# Patient Record
Sex: FEMALE | Race: BLACK OR AFRICAN AMERICAN | Employment: OTHER | URBAN - METROPOLITAN AREA
[De-identification: names, ages, dates, MRNs, and addresses within clinical notes are randomized per-mention and may not be internally consistent; named-entity substitution may affect disease eponyms.]

---

## 2017-01-01 ENCOUNTER — APPOINTMENT (OUTPATIENT)
Dept: GENERAL RADIOLOGY | Age: 77
End: 2017-01-01
Attending: INTERNAL MEDICINE
Payer: MEDICARE

## 2017-01-01 ENCOUNTER — HOSPITAL ENCOUNTER (EMERGENCY)
Age: 77
Discharge: HOME | End: 2017-12-21
Attending: INTERNAL MEDICINE
Payer: MEDICARE

## 2017-01-01 VITALS
TEMPERATURE: 97.6 F | HEIGHT: 65 IN | OXYGEN SATURATION: 100 % | SYSTOLIC BLOOD PRESSURE: 155 MMHG | DIASTOLIC BLOOD PRESSURE: 60 MMHG | BODY MASS INDEX: 29.99 KG/M2 | HEART RATE: 53 BPM | RESPIRATION RATE: 16 BRPM | WEIGHT: 180 LBS

## 2017-01-01 DIAGNOSIS — S80.01XA CONTUSION OF RIGHT KNEE, INITIAL ENCOUNTER: ICD-10-CM

## 2017-01-01 DIAGNOSIS — M15.9 PRIMARY OSTEOARTHRITIS INVOLVING MULTIPLE JOINTS: ICD-10-CM

## 2017-01-01 DIAGNOSIS — M25.561 ACUTE PAIN OF RIGHT KNEE: ICD-10-CM
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
## 2017-01-01 LAB
ALBUMIN SERPL-MCNC: 3.5 G/DL (ref 3.4–5)
ALBUMIN/GLOB SERPL: 0.9 {RATIO} (ref 0.8–1.7)
ALP SERPL-CCNC: 56 U/L (ref 45–117)
ALT SERPL-CCNC: 43 U/L (ref 13–56)
ANION GAP SERPL CALC-SCNC: 11 MMOL/L (ref 3–18)
AST SERPL-CCNC: 33 U/L (ref 15–37)
ATRIAL RATE: 241 BPM
BASOPHILS # BLD: 0 K/UL (ref 0–0.06)
BASOPHILS NFR BLD: 0 % (ref 0–2)
BILIRUB SERPL-MCNC: 0.5 MG/DL (ref 0.2–1)
BUN SERPL-MCNC: 13 MG/DL (ref 7–18)
BUN/CREAT SERPL: 13 (ref 12–20)
CALCIUM SERPL-MCNC: 9.3 MG/DL (ref 8.5–10.1)
CALCULATED R AXIS, ECG10: 18 DEGREES
CALCULATED T AXIS, ECG11: 124 DEGREES
CHLORIDE SERPL-SCNC: 104 MMOL/L (ref 100–108)
CK MB CFR SERPL CALC: 1.5 % (ref 0–4)
CK MB SERPL-MCNC: 1.2 NG/ML (ref 5–25)
CK SERPL-CCNC: 78 U/L (ref 26–192)
CO2 SERPL-SCNC: 27 MMOL/L (ref 21–32)
CREAT SERPL-MCNC: 1.03 MG/DL (ref 0.6–1.3)
DIAGNOSIS, 93000: NORMAL
DIFFERENTIAL METHOD BLD: ABNORMAL
EOSINOPHIL # BLD: 0.1 K/UL (ref 0–0.4)
EOSINOPHIL NFR BLD: 1 % (ref 0–5)
ERYTHROCYTE [DISTWIDTH] IN BLOOD BY AUTOMATED COUNT: 15.3 % (ref 11.6–14.5)
GLOBULIN SER CALC-MCNC: 3.8 G/DL (ref 2–4)
GLUCOSE SERPL-MCNC: 233 MG/DL (ref 74–99)
HCT VFR BLD AUTO: 36.8 % (ref 35–45)
HGB BLD-MCNC: 12.2 G/DL (ref 12–16)
LYMPHOCYTES # BLD: 0.9 K/UL (ref 0.9–3.6)
LYMPHOCYTES NFR BLD: 13 % (ref 21–52)
MAGNESIUM SERPL-MCNC: 1.5 MG/DL (ref 1.6–2.6)
MCH RBC QN AUTO: 30.3 PG (ref 24–34)
MCHC RBC AUTO-ENTMCNC: 33.2 G/DL (ref 31–37)
MCV RBC AUTO: 91.5 FL (ref 74–97)
MONOCYTES # BLD: 0.6 K/UL (ref 0.05–1.2)
MONOCYTES NFR BLD: 9 % (ref 3–10)
NEUTS SEG # BLD: 4.9 K/UL (ref 1.8–8)
NEUTS SEG NFR BLD: 77 % (ref 40–73)
PLATELET # BLD AUTO: 250 K/UL (ref 135–420)
PMV BLD AUTO: 10.5 FL (ref 9.2–11.8)
POTASSIUM SERPL-SCNC: 4.6 MMOL/L (ref 3.5–5.5)
PROT SERPL-MCNC: 7.3 G/DL (ref 6.4–8.2)
Q-T INTERVAL, ECG07: 430 MS
QRS DURATION, ECG06: 80 MS
QTC CALCULATION (BEZET), ECG08: 392 MS
RBC # BLD AUTO: 4.02 M/UL (ref 4.2–5.3)
SODIUM SERPL-SCNC: 142 MMOL/L (ref 136–145)
TROPONIN I SERPL-MCNC: <0.02 NG/ML (ref 0–0.06)
VENTRICULAR RATE, ECG03: 50 BPM
WBC # BLD AUTO: 6.3 K/UL (ref 4.6–13.2)

## 2017-01-01 PROCEDURE — 80053 COMPREHEN METABOLIC PANEL: CPT | Performed by: INTERNAL MEDICINE

## 2017-01-01 PROCEDURE — 93005 ELECTROCARDIOGRAM TRACING: CPT

## 2017-01-01 PROCEDURE — 73552 X-RAY EXAM OF FEMUR 2/>: CPT

## 2017-01-01 PROCEDURE — 82550 ASSAY OF CK (CPK): CPT | Performed by: INTERNAL MEDICINE

## 2017-01-01 PROCEDURE — 85025 COMPLETE CBC W/AUTO DIFF WBC: CPT | Performed by: INTERNAL MEDICINE

## 2017-01-01 PROCEDURE — 74011250636 HC RX REV CODE- 250/636: Performed by: INTERNAL MEDICINE

## 2017-01-01 PROCEDURE — 73560 X-RAY EXAM OF KNEE 1 OR 2: CPT

## 2017-01-01 PROCEDURE — 83735 ASSAY OF MAGNESIUM: CPT | Performed by: INTERNAL MEDICINE

## 2017-01-01 PROCEDURE — 99284 EMERGENCY DEPT VISIT MOD MDM: CPT

## 2017-01-01 PROCEDURE — 96374 THER/PROPH/DIAG INJ IV PUSH: CPT

## 2017-01-01 RX ORDER — KETOROLAC TROMETHAMINE 30 MG/ML
15 INJECTION, SOLUTION INTRAMUSCULAR; INTRAVENOUS
Status: COMPLETED | OUTPATIENT
Start: 2017-01-01 | End: 2017-01-01

## 2017-01-01 RX ORDER — FENTANYL CITRATE 50 UG/ML
50 INJECTION, SOLUTION INTRAMUSCULAR; INTRAVENOUS ONCE
Status: COMPLETED | OUTPATIENT
Start: 2017-01-01 | End: 2017-01-01

## 2017-01-01 RX ORDER — ACETAMINOPHEN AND CODEINE PHOSPHATE 300; 30 MG/1; MG/1
1 TABLET ORAL
Qty: 12 TAB | Refills: 0 | Status: SHIPPED | OUTPATIENT
Start: 2017-01-01 | End: 2018-01-01

## 2017-01-01 RX ADMIN — FENTANYL CITRATE 50 MCG: 50 INJECTION, SOLUTION INTRAMUSCULAR; INTRAVENOUS at 11:29

## 2017-01-01 RX ADMIN — KETOROLAC TROMETHAMINE 15 MG: 30 INJECTION, SOLUTION INTRAMUSCULAR at 14:52

## 2017-01-26 PROBLEM — M16.10 HIP ARTHRITIS: Status: ACTIVE | Noted: 2017-01-26

## 2017-02-28 PROBLEM — R06.02 SHORTNESS OF BREATH: Status: ACTIVE | Noted: 2017-02-28

## 2017-02-28 PROBLEM — L03.116 CELLULITIS OF LEFT LOWER EXTREMITY: Status: ACTIVE | Noted: 2017-02-28

## 2017-02-28 PROBLEM — R60.9 EDEMA: Status: ACTIVE | Noted: 2017-02-28

## 2017-02-28 PROBLEM — J18.9 PNEUMONIA: Status: ACTIVE | Noted: 2017-02-28

## 2017-08-08 ENCOUNTER — HOSPITAL ENCOUNTER (EMERGENCY)
Age: 77
Discharge: HOME | End: 2017-08-08
Attending: FAMILY MEDICINE
Payer: MEDICARE

## 2017-08-08 ENCOUNTER — APPOINTMENT (OUTPATIENT)
Dept: GENERAL RADIOLOGY | Age: 77
End: 2017-08-08
Attending: PHYSICIAN ASSISTANT
Payer: MEDICARE

## 2017-08-08 VITALS
TEMPERATURE: 97.8 F | SYSTOLIC BLOOD PRESSURE: 167 MMHG | HEART RATE: 57 BPM | DIASTOLIC BLOOD PRESSURE: 58 MMHG | OXYGEN SATURATION: 98 % | RESPIRATION RATE: 16 BRPM

## 2017-08-08 DIAGNOSIS — J45.901 ASTHMA WITH ACUTE EXACERBATION, UNSPECIFIED ASTHMA SEVERITY: Primary | ICD-10-CM

## 2017-08-08 LAB
ALBUMIN SERPL BCP-MCNC: 3.4 G/DL (ref 3.4–5)
ALBUMIN/GLOB SERPL: 0.9 {RATIO} (ref 0.8–1.7)
ALP SERPL-CCNC: 120 U/L (ref 45–117)
ALT SERPL-CCNC: 32 U/L (ref 13–56)
ANION GAP BLD CALC-SCNC: 12 MMOL/L (ref 3–18)
APPEARANCE UR: CLEAR
APTT PPP: 27.1 SEC (ref 23–36.4)
AST SERPL W P-5'-P-CCNC: 25 U/L (ref 15–37)
ATRIAL RATE: 60 BPM
BACTERIA URNS QL MICRO: ABNORMAL /HPF
BASOPHILS # BLD AUTO: 0 K/UL (ref 0–0.06)
BASOPHILS # BLD: 0 % (ref 0–2)
BILIRUB SERPL-MCNC: 0.3 MG/DL (ref 0.2–1)
BILIRUB UR QL: NEGATIVE
BNP SERPL-MCNC: 239 PG/ML (ref 0–1800)
BUN SERPL-MCNC: 12 MG/DL (ref 7–18)
BUN/CREAT SERPL: 14 (ref 12–20)
CALCIUM SERPL-MCNC: 9.2 MG/DL (ref 8.5–10.1)
CALCULATED P AXIS, ECG09: 49 DEGREES
CALCULATED R AXIS, ECG10: 14 DEGREES
CALCULATED T AXIS, ECG11: 76 DEGREES
CHLORIDE SERPL-SCNC: 105 MMOL/L (ref 100–108)
CK MB CFR SERPL CALC: NORMAL % (ref 0–4)
CK MB SERPL-MCNC: <1 NG/ML (ref 5–25)
CK SERPL-CCNC: 46 U/L (ref 26–192)
CO2 SERPL-SCNC: 25 MMOL/L (ref 21–32)
COLOR UR: YELLOW
CREAT SERPL-MCNC: 0.86 MG/DL (ref 0.6–1.3)
DIAGNOSIS, 93000: NORMAL
DIFFERENTIAL METHOD BLD: ABNORMAL
EOSINOPHIL # BLD: 0.1 K/UL (ref 0–0.4)
EOSINOPHIL NFR BLD: 1 % (ref 0–5)
EPITH CASTS URNS QL MICRO: ABNORMAL /LPF (ref 0–5)
ERYTHROCYTE [DISTWIDTH] IN BLOOD BY AUTOMATED COUNT: 16.6 % (ref 11.6–14.5)
GLOBULIN SER CALC-MCNC: 4 G/DL (ref 2–4)
GLUCOSE SERPL-MCNC: 134 MG/DL (ref 74–99)
GLUCOSE UR STRIP.AUTO-MCNC: NEGATIVE MG/DL
HCT VFR BLD AUTO: 38.8 % (ref 35–45)
HGB BLD-MCNC: 13 G/DL (ref 12–16)
HGB UR QL STRIP: NEGATIVE
INR PPP: 1 (ref 0.8–1.2)
KETONES UR QL STRIP.AUTO: NEGATIVE MG/DL
LEUKOCYTE ESTERASE UR QL STRIP.AUTO: ABNORMAL
LYMPHOCYTES # BLD AUTO: 26 % (ref 21–52)
LYMPHOCYTES # BLD: 1.4 K/UL (ref 0.9–3.6)
MCH RBC QN AUTO: 29.8 PG (ref 24–34)
MCHC RBC AUTO-ENTMCNC: 33.5 G/DL (ref 31–37)
MCV RBC AUTO: 89 FL (ref 74–97)
MONOCYTES # BLD: 0.3 K/UL (ref 0.05–1.2)
MONOCYTES NFR BLD AUTO: 6 % (ref 3–10)
NEUTS SEG # BLD: 3.7 K/UL (ref 1.8–8)
NEUTS SEG NFR BLD AUTO: 67 % (ref 40–73)
NITRITE UR QL STRIP.AUTO: NEGATIVE
P-R INTERVAL, ECG05: 196 MS
PH UR STRIP: 6 [PH] (ref 5–8)
PLATELET # BLD AUTO: 240 K/UL (ref 135–420)
PMV BLD AUTO: 10.5 FL (ref 9.2–11.8)
POTASSIUM SERPL-SCNC: 4 MMOL/L (ref 3.5–5.5)
PROT SERPL-MCNC: 7.4 G/DL (ref 6.4–8.2)
PROT UR STRIP-MCNC: NEGATIVE MG/DL
PROTHROMBIN TIME: 13 SEC (ref 11.5–15.2)
Q-T INTERVAL, ECG07: 418 MS
QRS DURATION, ECG06: 76 MS
QTC CALCULATION (BEZET), ECG08: 418 MS
RBC # BLD AUTO: 4.36 M/UL (ref 4.2–5.3)
RBC #/AREA URNS HPF: ABNORMAL /HPF (ref 0–5)
SODIUM SERPL-SCNC: 142 MMOL/L (ref 136–145)
SP GR UR REFRACTOMETRY: 1.01 (ref 1–1.03)
TROPONIN I SERPL-MCNC: <0.02 NG/ML (ref 0–0.06)
UROBILINOGEN UR QL STRIP.AUTO: 1 EU/DL (ref 0.2–1)
VENTRICULAR RATE, ECG03: 60 BPM
WBC # BLD AUTO: 5.5 K/UL (ref 4.6–13.2)
WBC URNS QL MICRO: ABNORMAL /HPF (ref 0–5)

## 2017-08-08 PROCEDURE — 99285 EMERGENCY DEPT VISIT HI MDM: CPT

## 2017-08-08 PROCEDURE — 94640 AIRWAY INHALATION TREATMENT: CPT

## 2017-08-08 PROCEDURE — 82550 ASSAY OF CK (CPK): CPT | Performed by: PHYSICIAN ASSISTANT

## 2017-08-08 PROCEDURE — 71010 XR CHEST PORT: CPT

## 2017-08-08 PROCEDURE — 85025 COMPLETE CBC W/AUTO DIFF WBC: CPT | Performed by: PHYSICIAN ASSISTANT

## 2017-08-08 PROCEDURE — 94762 N-INVAS EAR/PLS OXIMTRY CONT: CPT

## 2017-08-08 PROCEDURE — 85730 THROMBOPLASTIN TIME PARTIAL: CPT | Performed by: PHYSICIAN ASSISTANT

## 2017-08-08 PROCEDURE — 96374 THER/PROPH/DIAG INJ IV PUSH: CPT

## 2017-08-08 PROCEDURE — 96375 TX/PRO/DX INJ NEW DRUG ADDON: CPT

## 2017-08-08 PROCEDURE — 74011250636 HC RX REV CODE- 250/636: Performed by: PHYSICIAN ASSISTANT

## 2017-08-08 PROCEDURE — 81001 URINALYSIS AUTO W/SCOPE: CPT | Performed by: PHYSICIAN ASSISTANT

## 2017-08-08 PROCEDURE — 74011000250 HC RX REV CODE- 250: Performed by: PHYSICIAN ASSISTANT

## 2017-08-08 PROCEDURE — 85610 PROTHROMBIN TIME: CPT | Performed by: PHYSICIAN ASSISTANT

## 2017-08-08 PROCEDURE — 77030013140 HC MSK NEB VYRM -A

## 2017-08-08 PROCEDURE — 80053 COMPREHEN METABOLIC PANEL: CPT | Performed by: PHYSICIAN ASSISTANT

## 2017-08-08 PROCEDURE — 83880 ASSAY OF NATRIURETIC PEPTIDE: CPT | Performed by: PHYSICIAN ASSISTANT

## 2017-08-08 PROCEDURE — 93005 ELECTROCARDIOGRAM TRACING: CPT

## 2017-08-08 PROCEDURE — 36415 COLL VENOUS BLD VENIPUNCTURE: CPT | Performed by: PHYSICIAN ASSISTANT

## 2017-08-08 RX ORDER — LOSARTAN POTASSIUM 50 MG/1
50 TABLET ORAL DAILY
Status: ON HOLD | COMMUNITY
End: 2018-01-01

## 2017-08-08 RX ORDER — FUROSEMIDE 10 MG/ML
80 INJECTION INTRAMUSCULAR; INTRAVENOUS
Status: DISCONTINUED | OUTPATIENT
Start: 2017-08-08 | End: 2017-08-08 | Stop reason: CLARIF

## 2017-08-08 RX ORDER — DOXYCYCLINE HYCLATE 100 MG
100 TABLET ORAL 2 TIMES DAILY
Qty: 14 TAB | Refills: 0 | Status: SHIPPED | OUTPATIENT
Start: 2017-08-08 | End: 2017-08-15

## 2017-08-08 RX ORDER — PREDNISONE 20 MG/1
40 TABLET ORAL DAILY
Qty: 8 TAB | Refills: 0 | Status: SHIPPED | OUTPATIENT
Start: 2017-08-08 | End: 2017-08-12

## 2017-08-08 RX ORDER — FAMOTIDINE 10 MG/ML
20 INJECTION INTRAVENOUS
Status: COMPLETED | OUTPATIENT
Start: 2017-08-08 | End: 2017-08-08

## 2017-08-08 RX ORDER — ALBUTEROL SULFATE 0.83 MG/ML
2.5 SOLUTION RESPIRATORY (INHALATION)
Qty: 24 EACH | Refills: 0 | Status: ON HOLD | OUTPATIENT
Start: 2017-08-08 | End: 2018-01-01

## 2017-08-08 RX ORDER — AMLODIPINE BESYLATE 10 MG/1
10 TABLET ORAL DAILY
COMMUNITY
End: 2018-01-01

## 2017-08-08 RX ORDER — AMIODARONE HYDROCHLORIDE 200 MG/1
200 TABLET ORAL
Status: ON HOLD | COMMUNITY
End: 2018-01-01

## 2017-08-08 RX ORDER — IPRATROPIUM BROMIDE AND ALBUTEROL SULFATE 2.5; .5 MG/3ML; MG/3ML
3 SOLUTION RESPIRATORY (INHALATION)
Status: COMPLETED | OUTPATIENT
Start: 2017-08-08 | End: 2017-08-08

## 2017-08-08 RX ADMIN — IPRATROPIUM BROMIDE AND ALBUTEROL SULFATE 3 ML: .5; 3 SOLUTION RESPIRATORY (INHALATION) at 17:23

## 2017-08-08 RX ADMIN — FAMOTIDINE 20 MG: 10 INJECTION, SOLUTION INTRAVENOUS at 17:29

## 2017-08-08 RX ADMIN — METHYLPREDNISOLONE SODIUM SUCCINATE 125 MG: 125 INJECTION, POWDER, FOR SOLUTION INTRAMUSCULAR; INTRAVENOUS at 17:26

## 2017-08-08 NOTE — DISCHARGE INSTRUCTIONS

## 2017-08-08 NOTE — ED PROVIDER NOTES
HPI Comments:   4:45 PM  Vitor Ortiz is a 68 y.o. female with hx asthma, HTN, and paroxysmal a-fib, who presents to ED from [facility] via EMS c/o intermittent SOB onset earlier today. SOB worse with talking and exertion. Associated symptoms include substernal \"indigestion pain\" (when asked about chest pain denied it saying it's \"indigestion\"), dry cough, and chronic unchanged leg swelling. Son reports pt had right hip surgery 1 year ago. No hx of DVT/PE. No hx of MI. Pt is on Eliquis. Pt denies fever, orthopnea, and any other Sx or complaints. Pt was very agitated during questioning often yelling at this provider and staff. Patient is a 68 y.o. female presenting with shortness of breath. The history is provided by the patient and a relative. No  was used. Shortness of Breath   This is a new problem. Episode onset: earlier today. The problem has not changed since onset. Associated symptoms include cough (dry), wheezing and leg swelling (chronic and unchanged). Pertinent negatives include no fever, no sore throat, no chest pain, no vomiting and no rash. Associated medical issues include asthma.         Past Medical History:   Diagnosis Date    Anemia NEC     Arthritis     Arthropathy, unspecified, site unspecified     Asthma     Atrial fibrillation (HCC)     CHRONIC A-FIB    Diabetes mellitus     Essential hypertension     Heart abnormalities     irregular heartbeat    Hypertension     Liver disease     steatohepatitis    Neurological disorder     dementia    Osteoarthritis     LEFT HIP       Past Surgical History:   Procedure Laterality Date    Charissa Hasten HIP REPLACEMENT  11/2014    HX OTHER SURGICAL  5/27/12    colon mass removed    HX TUBAL LIGATION           Family History:   Problem Relation Age of Onset    Hypertension Mother     Diabetes Father     Diabetes Daughter        Social History     Social History    Marital status:      Spouse name: N/A    Number of children: N/A    Years of education: N/A     Occupational History    Not on file. Social History Main Topics    Smoking status: Never Smoker    Smokeless tobacco: Never Used    Alcohol use No    Drug use: No    Sexual activity: No     Other Topics Concern    Not on file     Social History Narrative         ALLERGIES: Compazine [prochlorperazine edisylate]    Review of Systems   Constitutional: Negative for chills and fever. HENT: Negative for congestion and sore throat. Respiratory: Positive for cough (dry), shortness of breath and wheezing. Cardiovascular: Positive for leg swelling (chronic and unchanged). Negative for chest pain. +\"indegestion pain\"   Gastrointestinal: Negative for nausea and vomiting. Genitourinary: Negative for difficulty urinating, dysuria, frequency and urgency. Musculoskeletal: Negative for back pain. Skin: Negative for rash. Neurological: Negative for dizziness, weakness, light-headedness and numbness. Hematological: Bruises/bleeds easily (on Eliquis). Vitals:    08/08/17 1631 08/08/17 1748 08/08/17 1838 08/08/17 1933   BP: 153/54 149/80 161/70 167/58   Pulse: 62 61 (!) 57 (!) 57   Resp: 16 19 25 16   Temp: 97.8 °F (36.6 °C)      SpO2: 98% 100% 100% 98%            Physical Exam   Constitutional: She is oriented to person, place, and time. She appears well-developed and well-nourished. No distress. AA geriatric female in NAD. Agitated. Olam Panning at staff at times but easily redirected. HENT:   Head: Normocephalic and atraumatic. Right Ear: External ear normal.   Left Ear: External ear normal.   Nose: Nose normal.   Mouth/Throat: Uvula is midline, oropharynx is clear and moist and mucous membranes are normal.   Eyes: Conjunctivae are normal. Right eye exhibits no discharge. Left eye exhibits no discharge. Neck: Normal range of motion. Cardiovascular: Normal rate, regular rhythm, normal heart sounds and intact distal pulses. Exam reveals no gallop and no friction rub. No murmur heard. Pulmonary/Chest: Effort normal. No accessory muscle usage. No tachypnea. No respiratory distress. She has no decreased breath sounds. She has wheezes in the right lower field and the left lower field. She has no rhonchi. She has no rales. Abdominal: Soft. Musculoskeletal: Normal range of motion. She exhibits edema (minimal). She exhibits no tenderness. Neurological: She is alert and oriented to person, place, and time. Skin: Skin is warm and dry. No rash noted. She is not diaphoretic. No erythema. Psychiatric: She has a normal mood and affect. Judgment normal.   Nursing note and vitals reviewed. RESULTS:    CARDIAC MONITOR NOTE:  Cardiac Rhythm: NSR  Rate: 61 bpm     PULSE OXIMETRY NOTE:  Pulse-ox is 98% on room air  Interpretation: normal    EKG interpretation: (Preliminary)  NSR. Rate 60 bpm. Normal QTC. EKG read by Alida Kocher, MD at 16:29      [address] [place] [address]  Chest X-ray shows no acute process   Pending review by Radiologist  Recorded by ADRIA Vital, as dictated by John Brock PA-C    XR CHEST PORT   Final Result           Labs Reviewed   CBC WITH AUTOMATED DIFF - Abnormal; Notable for the following:        Result Value    RDW 16.6 (*)     All other components within normal limits   METABOLIC PANEL, COMPREHENSIVE - Abnormal; Notable for the following:     Glucose 134 (*)     Alk. phosphatase 120 (*)     All other components within normal limits   URINALYSIS W/ RFLX MICROSCOPIC - Abnormal; Notable for the following:     Leukocyte Esterase TRACE (*)     All other components within normal limits   URINE MICROSCOPIC ONLY - Abnormal; Notable for the following:     Bacteria FEW (*)     All other components within normal limits   CARDIAC PANEL,(CK, CKMB & TROPONIN)   NT-PRO BNP   PROTHROMBIN TIME + INR   PTT       No results found for this or any previous visit (from the past 12 hour(s)).     MDM  Number of Diagnoses or Management Options  Asthma with acute exacerbation, unspecified asthma severity:   Diagnosis management comments: ACS/MI, arrhythmia, PNA, asthma, COPD, CHF, PTX, pericarditis, myocarditis. Doubt PE as on Eliquis. Doubt AAA or dissection       Amount and/or Complexity of Data Reviewed  Clinical lab tests: reviewed and ordered  Tests in the radiology section of CPT®: reviewed and ordered (CXR)  Tests in the medicine section of CPT®: ordered and reviewed (EKG)  Obtain history from someone other than the patient: yes (son)  Independent visualization of images, tracings, or specimens: yes (EKG, CXR)      ED Course     MEDICATIONS GIVEN:  Medications   albuterol-ipratropium (DUO-NEB) 2.5 MG-0.5 MG/3 ML (3 mL Nebulization Given 8/8/17 1723)   methylPREDNISolone (PF) (SOLU-MEDROL) injection 125 mg (125 mg IntraVENous Given 8/8/17 1726)   famotidine (PF) (PEPCID) injection 20 mg (20 mg IntraVENous Given 8/8/17 1729)        Procedures    PROGRESS NOTE:  4:45 PM  Initial assessment performed. Written by Mariella Chan ED Scribe, as dictated by Higinio Reyes PA-C    PROGRESS NOTE:   7:42 PM  Pt has been re-examined by Higinio Reyes PA-C. Pt continues to have no complaints. Will discharge home with rx for Prednisone, Albuterol, and Doxy. Suspect bronchitis with asthma flare. Observed for several hours in ED with improvement. Reasons to RTED discussed with pt and her son. All questions answered. Pt and her son feels comfortable going home at this time. Pt and her son expressed understanding and they agree with plan. Written by Mariella Chan ED Scribe, as dictated by Higinio Reyes PA-C.     DISCHARGE NOTE:  7:47 PM  Venecia Bene  results have been reviewed with her and her son  She has been counseled regarding her diagnosis, treatment, and plan. They verbally conveys understanding and agreement of the signs, symptoms, diagnosis, treatment and prognosis and additionally agrees to follow up as discussed. She also agrees with the care-plan and conveys that all of her questions have been answered. I have also provided discharge instructions for her that include: educational information regarding their diagnosis and treatment, and list of reasons why they would want to return to the ED prior to their follow-up appointment, should her condition change. Proper ED utilization discussed with the pt. CLINICAL IMPRESSION:    1. Asthma with acute exacerbation, unspecified asthma severity        PLAN: DISCHARGE HOME    Follow-up Information     Follow up With Details Comments Contact Info    Colton Hernandez MD Call in 2 days For follow up with  [address]      [facility] EMERGENCY DEPT Go to As needed, If symptoms worsen [address] [address]  [phone]          Discharge Medication List as of 8/8/2017  7:48 PM      START taking these medications    Details   predniSONE (DELTASONE) 20 mg tablet Take 2 Tabs by mouth daily for 4 days. With Breakfast, Print, Disp-8 Tab, R-0      albuterol (PROVENTIL VENTOLIN) 2.5 mg /3 mL (0.083 %) nebulizer solution 3 mL by Nebulization route every four (4) hours as needed for Wheezing., Print, Disp-24 Each, R-0      doxycycline (VIBRA-TABS) 100 mg tablet Take 1 Tab by mouth two (2) times a day for 7 days. , Print, Disp-14 Tab, R-0         CONTINUE these medications which have NOT CHANGED    Details   amiodarone (CORDARONE) 200 mg tablet Take 200 mg by mouth., Historical Med      amLODIPine (NORVASC) 10 mg tablet Take 10 mg by mouth daily. , Historical Med      losartan (COZAAR) 50 mg tablet Take 50 mg by mouth daily. , Historical Med      oxyCODONE-acetaminophen (PERCOCET) 5-325 mg per tablet Take 1 Tab by mouth every four (4) hours as needed for Pain.  Max Daily Amount: 6 Tabs., Print, Disp-25 Tab, R-0      furosemide (LASIX) 40 mg tablet 40 mg Bid po, Print, Disp-30 Tab, R-0      glucagon (GLUCAGEN) 1 mg injection 1 mL by IntraMUSCular route as needed for Hypoglycemia., Print, Disp-1 Vial, R-0      insulin glargine (LANTUS) 100 unit/mL injection 20 unit SC QHS, No Print, Disp-1 Vial, R-0      insulin lispro (HUMALOG) 100 unit/mL injection Less than 150 =   0 units           150 -199 =  2 units  200 -249 =  4 units  250 -299 =  6 units  300 -349 =  8 units  350 - 399 = 10 units, No Print, Disp-1 Vial, R-0      Lactobacillus Acidoph & Bulgar (FLORANEX) 1 million cell tab tablet Take 2 Tabs by mouth two (2) times a day., Print, Disp-20 Tab, R-0      potassium chloride (K-DUR, KLOR-CON) 20 mEq tablet Take 1 Tab by mouth two (2) times a day., Print, Disp-20 Tab, R-0      apixaban (ELIQUIS) 5 mg tablet Take 1 Tab by mouth two (2) times a day., Print, Disp-60 Tab, R-0      metoprolol tartrate (LOPRESSOR) 25 mg tablet Take 1 Tab by mouth two (2) times a day., Print, Disp-60 Tab, R-0      ferrous sulfate 325 mg (65 mg iron) tablet Take 1 Tab by mouth two (2) times daily (with meals). , Print, Disp-60 Tab, R-0      QUEtiapine (SEROQUEL) 25 mg tablet Take 12.5 mg by mouth two (2) times a day., Historical Med      FOLIC ACID/MULTIVIT-MIN/LUTEIN (CENTRUM SILVER PO) Take 1 Tab by mouth daily. , Historical Med      nystatin (MYCOSTATIN) 100,000 unit/gram ointment Apply  to affected area two (2) times a day. Indications: CUTANEOUS CANDIDIASIS, DIAPER RASH, Historical Med      polyethylene glycol (MIRALAX) 17 gram/dose powder Take 17 g by mouth daily as needed. Indications: Constipation, Historical Med      cholecalciferol (VITAMIN D3) 1,000 unit cap Take 1,000 Units by mouth daily. Daughter is unsure about the dosage. , Historical Med      Cetirizine (ZYRTEC) 10 mg cap Take  by mouth. 24hr tab., Historical Med      prednisoLONE acetate (PRED FORTE) 1 % ophthalmic suspension Administer 1 Drop to left eye two (2) times a day. Please verify with  Patient's daughter the percentage.  Thanks,, Historical Med      FLUoxetine (PROZAC) 20 mg tablet Take 40 mg by mouth daily. , Historical Med      aspirin delayed-release 81 mg tablet Take 81 mg by mouth daily. , Historical Med      mometasone (ASMANEX TWISTHALER) 220 mcg (120 doses) AePB inhaler Take 1 Puff by inhalation. Powder that patient inhales. 220mcg. Indications: MAINTENANCE THERAPY FOR ASTHMA, Historical Med      montelukast (SINGULAIR) 10 mg tablet Take 10 mg by mouth daily as needed., Historical Med      simvastatin (ZOCOR) 10 mg tablet Take 10 mg by mouth nightly., Historical Med      metFORMIN (GLUCOPHAGE) 1,000 mg tablet Take 1,000 mg by mouth two (2) times daily (with meals). Historical Med, 1,000 mg      olopatadine (PATANOL) 0.1 % ophthalmic solution Administer 2 Drops to both eyes two (2) times a day. Historical Med, 2 Drop      glipiZIDE SR (GLUCOTROL) 2.5 mg CR tablet Take 10 mg by mouth daily. , Historical Med         STOP taking these medications       albuterol (PROVENTIL HFA) 90 mcg/actuation inhaler Comments:   Reason for Stopping:               ATTESTATIONS:  This note is prepared by Marisa Richard, acting as Scribe for John Brock PA-C. John Brock PA-C: The scribe's documentation has been prepared under my direction and personally reviewed by me in its entirety. I confirm that the note above accurately reflects all work, treatment, procedures, and medical decision making performed by me.

## 2017-08-08 NOTE — ED TRIAGE NOTES
Pt sent for evaluation for shortness of breath from [facility]; Pt in No distress;   Pt states has also had a cough  Denies pain or chest pain

## 2017-08-08 NOTE — ED NOTES
Pt voided in bed pain. Pt repositioned in bed, offers no further needs at this time.  [facility] spoke w/ lab, alerting them we are still awaiting lab draw

## 2017-08-09 NOTE — ED NOTES
Patient armband removed and shredded  I have reviewed discharge instructions with the caregiver. The caregiver verbalized understanding.

## 2017-12-21 NOTE — ED NOTES
Attempted to ambulate patient. Pt refusing to participate with movement. [facility] called and notified of test results and patients current status. Spoke with  of [facility] and she states they will accept the patient back. Report called to [address] at [place].

## 2017-12-21 NOTE — ED PROVIDER NOTES
EMERGENCY DEPARTMENT HISTORY AND PHYSICAL EXAM    Date: 12/21/2017  Patient Name: Gayla Wallace    History of Presenting Illness     Chief Complaint   Patient presents with    Fall         History Provided By: Patient and EMS    Chief Complaint: right leg/knee pain  Duration: PTA   Timing:  Acute  Location: right leg/knee  Modifying Factors: touch and moving the knee worsens it  Severity: 10 out of 10. Associated Symptoms: leg swelling    Additional History (Context):   11:23 AM  Audrey Bennett is a 68 y.o. female with PMHX of dementia, arthropathy, and OA (left hip) who presents to the emergency department via EMS from [facility] [address] acute right leg pain/knee pain worsened by touch and movement of limb onset PTA s/p unwitnessed ground level fall onset PTA. Pt states she thinks she tripped. Associated sx includes leg swelling. PSHx includes bunionectomy, tubal ligation, colon mass removal, and left hip replacement years ago. Allergies reported to Compazine. Mental status is at baseline, per EMS. Pt denies current CP and SOB. Hx limited due to dementia. PCP: Daniel Mullen MD    Current Facility-Administered Medications   Medication Dose Route Frequency Provider Last Rate Last Dose    sodium chloride 0.9 % bolus infusion 1,000 mL  1,000 mL IntraVENous ONCE Olga Oh MD        ketorolac (TORADOL) injection 15 mg  15 mg IntraVENous NOW Olga Oh MD         Current Outpatient Prescriptions   Medication Sig Dispense Refill    acetaminophen-codeine (TYLENOL-CODEINE #3) 300-30 mg per tablet Take 1 Tab by mouth every six (6) hours as needed (for moderate to severe pain only. caution can cause drowsiness). Max Daily Amount: 4 Tabs. 12 Tab 0    amiodarone (CORDARONE) 200 mg tablet Take 200 mg by mouth.  amLODIPine (NORVASC) 10 mg tablet Take 10 mg by mouth daily.  losartan (COZAAR) 50 mg tablet Take 50 mg by mouth daily.       albuterol (PROVENTIL VENTOLIN) 2.5 mg /3 mL (0.083 %) nebulizer solution 3 mL by Nebulization route every four (4) hours as needed for Wheezing. 24 Each 0    glucagon (GLUCAGEN) 1 mg injection 1 mL by IntraMUSCular route as needed for Hypoglycemia. 1 Vial 0    apixaban (ELIQUIS) 5 mg tablet Take 1 Tab by mouth two (2) times a day. 60 Tab 0    QUEtiapine (SEROQUEL) 25 mg tablet Take 12.5 mg by mouth two (2) times a day.  polyethylene glycol (MIRALAX) 17 gram/dose powder Take 17 g by mouth daily as needed. Indications: Constipation      cholecalciferol (VITAMIN D3) 1,000 unit cap Take 1,000 Units by mouth daily. Daughter is unsure about the dosage.  prednisoLONE acetate (PRED FORTE) 1 % ophthalmic suspension Administer 1 Drop to left eye two (2) times a day. Please verify with  Patient's daughter the percentage. Thanks,      FLUoxetine (PROZAC) 20 mg tablet Take 40 mg by mouth daily.  aspirin delayed-release 81 mg tablet Take 81 mg by mouth daily.  mometasone (ASMANEX TWISTHALER) 220 mcg (120 doses) AePB inhaler Take 1 Puff by inhalation. Powder that patient inhales. 220mcg. Indications: MAINTENANCE THERAPY FOR ASTHMA      montelukast (SINGULAIR) 10 mg tablet Take 10 mg by mouth daily as needed.  simvastatin (ZOCOR) 10 mg tablet Take 10 mg by mouth nightly.  metFORMIN (GLUCOPHAGE) 1,000 mg tablet Take 1,000 mg by mouth two (2) times daily (with meals).  glipiZIDE SR (GLUCOTROL) 2.5 mg CR tablet Take 10 mg by mouth daily.          Past History     Past Medical History:  Past Medical History:   Diagnosis Date    Anemia NEC     Arthritis     Arthropathy, unspecified, site unspecified     Asthma     Atrial fibrillation ([facility] [address]     CHRONIC A-FIB    Diabetes mellitus     Essential hypertension     Heart abnormalities     irregular heartbeat    Hypertension     Liver disease     steatohepatitis    Neurological disorder     dementia    Osteoarthritis     LEFT HIP       Past Surgical History:  Past Surgical History:   Procedure Laterality Date    Avinash Eastman HIP REPLACEMENT  11/2014    HX OTHER SURGICAL  5/27/12    colon mass removed    HX TUBAL LIGATION         Family History:  Family History   Problem Relation Age of Onset    Hypertension Mother     Diabetes Father     Diabetes Daughter        Social History:  Social History   Substance Use Topics    Smoking status: Never Smoker    Smokeless tobacco: Never Used    Alcohol use No       Allergies: Allergies   Allergen Reactions    Compazine [Prochlorperazine Edisylate] Other (comments)     Twitching, seizure-like symptoms         Review of Systems   Review of Systems   Unable to perform ROS: Dementia   Respiratory: Negative for shortness of breath. Cardiovascular: Positive for leg swelling. Negative for chest pain. Musculoskeletal: Positive for arthralgias (right knee). (+) Leg pain       Physical Exam     Vitals:    12/21/17 1131   BP: 155/60   Pulse: (!) 53   Resp: 16   Temp: 97.6 °F (36.4 °C)   SpO2: 100%   Weight: 81.6 kg (180 lb)   Height: 5' 5\" (1.651 m)     Physical Exam   Constitutional: She appears well-developed. Obese. HENT:   Head: Normocephalic and atraumatic. Right Ear: External ear normal.   Left Ear: External ear normal.   Nose: Nose normal.   Mouth/Throat: Oropharynx is clear and moist.   Eyes: Conjunctivae and EOM are normal. Pupils are equal, round, and reactive to light. Right eye exhibits no discharge. Left eye exhibits no discharge. No scleral icterus. Neck: Normal range of motion. Neck supple. No JVD present. No tracheal deviation present. Cardiovascular: Normal rate, regular rhythm, normal heart sounds and intact distal pulses. Pulmonary/Chest: Effort normal and breath sounds normal.   Abdominal: Soft. Bowel sounds are normal. She exhibits no distension. There is no tenderness. No HSM   Musculoskeletal: Normal range of motion. She exhibits edema and tenderness. 2+ pitting edema found. TTP and swelling right knee. Abrasions on bilateral shins. Left superficial knee anterior abrasion. Obviously very tender, more on the right knee proximal tibia. No obvious deformity. Pulses 1+. Neurological: She is alert. She has normal reflexes. No cranial nerve deficit. She exhibits normal muscle tone. No focal motor weakness. Oriented to person and place but not the date. Moving all 3 (except right leg due to pain) extremities. CN intact. Later found pt moving all 4 extremities. Skin: Skin is warm and dry. No rash noted. Psychiatric:   Baseline, per EMS. Nursing note and vitals reviewed. Diagnostic Study Results     Labs -     Recent Results (from the past 12 hour(s))   CBC WITH AUTOMATED DIFF    Collection Time: 12/21/17 11:45 AM   Result Value Ref Range    WBC 6.3 4.6 - 13.2 K/uL    RBC 4.02 (L) 4.20 - 5.30 M/uL    HGB 12.2 12.0 - 16.0 g/dL    HCT 36.8 35.0 - 45.0 %    MCV 91.5 74.0 - 97.0 FL    MCH 30.3 24.0 - 34.0 PG    MCHC 33.2 31.0 - 37.0 g/dL    RDW 15.3 (H) 11.6 - 14.5 %    PLATELET 979 873 - 151 K/uL    MPV 10.5 9.2 - 11.8 FL    NEUTROPHILS 77 (H) 40 - 73 %    LYMPHOCYTES 13 (L) 21 - 52 %    MONOCYTES 9 3 - 10 %    EOSINOPHILS 1 0 - 5 %    BASOPHILS 0 0 - 2 %    ABS. NEUTROPHILS 4.9 1.8 - 8.0 K/UL    ABS. LYMPHOCYTES 0.9 0.9 - 3.6 K/UL    ABS. MONOCYTES 0.6 0.05 - 1.2 K/UL    ABS. EOSINOPHILS 0.1 0.0 - 0.4 K/UL    ABS.  BASOPHILS 0.0 0.0 - 0.06 K/UL    DF AUTOMATED     METABOLIC PANEL, COMPREHENSIVE    Collection Time: 12/21/17 11:45 AM   Result Value Ref Range    Sodium 142 136 - 145 mmol/L    Potassium 4.6 3.5 - 5.5 mmol/L    Chloride 104 100 - 108 mmol/L    CO2 27 21 - 32 mmol/L    Anion gap 11 3.0 - 18 mmol/L    Glucose 233 (H) 74 - 99 mg/dL    BUN 13 7.0 - 18 MG/DL    Creatinine 1.03 0.6 - 1.3 MG/DL    BUN/Creatinine ratio 13 12 - 20      GFR est AA >60 >60 ml/min/1.73m2    GFR est non-AA 52 (L) >60 ml/min/1.73m2    Calcium 9.3 8.5 - 10.1 MG/DL    Bilirubin, total 0.5 0.2 - 1.0 MG/DL    ALT (SGPT) 43 13 - 56 U/L    AST (SGOT) 33 15 - 37 U/L    Alk. phosphatase 56 45 - 117 U/L    Protein, total 7.3 6.4 - 8.2 g/dL    Albumin 3.5 3.4 - 5.0 g/dL    Globulin 3.8 2.0 - 4.0 g/dL    A-G Ratio 0.9 0.8 - 1.7     CARDIAC PANEL,(CK, CKMB & TROPONIN)    Collection Time: 12/21/17 11:45 AM   Result Value Ref Range    CK 78 26 - 192 U/L    CK - MB 1.2 <3.6 ng/ml    CK-MB Index 1.5 0.0 - 4.0 %    Troponin-I, Qt. <0.02 0.00 - 0.06 NG/ML   MAGNESIUM    Collection Time: 12/21/17 11:45 AM   Result Value Ref Range    Magnesium 1.5 (L) 1.6 - 2.6 mg/dL   EKG, 12 LEAD, INITIAL    Collection Time: 12/21/17 12:05 PM   Result Value Ref Range    Ventricular Rate 50 BPM    Atrial Rate 241 BPM    QRS Duration 80 ms    Q-T Interval 430 ms    QTC Calculation (Bezet) 392 ms    Calculated R Axis 18 degrees    Calculated T Axis 124 degrees    Diagnosis       Junctional rhythm  Nonspecific T wave abnormality  Abnormal ECG  When compared with ECG of 08-AUG-2017 16:29,  Junctional rhythm has replaced Sinus rhythm  Non-specific change in ST segment in Inferior leads  Nonspecific T wave abnormality now evident in Inferior leads         Radiologic Studies -   XR KNEE RT MAX 2 VWS   Final Result   IMPRESSION:     1. No acute fracture or dislocation. Tricompartmental DJD with endstage DJD. XR FEMUR RT 2 VS   Final Result   IMPRESSION:     1. No acute fracture or dislocation. 2. Tricompartmental severe DJD. 3. Mild degenerative changes of the hip. As read by the radiologist.     CT Results  (Last 48 hours)    None        CXR Results  (Last 48 hours)    None          Medications given in the ED-  Medications   sodium chloride 0.9 % bolus infusion 1,000 mL (not administered)   ketorolac (TORADOL) injection 15 mg (not administered)   fentaNYL citrate (PF) injection 50 mcg (50 mcg IntraNASal Given 12/21/17 1129)         Medical Decision Making   I am the first provider for this patient.     I reviewed the vital signs, available nursing notes, past medical history, past surgical history, family history and social history. Vital Signs-Reviewed the patient's vital signs. Pulse Oximetry Analysis - 100% on room air. Cardiac Monitor:  Rate: 55 bpm  Rhythm: sinus bradycardia    EKG interpretation: (Preliminary)  12:05 PM   Sinus bradycardia vs. Junctional rhythm at 50 bpm. Negative STEMI. Poor baseline. EKG read by Diana Scales MD at 12:05 PM     Records Reviewed: Nursing Notes    Provider Notes (Medical Decision Making): Injury: Strain, sprain, fracture, dislocation, doubt rhabdomyolysis. Fall: Dehydration, UTI, acute infection. Does not appear to have head or neck injury or evidence of acute intracranial bleeding. Procedures:  Procedures    ED Course:   11:23 AM Initial assessment performed. The patients presenting problems have been discussed, and they are in agreement with the care plan formulated and outlined with them. I have encouraged them to ask questions as they arise throughout their visit. 2:25 PM Upon ambulation, pt would not attempt to bear weight on her right leg.    2:30 PM Pt still having severe pain and has bad arthritis. Noted to be on blood thinner for a long time. Not actively bleeding. Will give a low dose of Toradol. 2:45 PM pt not attempting to ambulate:  nursing Home states that this was normal behavior for pt and that pt should get a d/c home as they do not think that pt fell. Diagnosis and Disposition       DISCHARGE NOTE:  2:51 PM  Kassi Pulse  results have been reviewed with her. She has been counseled regarding her diagnosis, treatment, and plan. She verbally conveys understanding and agreement of the signs, symptoms, diagnosis, treatment and prognosis and additionally agrees to follow up as discussed. She also agrees with the care-plan and conveys that all of her questions have been answered.   I have also provided discharge instructions for her that include: educational information regarding their diagnosis and treatment, and list of reasons why they would want to return to the ED prior to their follow-up appointment, should her condition change. She has been provided with education for proper emergency department utilization. CLINICAL IMPRESSION:    1. Fall, initial encounter    2. Acute pain of right knee    3. Contusion of right knee, initial encounter    4. Primary osteoarthritis involving multiple joints        PLAN:  1. D/C Home  2. Current Discharge Medication List      START taking these medications    Details   acetaminophen-codeine (TYLENOL-CODEINE #3) 300-30 mg per tablet Take 1 Tab by mouth every six (6) hours as needed (for moderate to severe pain only. caution can cause drowsiness). Max Daily Amount: 4 Tabs. Qty: 12 Tab, Refills: 0    Associated Diagnoses: Contusion of right knee, initial encounter           3. Follow-up Information     Follow up With Details Comments Contact Info    [place] [facility]  [facility] Schedule an appointment as soon as possible for a visit in 1 day For Rehab Follow Up and MD evaluation within 24 hours [id number] [facility]    [facility] EMERGENCY DEPT Go to As needed, If symptoms worsen [address] [id number]  [phone]        _______________________________    Attestations: This note is prepared by Giovanni Wolfe, acting as Scribe for Cyndy Miller MD.    Cyndy Miller MD:  The scribe's documentation has been prepared under my direction and personally reviewed by me in its entirety.   I confirm that the note above accurately reflects all work, treatment, procedures, and medical decision making performed by me.  _______________________________

## 2017-12-21 NOTE — DISCHARGE INSTRUCTIONS
Contusion: Care Instructions  Your Care Instructions    Contusion is the medical term for a bruise. It is the result of a direct blow or an impact, such as a fall. Contusions are common sports injuries. Most people think of a bruise as a black-and-blue spot. This happens when small blood vessels get torn and leak blood under the skin. But bones, muscles, and organs can also get bruised. This may damage deep tissues but not cause a bruise you can see. The doctor will do a physical exam to find the location of your contusion. You may also have tests to make sure you do not have a more serious injury, such as a broken bone or nerve damage. These may include X-rays or other imaging tests like a CT scan or MRI. Deep-tissue contusions may cause pain and swelling. But if there is no serious damage, they will often get better in a few weeks with home treatment. The doctor has checked you carefully, but problems can develop later. If you notice any problems or new symptoms, get medical treatment right away. Follow-up care is a key part of your treatment and safety. Be sure to make and go to all appointments, and call your doctor if you are having problems. It's also a good idea to know your test results and keep a list of the medicines you take. How can you care for yourself at home? · Put ice or a cold pack on the sore area for 10 to 20 minutes at a time to stop swelling. Put a thin cloth between the ice pack and your skin. · Be safe with medicines. Read and follow all instructions on the label. ¨ If the doctor gave you a prescription medicine for pain, take it as prescribed. ¨ If you are not taking a prescription pain medicine, ask your doctor if you can take an over-the-counter medicine. · If you can, prop up the sore area on pillows as much as possible for the next few days. Try to keep the sore area above the level of your heart. When should you call for help?   Call your doctor now or seek immediate medical care if:  ? · Your pain gets worse. ? · You have new or worse swelling. ? · You have tingling, weakness, or numbness in the area near the contusion. ? · The area near the contusion is cold or pale. ? Watch closely for changes in your health, and be sure to contact your doctor if:  ? · You do not get better as expected. Where can you learn more? Go to http://minda-valentino.info/. Enter W102 in the search box to learn more about \"Contusion: Care Instructions. \"  Current as of: March 20, 2017  Content Version: 11.4  © 6289-7488 [employer]. Care instructions adapted under license by [employer] (which disclaims liability or warranty for this information). If you have questions about a medical condition or this instruction, always ask your healthcare professional. [place] [address] any warranty or liability for your use of this information. Knee Pain or Injury: Care Instructions  Your Care Instructions    Injuries are a common cause of knee problems. Sudden (acute) injuries may be caused by a direct blow to the knee. They can also be caused by abnormal twisting, bending, or falling on the knee. Pain, bruising, or swelling may be severe, and may start within minutes of the injury. Overuse is another cause of knee pain. Other causes are climbing stairs, kneeling, and other activities that use the knee. Everyday wear and tear, especially as you get older, also can cause knee pain. Rest, along with home treatment, often relieves pain and allows your knee to heal. If you have a serious knee injury, you may need tests and treatment. Follow-up care is a key part of your treatment and safety. Be sure to make and go to all appointments, and call your doctor if you are having problems. It's also a good idea to know your test results and keep a list of the medicines you take. How can you care for yourself at home? · Be safe with medicines. Read and follow all instructions on the label. ¨ If the doctor gave you a prescription medicine for pain, take it as prescribed. ¨ If you are not taking a prescription pain medicine, ask your doctor if you can take an over-the-counter medicine. · Rest and protect your knee. Take a break from any activity that may cause pain. · Put ice or a cold pack on your knee for 10 to 20 minutes at a time. Put a thin cloth between the ice and your skin. · Prop up a sore knee on a pillow when you ice it or anytime you sit or lie down for the next 3 days. Try to keep it above the level of your heart. This will help reduce swelling. · If your knee is not swollen, you can put moist heat, a heating pad, or a warm cloth on your knee. · If your doctor recommends an elastic bandage, sleeve, or other type of support for your knee, wear it as directed. · Follow your doctor's instructions about how much weight you can put on your leg. Use a cane, crutches, or a walker as instructed. · Follow your doctor's instructions about activity during your healing process. If you can do mild exercise, slowly increase your activity. · Reach and stay at a healthy weight. Extra weight can strain the joints, especially the knees and hips, and make the pain worse. Losing even a few pounds may help. When should you call for help? Call 911 anytime you think you may need emergency care. For example, call if:  ? · You have symptoms of a blood clot in your lung (called a pulmonary embolism). These may include:  ¨ Sudden chest pain. ¨ Trouble breathing. ¨ Coughing up blood. ?Call your doctor now or seek immediate medical care if:  ? · You have severe or increasing pain. ? · Your leg or foot turns cold or changes color. ? · You cannot stand or put weight on your knee. ? · Your knee looks twisted or bent out of shape. ? · You cannot move your knee.    ? · You have signs of infection, such as:  ¨ Increased pain, swelling, warmth, or redness. ¨ Red streaks leading from the knee. ¨ Pus draining from a place on your knee. ¨ A fever. ? · You have signs of a blood clot in your leg (called a deep vein thrombosis), such as:  ¨ Pain in your calf, back of the knee, thigh, or groin. ¨ Redness and swelling in your leg or groin. ? Watch closely for changes in your health, and be sure to contact your doctor if:  ? · You have tingling, weakness, or numbness in your knee. ? · You have any new symptoms, such as swelling. ? · You have bruises from a knee injury that last longer than 2 weeks. ? · You do not get better as expected. Where can you learn more? Go to http://minda-valentino.info/. Enter K195 in the search box to learn more about \"Knee Pain or Injury: Care Instructions. \"  Current as of: March 20, 2017  Content Version: 11.4  © 1779-9320 [employer]. Care instructions adapted under license by [employer] (which disclaims liability or warranty for this information). If you have questions about a medical condition or this instruction, always ask your healthcare professional. [place] [address] any warranty or liability for your use of this information. Osteoarthritis: Care Instructions  Your Care Instructions    Arthritis is a common health problem in which the joints are inflamed. There are several kinds of arthritis. Osteoarthritis is caused by a breakdown of cartilage, the hard, thick tissue that cushions the joints. It causes pain, stiffness, and swelling, often in the spine, fingers, hips, and knees. Osteoarthritis can happen at any age, but it is most common in older people. Osteoarthritis never goes away completely, but it can be controlled. Medicine and home treatment can reduce the pain and prevent the arthritis from getting worse. Follow-up care is a key part of your treatment and safety.  Be sure to make and go to all appointments, and call your doctor if you are having problems. It's also a good idea to know your test results and keep a list of the medicines you take. How can you care for yourself at home? · Take a warm shower or bath in the morning to relieve stiffness. Avoid sitting still afterwards. · If the joint is not swollen, use moist heat, like a warm, damp towel, for 20 to 30 minutes, 2 or 3 times a day. Do not use heat on a swollen joint. · If the joint is swollen, use ice or cold packs for 10 to 20 minutes, once an hour. Cold will help relieve pain and reduce inflammation. Put a thin cloth between the ice and your skin. · To prevent stiffness, gently move the joint through its full range of motion several times a day. · If the joint hurts, avoid activities that put a strain on it for a few days. Take rest breaks throughout the day. · Get regular exercise. Walking, swimming, yoga, biking, beatriz chi, and water aerobics are good exercises that are gentle on the joints. · Reach and stay at a healthy weight. If you need to lose or maintain weight, regular exercise and a healthy diet will help. Extra weight can strain the joints, especially the knees and hips, and make the pain worse. Losing even a few pounds may help. · Take pain medicines exactly as directed. ¨ If the doctor gave you a prescription medicine for pain, take it as prescribed. ¨ If you are not taking a prescription pain medicine, ask your doctor if you can take an over-the-counter medicine. When should you call for help? Call your doctor now or seek immediate medical care if:  ? · The pain is so bad that you cannot use the joint. ? · You have sudden back pain with weakness in your legs or loss of bowel or bladder control. ? · Your stools are black and tarlike or have streaks of blood. ? · You have severe pain and swelling in more than one joint. ? Watch closely for changes in your health, and be sure to contact your doctor if:  ? · You have side effects from the medicines, like belly pain, ongoing heartburn, or nausea. ? · Joint pain continues for more than 6 weeks, and home treatment is not helping. Where can you learn more? Go to http://minda-valentino.info/. Enter E375 in the search box to learn more about \"Osteoarthritis: Care Instructions. \"  Current as of: October 31, 2016  Content Version: 11.4  © 9910-5848 [employer]. Care instructions adapted under license by [employer] (which disclaims liability or warranty for this information). If you have questions about a medical condition or this instruction, always ask your healthcare professional. [place] [address] any warranty or liability for your use of this information. Preventing Falls: Care Instructions  Your Care Instructions    Getting around your home safely can be a challenge if you have injuries or health problems that make it easy for you to fall. Loose rugs and furniture in walkways are among the dangers for many older people who have problems walking or who have poor eyesight. People who have conditions such as arthritis, osteoporosis, or dementia also have to be careful not to fall. You can make your home safer with a few simple measures. Follow-up care is a key part of your treatment and safety. Be sure to make and go to all appointments, and call your doctor if you are having problems. It's also a good idea to know your test results and keep a list of the medicines you take. How can you care for yourself at home? Taking care of yourself  · You may get dizzy if you do not drink enough water. To prevent dehydration, drink plenty of fluids, enough so that your urine is light yellow or clear like water. Choose water and other caffeine-free clear liquids. If you have kidney, heart, or liver disease and have to limit fluids, talk with your doctor before you increase the amount of fluids you drink.   · Exercise regularly to improve your strength, muscle tone, and balance. Walk if you can. Swimming may be a good choice if you cannot walk easily. · Have your vision and hearing checked each year or any time you notice a change. If you have trouble seeing and hearing, you might not be able to avoid objects and could lose your balance. · Know the side effects of the medicines you take. Ask your doctor or pharmacist whether the medicines you take can affect your balance. Sleeping pills or sedatives can affect your balance. · Limit the amount of alcohol you drink. Alcohol can impair your balance and other senses. · Ask your doctor whether calluses or corns on your feet need to be removed. If you wear loose-fitting shoes because of calluses or corns, you can lose your balance and fall. · Talk to your doctor if you have numbness in your feet. Preventing falls at home  · Remove raised doorway thresholds, throw rugs, and clutter. Repair loose carpet or raised areas in the floor. · Move furniture and electrical cords to keep them out of walking paths. · Use nonskid floor wax, and wipe up spills right away, especially on ceramic tile floors. · If you use a walker or cane, put rubber tips on it. If you use crutches, clean the bottoms of them regularly with an abrasive pad, such as steel wool. · Keep your house well lit, especially [place], and outside walkways. Use night-lights in areas such as hallways and bathrooms. Add extra light switches or use remote switches (such as switches that go on or off when you clap your hands) to make it easier to turn lights on if you have to get up during the night. · Install sturdy handrails on stairways. · Move items in your cabinets so that the things you use a lot are on the lower shelves (about waist level). · Keep a cordless phone and a flashlight with new batteries by your bed. If possible, put a phone in each of the main rooms of your house, or carry a cell phone in case you fall and cannot reach a phone.  Or, you can wear a device around your neck or wrist. You push a button that sends a signal for help. · Wear low-heeled shoes that fit well and give your feet good support. Use footwear with nonskid soles. Check the heels and soles of your shoes for wear. Repair or replace worn heels or soles. · Do not wear socks without shoes on wood floors. · Walk on the grass when the sidewalks are slippery. If you live in an area that gets snow and ice in the winter, sprinkle salt on slippery steps and sidewalks. Preventing falls in the bath  · Install grab bars and nonskid mats inside and outside your shower or tub and near the toilet and sinks. · Use shower chairs and bath benches. · Use a hand-held shower head that will allow you to sit while showering. · Get into a tub or shower by putting the weaker leg in first. Get out of a tub or shower with your strong side first.  · Repair loose toilet seats and consider installing a raised toilet seat to make getting on and off the toilet easier. · Keep your bathroom door unlocked while you are in the shower. Where can you learn more? Go to http://minda-valentino.info/. Enter [id number] [id number] in the search box to learn more about \"Preventing Falls: Care Instructions. \"  Current as of: May 12, 2017  Content Version: 11.4  © 4228-3878 [employer]. Care instructions adapted under license by [employer] (which disclaims liability or warranty for this information). If you have questions about a medical condition or this instruction, always ask your healthcare professional. [place] [address] any warranty or liability for your use of this information.

## 2018-01-01 ENCOUNTER — HOME CARE VISIT (OUTPATIENT)
Dept: SCHEDULING | Facility: HOME HEALTH | Age: 78
End: 2018-01-01
Payer: MEDICARE

## 2018-01-01 ENCOUNTER — HOME CARE VISIT (OUTPATIENT)
Dept: HOSPICE | Facility: HOSPICE | Age: 78
End: 2018-01-01
Payer: MEDICARE

## 2018-01-01 ENCOUNTER — APPOINTMENT (OUTPATIENT)
Dept: GENERAL RADIOLOGY | Age: 78
DRG: 193 | End: 2018-01-01
Attending: PHYSICIAN ASSISTANT
Payer: MEDICARE

## 2018-01-01 ENCOUNTER — HOME CARE VISIT (OUTPATIENT)
Dept: HOME HEALTH SERVICES | Facility: HOME HEALTH | Age: 78
End: 2018-01-01
Payer: MEDICARE

## 2018-01-01 ENCOUNTER — HOSPITAL ENCOUNTER (INPATIENT)
Age: 78
LOS: 8 days | Discharge: NURSING, REHAB OR LONG-TERM CARE | DRG: 193 | End: 2018-08-03
Attending: EMERGENCY MEDICINE | Admitting: HOSPITALIST
Payer: MEDICARE

## 2018-01-01 ENCOUNTER — APPOINTMENT (OUTPATIENT)
Dept: ULTRASOUND IMAGING | Age: 78
DRG: 193 | End: 2018-01-01
Attending: INTERNAL MEDICINE
Payer: MEDICARE

## 2018-01-01 ENCOUNTER — APPOINTMENT (OUTPATIENT)
Dept: CT IMAGING | Age: 78
DRG: 193 | End: 2018-01-01
Attending: EMERGENCY MEDICINE
Payer: MEDICARE

## 2018-01-01 ENCOUNTER — HOSPITAL ENCOUNTER (INPATIENT)
Age: 78
LOS: 3 days | Discharge: NURSING, REHAB OR LONG-TERM CARE | DRG: 309 | End: 2018-06-26
Attending: INTERNAL MEDICINE | Admitting: INTERNAL MEDICINE
Payer: MEDICARE

## 2018-01-01 ENCOUNTER — HOSPITAL ENCOUNTER (OUTPATIENT)
Dept: LAB | Age: 78
Discharge: HOME | End: 2018-07-02
Payer: MEDICARE

## 2018-01-01 ENCOUNTER — HOSPITAL ENCOUNTER (OUTPATIENT)
Dept: LAB | Age: 78
Discharge: HOME | End: 2018-06-11
Payer: MEDICARE

## 2018-01-01 ENCOUNTER — HOSPITAL ENCOUNTER (OUTPATIENT)
Dept: LAB | Age: 78
Discharge: HOME | End: 2018-04-10
Payer: MEDICARE

## 2018-01-01 ENCOUNTER — APPOINTMENT (OUTPATIENT)
Dept: GENERAL RADIOLOGY | Age: 78
DRG: 193 | End: 2018-01-01
Attending: INTERNAL MEDICINE
Payer: MEDICARE

## 2018-01-01 ENCOUNTER — APPOINTMENT (OUTPATIENT)
Dept: GENERAL RADIOLOGY | Age: 78
DRG: 193 | End: 2018-01-01
Attending: EMERGENCY MEDICINE
Payer: MEDICARE

## 2018-01-01 ENCOUNTER — HOSPITAL ENCOUNTER (EMERGENCY)
Age: 78
Discharge: HOME | End: 2018-08-08
Attending: EMERGENCY MEDICINE
Payer: MEDICARE

## 2018-01-01 ENCOUNTER — APPOINTMENT (OUTPATIENT)
Dept: GENERAL RADIOLOGY | Age: 78
DRG: 193 | End: 2018-01-01
Attending: HOSPITALIST
Payer: MEDICARE

## 2018-01-01 ENCOUNTER — APPOINTMENT (OUTPATIENT)
Dept: MRI IMAGING | Age: 78
DRG: 193 | End: 2018-01-01
Attending: HOSPITALIST
Payer: MEDICARE

## 2018-01-01 ENCOUNTER — APPOINTMENT (OUTPATIENT)
Dept: NON INVASIVE DIAGNOSTICS | Age: 78
DRG: 309 | End: 2018-01-01
Attending: INTERNAL MEDICINE
Payer: MEDICARE

## 2018-01-01 ENCOUNTER — HOME HEALTH ADMISSION (OUTPATIENT)
Dept: HOME HEALTH SERVICES | Facility: HOME HEALTH | Age: 78
End: 2018-01-01
Payer: MEDICARE

## 2018-01-01 ENCOUNTER — APPOINTMENT (OUTPATIENT)
Dept: GENERAL RADIOLOGY | Age: 78
DRG: 309 | End: 2018-01-01
Attending: HOSPITALIST
Payer: MEDICARE

## 2018-01-01 ENCOUNTER — HOSPICE ADMISSION (OUTPATIENT)
Dept: HOSPICE | Facility: HOSPICE | Age: 78
End: 2018-01-01
Payer: MEDICARE

## 2018-01-01 VITALS
SYSTOLIC BLOOD PRESSURE: 144 MMHG | OXYGEN SATURATION: 98 % | TEMPERATURE: 98.2 F | HEART RATE: 58 BPM | DIASTOLIC BLOOD PRESSURE: 61 MMHG

## 2018-01-01 VITALS
HEART RATE: 57 BPM | SYSTOLIC BLOOD PRESSURE: 142 MMHG | OXYGEN SATURATION: 97 % | TEMPERATURE: 97 F | DIASTOLIC BLOOD PRESSURE: 63 MMHG

## 2018-01-01 VITALS
SYSTOLIC BLOOD PRESSURE: 148 MMHG | RESPIRATION RATE: 18 BRPM | OXYGEN SATURATION: 87 % | HEART RATE: 60 BPM | TEMPERATURE: 97.7 F | DIASTOLIC BLOOD PRESSURE: 80 MMHG

## 2018-01-01 VITALS
OXYGEN SATURATION: 97 % | TEMPERATURE: 98.1 F | SYSTOLIC BLOOD PRESSURE: 147 MMHG | DIASTOLIC BLOOD PRESSURE: 79 MMHG | HEART RATE: 61 BPM

## 2018-01-01 VITALS
TEMPERATURE: 97.6 F | BODY MASS INDEX: 34.32 KG/M2 | SYSTOLIC BLOOD PRESSURE: 154 MMHG | DIASTOLIC BLOOD PRESSURE: 67 MMHG | WEIGHT: 206 LBS | OXYGEN SATURATION: 100 % | HEIGHT: 65 IN | RESPIRATION RATE: 18 BRPM | HEART RATE: 56 BPM

## 2018-01-01 VITALS
SYSTOLIC BLOOD PRESSURE: 132 MMHG | HEART RATE: 65 BPM | TEMPERATURE: 97.3 F | DIASTOLIC BLOOD PRESSURE: 65 MMHG | OXYGEN SATURATION: 98 %

## 2018-01-01 VITALS
SYSTOLIC BLOOD PRESSURE: 124 MMHG | TEMPERATURE: 98.1 F | TEMPERATURE: 97.4 F | OXYGEN SATURATION: 98 % | HEART RATE: 50 BPM | DIASTOLIC BLOOD PRESSURE: 70 MMHG | OXYGEN SATURATION: 98 % | HEART RATE: 58 BPM | DIASTOLIC BLOOD PRESSURE: 74 MMHG | SYSTOLIC BLOOD PRESSURE: 124 MMHG

## 2018-01-01 VITALS
HEART RATE: 68 BPM | DIASTOLIC BLOOD PRESSURE: 59 MMHG | OXYGEN SATURATION: 97 % | SYSTOLIC BLOOD PRESSURE: 101 MMHG | TEMPERATURE: 98.4 F

## 2018-01-01 VITALS
HEART RATE: 75 BPM | DIASTOLIC BLOOD PRESSURE: 80 MMHG | OXYGEN SATURATION: 98 % | TEMPERATURE: 97.4 F | SYSTOLIC BLOOD PRESSURE: 130 MMHG

## 2018-01-01 VITALS
OXYGEN SATURATION: 89 % | DIASTOLIC BLOOD PRESSURE: 52 MMHG | RESPIRATION RATE: 24 BRPM | SYSTOLIC BLOOD PRESSURE: 99 MMHG | HEART RATE: 105 BPM

## 2018-01-01 VITALS
TEMPERATURE: 96.8 F | HEIGHT: 65 IN | DIASTOLIC BLOOD PRESSURE: 88 MMHG | SYSTOLIC BLOOD PRESSURE: 133 MMHG | HEART RATE: 131 BPM | BODY MASS INDEX: 36.65 KG/M2 | RESPIRATION RATE: 18 BRPM | WEIGHT: 220 LBS

## 2018-01-01 VITALS
HEART RATE: 56 BPM | TEMPERATURE: 97.2 F | SYSTOLIC BLOOD PRESSURE: 135 MMHG | OXYGEN SATURATION: 96 % | DIASTOLIC BLOOD PRESSURE: 60 MMHG

## 2018-01-01 VITALS
OXYGEN SATURATION: 98 % | HEART RATE: 72 BPM | SYSTOLIC BLOOD PRESSURE: 130 MMHG | TEMPERATURE: 97.7 F | DIASTOLIC BLOOD PRESSURE: 80 MMHG

## 2018-01-01 VITALS
HEART RATE: 52 BPM | DIASTOLIC BLOOD PRESSURE: 86 MMHG | SYSTOLIC BLOOD PRESSURE: 129 MMHG | RESPIRATION RATE: 18 BRPM | OXYGEN SATURATION: 90 %

## 2018-01-01 VITALS
OXYGEN SATURATION: 97 % | RESPIRATION RATE: 20 BRPM | HEART RATE: 56 BPM | DIASTOLIC BLOOD PRESSURE: 86 MMHG | SYSTOLIC BLOOD PRESSURE: 143 MMHG

## 2018-01-01 VITALS
BODY MASS INDEX: 37.49 KG/M2 | DIASTOLIC BLOOD PRESSURE: 76 MMHG | WEIGHT: 225 LBS | SYSTOLIC BLOOD PRESSURE: 115 MMHG | RESPIRATION RATE: 18 BRPM | HEART RATE: 94 BPM | OXYGEN SATURATION: 95 % | TEMPERATURE: 97.3 F | HEIGHT: 65 IN

## 2018-01-01 VITALS
HEART RATE: 50 BPM | OXYGEN SATURATION: 100 % | WEIGHT: 229.31 LBS | SYSTOLIC BLOOD PRESSURE: 160 MMHG | DIASTOLIC BLOOD PRESSURE: 66 MMHG | RESPIRATION RATE: 18 BRPM | BODY MASS INDEX: 38.2 KG/M2 | HEIGHT: 65 IN | TEMPERATURE: 98 F

## 2018-01-01 VITALS
HEART RATE: 77 BPM | TEMPERATURE: 98.8 F | DIASTOLIC BLOOD PRESSURE: 82 MMHG | OXYGEN SATURATION: 97 % | SYSTOLIC BLOOD PRESSURE: 122 MMHG

## 2018-01-01 VITALS
SYSTOLIC BLOOD PRESSURE: 145 MMHG | TEMPERATURE: 98 F | OXYGEN SATURATION: 93 % | HEART RATE: 65 BPM | DIASTOLIC BLOOD PRESSURE: 73 MMHG

## 2018-01-01 VITALS
SYSTOLIC BLOOD PRESSURE: 145 MMHG | OXYGEN SATURATION: 98 % | HEART RATE: 57 BPM | DIASTOLIC BLOOD PRESSURE: 65 MMHG | TEMPERATURE: 97.4 F

## 2018-01-01 VITALS
SYSTOLIC BLOOD PRESSURE: 130 MMHG | DIASTOLIC BLOOD PRESSURE: 50 MMHG | OXYGEN SATURATION: 98 % | TEMPERATURE: 97.9 F | HEART RATE: 52 BPM

## 2018-01-01 VITALS
TEMPERATURE: 97.6 F | DIASTOLIC BLOOD PRESSURE: 65 MMHG | HEART RATE: 52 BPM | OXYGEN SATURATION: 98 % | SYSTOLIC BLOOD PRESSURE: 115 MMHG

## 2018-01-01 VITALS
SYSTOLIC BLOOD PRESSURE: 140 MMHG | OXYGEN SATURATION: 97 % | TEMPERATURE: 97.9 F | DIASTOLIC BLOOD PRESSURE: 66 MMHG | HEART RATE: 59 BPM

## 2018-01-01 VITALS
SYSTOLIC BLOOD PRESSURE: 117 MMHG | OXYGEN SATURATION: 97 % | DIASTOLIC BLOOD PRESSURE: 54 MMHG | TEMPERATURE: 98.3 F | HEART RATE: 57 BPM

## 2018-01-01 VITALS
HEART RATE: 55 BPM | DIASTOLIC BLOOD PRESSURE: 62 MMHG | OXYGEN SATURATION: 98 % | SYSTOLIC BLOOD PRESSURE: 131 MMHG | TEMPERATURE: 98 F

## 2018-01-01 VITALS
OXYGEN SATURATION: 97 % | DIASTOLIC BLOOD PRESSURE: 67 MMHG | SYSTOLIC BLOOD PRESSURE: 137 MMHG | HEART RATE: 58 BPM | TEMPERATURE: 97.5 F

## 2018-01-01 VITALS
SYSTOLIC BLOOD PRESSURE: 110 MMHG | OXYGEN SATURATION: 99 % | TEMPERATURE: 98.4 F | DIASTOLIC BLOOD PRESSURE: 62 MMHG | HEART RATE: 70 BPM

## 2018-01-01 VITALS
OXYGEN SATURATION: 98 % | HEART RATE: 78 BPM | TEMPERATURE: 97.8 F | SYSTOLIC BLOOD PRESSURE: 133 MMHG | DIASTOLIC BLOOD PRESSURE: 78 MMHG

## 2018-01-01 VITALS
HEART RATE: 54 BPM | TEMPERATURE: 98.9 F | OXYGEN SATURATION: 98 % | SYSTOLIC BLOOD PRESSURE: 129 MMHG | DIASTOLIC BLOOD PRESSURE: 55 MMHG

## 2018-01-01 VITALS — OXYGEN SATURATION: 74 % | HEART RATE: 66 BPM | RESPIRATION RATE: 24 BRPM

## 2018-01-01 VITALS
SYSTOLIC BLOOD PRESSURE: 140 MMHG | OXYGEN SATURATION: 98 % | TEMPERATURE: 98 F | DIASTOLIC BLOOD PRESSURE: 80 MMHG | HEART RATE: 74 BPM

## 2018-01-01 DIAGNOSIS — J18.9 HCAP (HEALTHCARE-ASSOCIATED PNEUMONIA): Primary | ICD-10-CM

## 2018-01-01 DIAGNOSIS — E83.42 HYPOMAGNESEMIA: ICD-10-CM

## 2018-01-01 DIAGNOSIS — I50.9 CONGESTIVE HEART FAILURE, UNSPECIFIED HF CHRONICITY, UNSPECIFIED HEART FAILURE TYPE (HCC): ICD-10-CM

## 2018-01-01 DIAGNOSIS — R06.02 SHORTNESS OF BREATH: ICD-10-CM

## 2018-01-01 DIAGNOSIS — E16.2 HYPOGLYCEMIA: Primary | ICD-10-CM

## 2018-01-01 DIAGNOSIS — E87.6 HYPOKALEMIA: ICD-10-CM

## 2018-01-01 LAB
ALBUMIN SERPL-MCNC: 2.6 G/DL (ref 3.4–5)
ALBUMIN SERPL-MCNC: 2.9 G/DL (ref 3.4–5)
ALBUMIN SERPL-MCNC: 3 G/DL (ref 3.4–5)
ALBUMIN/GLOB SERPL: 0.6 {RATIO} (ref 0.8–1.7)
ALBUMIN/GLOB SERPL: 0.7 {RATIO} (ref 0.8–1.7)
ALBUMIN/GLOB SERPL: 0.9 {RATIO} (ref 0.8–1.7)
ALP SERPL-CCNC: 80 U/L (ref 45–117)
ALP SERPL-CCNC: 80 U/L (ref 45–117)
ALP SERPL-CCNC: 83 U/L (ref 45–117)
ALT SERPL-CCNC: 17 U/L (ref 13–56)
ALT SERPL-CCNC: 25 U/L (ref 13–56)
ALT SERPL-CCNC: 9 U/L (ref 13–56)
AMMONIA PLAS-SCNC: 59 UMOL/L (ref 11–32)
ANION GAP SERPL CALC-SCNC: 10 MMOL/L (ref 3–18)
ANION GAP SERPL CALC-SCNC: 10 MMOL/L (ref 3–18)
ANION GAP SERPL CALC-SCNC: 11 MMOL/L (ref 3–18)
ANION GAP SERPL CALC-SCNC: 12 MMOL/L (ref 3–18)
ANION GAP SERPL CALC-SCNC: 13 MMOL/L (ref 3–18)
ANION GAP SERPL CALC-SCNC: 14 MMOL/L (ref 3–18)
ANION GAP SERPL CALC-SCNC: 7 MMOL/L (ref 3–18)
ANION GAP SERPL CALC-SCNC: 8 MMOL/L (ref 3–18)
ANION GAP SERPL CALC-SCNC: 9 MMOL/L (ref 3–18)
APPEARANCE FLD: CLEAR
APPEARANCE UR: ABNORMAL
APPEARANCE UR: CLEAR
APTT PPP: 47 SEC (ref 23–36.4)
ARTERIAL PATENCY WRIST A: YES
AST SERPL-CCNC: 15 U/L (ref 15–37)
AST SERPL-CCNC: 16 U/L (ref 15–37)
AST SERPL-CCNC: 21 U/L (ref 15–37)
ATRIAL RATE: 96 BPM
BACTERIA SPEC CULT: ABNORMAL
BACTERIA SPEC CULT: NORMAL
BACTERIA URNS QL MICRO: ABNORMAL /HPF
BASE DEFICIT BLD-SCNC: 4 MMOL/L
BASOPHILS # BLD: 0 K/UL (ref 0–0.06)
BASOPHILS # BLD: 0 K/UL (ref 0–0.06)
BASOPHILS # BLD: 0 K/UL (ref 0–0.1)
BASOPHILS NFR BLD: 0 % (ref 0–2)
BASOPHILS NFR BLD: 0 % (ref 0–3)
BDY SITE: ABNORMAL
BILIRUB SERPL-MCNC: 0.6 MG/DL (ref 0.2–1)
BILIRUB SERPL-MCNC: 0.7 MG/DL (ref 0.2–1)
BILIRUB SERPL-MCNC: 1 MG/DL (ref 0.2–1)
BILIRUB UR QL: NEGATIVE
BNP SERPL-MCNC: 5451 PG/ML (ref 0–1800)
BNP SERPL-MCNC: 6620 PG/ML (ref 0–1800)
BODY FLD TYPE: NORMAL
BODY TEMPERATURE: 36.8
BUN SERPL-MCNC: 12 MG/DL (ref 7–18)
BUN SERPL-MCNC: 14 MG/DL (ref 7–18)
BUN SERPL-MCNC: 14 MG/DL (ref 7–18)
BUN SERPL-MCNC: 15 MG/DL (ref 7–18)
BUN SERPL-MCNC: 16 MG/DL (ref 7–18)
BUN SERPL-MCNC: 17 MG/DL (ref 7–18)
BUN SERPL-MCNC: 19 MG/DL (ref 7–18)
BUN SERPL-MCNC: 22 MG/DL (ref 7–18)
BUN SERPL-MCNC: 32 MG/DL (ref 7–18)
BUN SERPL-MCNC: 8 MG/DL (ref 7–18)
BUN SERPL-MCNC: 9 MG/DL (ref 7–18)
BUN/CREAT SERPL: 10 (ref 12–20)
BUN/CREAT SERPL: 10 (ref 12–20)
BUN/CREAT SERPL: 11 (ref 12–20)
BUN/CREAT SERPL: 12 (ref 12–20)
BUN/CREAT SERPL: 12 (ref 12–20)
BUN/CREAT SERPL: 14 (ref 12–20)
BUN/CREAT SERPL: 15 (ref 12–20)
BUN/CREAT SERPL: 20 (ref 12–20)
BUN/CREAT SERPL: 6 (ref 12–20)
BUN/CREAT SERPL: 7 (ref 12–20)
BUN/CREAT SERPL: 9 (ref 12–20)
CALCIUM SERPL-MCNC: 7.6 MG/DL (ref 8.5–10.1)
CALCIUM SERPL-MCNC: 7.8 MG/DL (ref 8.5–10.1)
CALCIUM SERPL-MCNC: 7.9 MG/DL (ref 8.5–10.1)
CALCIUM SERPL-MCNC: 8 MG/DL (ref 8.5–10.1)
CALCIUM SERPL-MCNC: 8 MG/DL (ref 8.5–10.1)
CALCIUM SERPL-MCNC: 8.1 MG/DL (ref 8.5–10.1)
CALCIUM SERPL-MCNC: 8.2 MG/DL (ref 8.5–10.1)
CALCIUM SERPL-MCNC: 8.3 MG/DL (ref 8.5–10.1)
CALCIUM SERPL-MCNC: 8.4 MG/DL (ref 8.5–10.1)
CALCIUM SERPL-MCNC: 8.5 MG/DL (ref 8.5–10.1)
CALCIUM SERPL-MCNC: 8.6 MG/DL (ref 8.5–10.1)
CALCULATED R AXIS, ECG10: 45 DEGREES
CALCULATED T AXIS, ECG11: 158 DEGREES
CHLORIDE SERPL-SCNC: 100 MMOL/L (ref 100–108)
CHLORIDE SERPL-SCNC: 100 MMOL/L (ref 100–108)
CHLORIDE SERPL-SCNC: 102 MMOL/L (ref 100–108)
CHLORIDE SERPL-SCNC: 104 MMOL/L (ref 100–108)
CHLORIDE SERPL-SCNC: 107 MMOL/L (ref 100–108)
CHLORIDE SERPL-SCNC: 110 MMOL/L (ref 100–108)
CHLORIDE SERPL-SCNC: 110 MMOL/L (ref 100–108)
CHLORIDE SERPL-SCNC: 111 MMOL/L (ref 100–108)
CHLORIDE SERPL-SCNC: 111 MMOL/L (ref 100–108)
CHLORIDE SERPL-SCNC: 114 MMOL/L (ref 100–108)
CHLORIDE SERPL-SCNC: 115 MMOL/L (ref 100–108)
CHLORIDE SERPL-SCNC: 115 MMOL/L (ref 100–108)
CHLORIDE SERPL-SCNC: 99 MMOL/L (ref 100–108)
CK MB CFR SERPL CALC: 1.4 % (ref 0–4)
CK MB CFR SERPL CALC: 1.6 % (ref 0–4)
CK MB CFR SERPL CALC: 1.7 % (ref 0–4)
CK MB SERPL-MCNC: 1.1 NG/ML (ref 5–25)
CK MB SERPL-MCNC: 1.8 NG/ML (ref 5–25)
CK MB SERPL-MCNC: 2 NG/ML (ref 5–25)
CK SERPL-CCNC: 113 U/L (ref 26–192)
CK SERPL-CCNC: 117 U/L (ref 26–192)
CK SERPL-CCNC: 76 U/L (ref 26–192)
CO2 SERPL-SCNC: 17 MMOL/L (ref 21–32)
CO2 SERPL-SCNC: 18 MMOL/L (ref 21–32)
CO2 SERPL-SCNC: 19 MMOL/L (ref 21–32)
CO2 SERPL-SCNC: 19 MMOL/L (ref 21–32)
CO2 SERPL-SCNC: 20 MMOL/L (ref 21–32)
CO2 SERPL-SCNC: 21 MMOL/L (ref 21–32)
CO2 SERPL-SCNC: 21 MMOL/L (ref 21–32)
CO2 SERPL-SCNC: 22 MMOL/L (ref 21–32)
CO2 SERPL-SCNC: 22 MMOL/L (ref 21–32)
CO2 SERPL-SCNC: 23 MMOL/L (ref 21–32)
CO2 SERPL-SCNC: 24 MMOL/L (ref 21–32)
COLOR FLD: ABNORMAL
COLOR UR: YELLOW
CREAT SERPL-MCNC: 0.89 MG/DL (ref 0.6–1.3)
CREAT SERPL-MCNC: 0.93 MG/DL (ref 0.6–1.3)
CREAT SERPL-MCNC: 1.16 MG/DL (ref 0.6–1.3)
CREAT SERPL-MCNC: 1.18 MG/DL (ref 0.6–1.3)
CREAT SERPL-MCNC: 1.27 MG/DL (ref 0.6–1.3)
CREAT SERPL-MCNC: 1.38 MG/DL (ref 0.6–1.3)
CREAT SERPL-MCNC: 1.45 MG/DL (ref 0.6–1.3)
CREAT SERPL-MCNC: 1.57 MG/DL (ref 0.6–1.3)
CREAT SERPL-MCNC: 1.79 MG/DL (ref 0.6–1.3)
CREAT SERPL-MCNC: 1.82 MG/DL (ref 0.6–1.3)
CREAT SERPL-MCNC: 2.18 MG/DL (ref 0.6–1.3)
CREAT SERPL-MCNC: 2.54 MG/DL (ref 0.6–1.3)
CREAT SERPL-MCNC: 2.54 MG/DL (ref 0.6–1.3)
DATE LAST DOSE: ABNORMAL
DIAGNOSIS, 93000: NORMAL
DIFFERENTIAL METHOD BLD: ABNORMAL
ECHO AO ROOT DIAM: 3.06 CM
ECHO LA AREA 4C: 16.6 CM2
ECHO LA VOL 2C: 50.6 ML (ref 22–52)
ECHO LA VOL 4C: 37.37 ML (ref 22–52)
ECHO LA VOLUME INDEX A2C: 24.38 ML/M2
ECHO LA VOLUME INDEX A4C: 18 ML/M2
ECHO LV INTERNAL DIMENSION DIASTOLIC: 5.22 CM (ref 3.9–5.3)
ECHO LV INTERNAL DIMENSION SYSTOLIC: 4.8 CM
ECHO LV IVSD: 1.42 CM (ref 0.6–0.9)
ECHO LV MASS 2D: 404.4 G (ref 67–162)
ECHO LV MASS INDEX 2D: 194.8 G/M2
ECHO LV POSTERIOR WALL DIASTOLIC: 1.52 CM (ref 0.6–0.9)
ECHO LVOT DIAM: 1.84 CM
ECHO RV TAPSE: 1.08 CM (ref 1.5–2)
ECHO TV REGURGITANT MAX VELOCITY: 249.86 CM/S
ECHO TV REGURGITANT PEAK GRADIENT: 25 MMHG
EOSINOPHIL # BLD: 0 K/UL (ref 0–0.4)
EOSINOPHIL # BLD: 0.1 K/UL (ref 0–0.4)
EOSINOPHIL NFR BLD: 0 % (ref 0–5)
EOSINOPHIL NFR BLD: 0 % (ref 0–5)
EOSINOPHIL NFR BLD: 1 % (ref 0–5)
EOSINOPHIL NFR BLD: 2 % (ref 0–5)
EOSINOPHIL NFR FLD MANUAL: 0 %
EPITH CASTS URNS QL MICRO: ABNORMAL /LPF (ref 0–5)
ERYTHROCYTE [DISTWIDTH] IN BLOOD BY AUTOMATED COUNT: 14.1 % (ref 11.6–14.5)
ERYTHROCYTE [DISTWIDTH] IN BLOOD BY AUTOMATED COUNT: 14.2 % (ref 11.6–14.5)
ERYTHROCYTE [DISTWIDTH] IN BLOOD BY AUTOMATED COUNT: 14.4 % (ref 11.6–14.5)
ERYTHROCYTE [DISTWIDTH] IN BLOOD BY AUTOMATED COUNT: 14.7 % (ref 11.6–14.5)
ERYTHROCYTE [DISTWIDTH] IN BLOOD BY AUTOMATED COUNT: 15 % (ref 11.6–14.5)
ERYTHROCYTE [DISTWIDTH] IN BLOOD BY AUTOMATED COUNT: 15 % (ref 11.6–14.5)
ERYTHROCYTE [DISTWIDTH] IN BLOOD BY AUTOMATED COUNT: 15.1 % (ref 11.6–14.5)
ERYTHROCYTE [DISTWIDTH] IN BLOOD BY AUTOMATED COUNT: 15.2 % (ref 11.6–14.5)
ERYTHROCYTE [DISTWIDTH] IN BLOOD BY AUTOMATED COUNT: 15.2 % (ref 11.6–14.5)
ERYTHROCYTE [DISTWIDTH] IN BLOOD BY AUTOMATED COUNT: 15.3 % (ref 11.6–14.5)
EST. AVERAGE GLUCOSE BLD GHB EST-MCNC: 197 MG/DL
EST. AVERAGE GLUCOSE BLD GHB EST-MCNC: 206 MG/DL
FOLATE SERPL-MCNC: >20 NG/ML (ref 3.1–17.5)
GAS FLOW.O2 O2 DELIVERY SYS: ABNORMAL L/MIN
GLOBULIN SER CALC-MCNC: 3.5 G/DL (ref 2–4)
GLOBULIN SER CALC-MCNC: 3.9 G/DL (ref 2–4)
GLOBULIN SER CALC-MCNC: 4.2 G/DL (ref 2–4)
GLUCOSE BLD STRIP.AUTO-MCNC: 102 MG/DL (ref 70–110)
GLUCOSE BLD STRIP.AUTO-MCNC: 114 MG/DL (ref 70–110)
GLUCOSE BLD STRIP.AUTO-MCNC: 116 MG/DL (ref 70–110)
GLUCOSE BLD STRIP.AUTO-MCNC: 119 MG/DL (ref 70–110)
GLUCOSE BLD STRIP.AUTO-MCNC: 121 MG/DL (ref 70–110)
GLUCOSE BLD STRIP.AUTO-MCNC: 123 MG/DL (ref 70–110)
GLUCOSE BLD STRIP.AUTO-MCNC: 127 MG/DL (ref 70–110)
GLUCOSE BLD STRIP.AUTO-MCNC: 129 MG/DL (ref 70–110)
GLUCOSE BLD STRIP.AUTO-MCNC: 133 MG/DL (ref 70–110)
GLUCOSE BLD STRIP.AUTO-MCNC: 134 MG/DL (ref 70–110)
GLUCOSE BLD STRIP.AUTO-MCNC: 134 MG/DL (ref 70–110)
GLUCOSE BLD STRIP.AUTO-MCNC: 135 MG/DL (ref 70–110)
GLUCOSE BLD STRIP.AUTO-MCNC: 136 MG/DL (ref 70–110)
GLUCOSE BLD STRIP.AUTO-MCNC: 136 MG/DL (ref 70–110)
GLUCOSE BLD STRIP.AUTO-MCNC: 138 MG/DL (ref 70–110)
GLUCOSE BLD STRIP.AUTO-MCNC: 145 MG/DL (ref 70–110)
GLUCOSE BLD STRIP.AUTO-MCNC: 158 MG/DL (ref 70–110)
GLUCOSE BLD STRIP.AUTO-MCNC: 162 MG/DL (ref 70–110)
GLUCOSE BLD STRIP.AUTO-MCNC: 166 MG/DL (ref 70–110)
GLUCOSE BLD STRIP.AUTO-MCNC: 167 MG/DL (ref 70–110)
GLUCOSE BLD STRIP.AUTO-MCNC: 167 MG/DL (ref 70–110)
GLUCOSE BLD STRIP.AUTO-MCNC: 168 MG/DL (ref 70–110)
GLUCOSE BLD STRIP.AUTO-MCNC: 171 MG/DL (ref 70–110)
GLUCOSE BLD STRIP.AUTO-MCNC: 175 MG/DL (ref 70–110)
GLUCOSE BLD STRIP.AUTO-MCNC: 182 MG/DL (ref 70–110)
GLUCOSE BLD STRIP.AUTO-MCNC: 186 MG/DL (ref 70–110)
GLUCOSE BLD STRIP.AUTO-MCNC: 188 MG/DL (ref 70–110)
GLUCOSE BLD STRIP.AUTO-MCNC: 188 MG/DL (ref 70–110)
GLUCOSE BLD STRIP.AUTO-MCNC: 196 MG/DL (ref 70–110)
GLUCOSE BLD STRIP.AUTO-MCNC: 202 MG/DL (ref 70–110)
GLUCOSE BLD STRIP.AUTO-MCNC: 203 MG/DL (ref 70–110)
GLUCOSE BLD STRIP.AUTO-MCNC: 210 MG/DL (ref 70–110)
GLUCOSE BLD STRIP.AUTO-MCNC: 218 MG/DL (ref 70–110)
GLUCOSE BLD STRIP.AUTO-MCNC: 220 MG/DL (ref 70–110)
GLUCOSE BLD STRIP.AUTO-MCNC: 228 MG/DL (ref 70–110)
GLUCOSE BLD STRIP.AUTO-MCNC: 231 MG/DL (ref 70–110)
GLUCOSE BLD STRIP.AUTO-MCNC: 232 MG/DL (ref 70–110)
GLUCOSE BLD STRIP.AUTO-MCNC: 233 MG/DL (ref 70–110)
GLUCOSE BLD STRIP.AUTO-MCNC: 237 MG/DL (ref 70–110)
GLUCOSE BLD STRIP.AUTO-MCNC: 242 MG/DL (ref 70–110)
GLUCOSE BLD STRIP.AUTO-MCNC: 252 MG/DL (ref 70–110)
GLUCOSE BLD STRIP.AUTO-MCNC: 267 MG/DL (ref 70–110)
GLUCOSE BLD STRIP.AUTO-MCNC: 291 MG/DL (ref 70–110)
GLUCOSE BLD STRIP.AUTO-MCNC: 317 MG/DL (ref 70–110)
GLUCOSE BLD STRIP.AUTO-MCNC: 488 MG/DL (ref 70–110)
GLUCOSE BLD STRIP.AUTO-MCNC: 83 MG/DL (ref 70–110)
GLUCOSE BLD STRIP.AUTO-MCNC: 84 MG/DL (ref 70–110)
GLUCOSE BLD STRIP.AUTO-MCNC: 86 MG/DL (ref 70–110)
GLUCOSE FLD-MCNC: 162 MG/DL
GLUCOSE SERPL-MCNC: 112 MG/DL (ref 74–99)
GLUCOSE SERPL-MCNC: 124 MG/DL (ref 74–99)
GLUCOSE SERPL-MCNC: 132 MG/DL (ref 74–99)
GLUCOSE SERPL-MCNC: 156 MG/DL (ref 74–99)
GLUCOSE SERPL-MCNC: 158 MG/DL (ref 74–99)
GLUCOSE SERPL-MCNC: 158 MG/DL (ref 74–99)
GLUCOSE SERPL-MCNC: 166 MG/DL (ref 74–99)
GLUCOSE SERPL-MCNC: 172 MG/DL (ref 74–99)
GLUCOSE SERPL-MCNC: 185 MG/DL (ref 74–99)
GLUCOSE SERPL-MCNC: 189 MG/DL (ref 74–99)
GLUCOSE SERPL-MCNC: 240 MG/DL (ref 74–99)
GLUCOSE SERPL-MCNC: 248 MG/DL (ref 74–99)
GLUCOSE SERPL-MCNC: 78 MG/DL (ref 74–99)
GLUCOSE UR STRIP.AUTO-MCNC: 250 MG/DL
GLUCOSE UR STRIP.AUTO-MCNC: 250 MG/DL
GLUCOSE UR STRIP.AUTO-MCNC: NEGATIVE MG/DL
GLUCOSE UR STRIP.AUTO-MCNC: NEGATIVE MG/DL
GRAM STN SPEC: NORMAL
GRAM STN SPEC: NORMAL
HBA1C MFR BLD: 8.5 % (ref 4.2–5.6)
HBA1C MFR BLD: 8.8 % (ref 4.2–5.6)
HCO3 BLD-SCNC: 20.7 MMOL/L (ref 22–26)
HCT VFR BLD AUTO: 29.6 % (ref 35–45)
HCT VFR BLD AUTO: 29.6 % (ref 35–45)
HCT VFR BLD AUTO: 30 % (ref 35–45)
HCT VFR BLD AUTO: 31.1 % (ref 35–45)
HCT VFR BLD AUTO: 31.2 % (ref 35–45)
HCT VFR BLD AUTO: 32.3 % (ref 35–45)
HCT VFR BLD AUTO: 32.6 % (ref 35–45)
HCT VFR BLD AUTO: 32.7 % (ref 35–45)
HCT VFR BLD AUTO: 32.9 % (ref 35–45)
HCT VFR BLD AUTO: 34.7 % (ref 35–45)
HGB BLD-MCNC: 10.1 G/DL (ref 12–16)
HGB BLD-MCNC: 10.2 G/DL (ref 12–16)
HGB BLD-MCNC: 10.3 G/DL (ref 12–16)
HGB BLD-MCNC: 10.3 G/DL (ref 12–16)
HGB BLD-MCNC: 10.4 G/DL (ref 12–16)
HGB BLD-MCNC: 10.5 G/DL (ref 12–16)
HGB BLD-MCNC: 10.7 G/DL (ref 12–16)
HGB BLD-MCNC: 10.9 G/DL (ref 12–16)
HGB BLD-MCNC: 10.9 G/DL (ref 12–16)
HGB BLD-MCNC: 9.9 G/DL (ref 12–16)
HGB UR QL STRIP: NEGATIVE
HYALINE CASTS URNS QL MICRO: ABNORMAL /LPF (ref 0–2)
INR PPP: 1.6 (ref 0.8–1.2)
INR PPP: 1.7 (ref 0.8–1.2)
KETONES UR QL STRIP.AUTO: ABNORMAL MG/DL
KETONES UR QL STRIP.AUTO: NEGATIVE MG/DL
LACTATE BLD-SCNC: 1.8 MMOL/L (ref 0.4–2)
LDH FLD L TO P-CCNC: 256 U/L
LDH SERPL L TO P-CCNC: 271 U/L (ref 81–234)
LEUKOCYTE ESTERASE UR QL STRIP.AUTO: ABNORMAL
LEUKOCYTE ESTERASE UR QL STRIP.AUTO: NEGATIVE
LYMPHOCYTES # BLD: 0.6 K/UL (ref 0.9–3.6)
LYMPHOCYTES # BLD: 0.7 K/UL (ref 0.8–3.5)
LYMPHOCYTES # BLD: 0.8 K/UL (ref 0.9–3.6)
LYMPHOCYTES # BLD: 0.8 K/UL (ref 0.9–3.6)
LYMPHOCYTES # BLD: 0.9 K/UL (ref 0.9–3.6)
LYMPHOCYTES # BLD: 1 K/UL (ref 0.9–3.6)
LYMPHOCYTES # BLD: 1 K/UL (ref 0.9–3.6)
LYMPHOCYTES # BLD: 1.1 K/UL (ref 0.9–3.6)
LYMPHOCYTES NFR BLD: 13 % (ref 21–52)
LYMPHOCYTES NFR BLD: 13 % (ref 21–52)
LYMPHOCYTES NFR BLD: 14 % (ref 21–52)
LYMPHOCYTES NFR BLD: 16 % (ref 21–52)
LYMPHOCYTES NFR BLD: 17 % (ref 21–52)
LYMPHOCYTES NFR BLD: 18 % (ref 21–52)
LYMPHOCYTES NFR BLD: 7 % (ref 21–52)
LYMPHOCYTES NFR BLD: 9 % (ref 20–51)
LYMPHOCYTES NFR FLD: 20 %
MACROPHAGES NFR FLD: 70 %
MAGNESIUM SERPL-MCNC: 1.2 MG/DL (ref 1.6–2.6)
MAGNESIUM SERPL-MCNC: 1.6 MG/DL (ref 1.6–2.6)
MAGNESIUM SERPL-MCNC: 2 MG/DL (ref 1.6–2.6)
MAGNESIUM SERPL-MCNC: 2.2 MG/DL (ref 1.6–2.6)
MCH RBC QN AUTO: 28.7 PG (ref 24–34)
MCH RBC QN AUTO: 28.8 PG (ref 24–34)
MCH RBC QN AUTO: 29.1 PG (ref 24–34)
MCH RBC QN AUTO: 29.6 PG (ref 24–34)
MCH RBC QN AUTO: 30.3 PG (ref 24–34)
MCH RBC QN AUTO: 30.4 PG (ref 24–34)
MCH RBC QN AUTO: 30.7 PG (ref 24–34)
MCH RBC QN AUTO: 30.7 PG (ref 24–34)
MCHC RBC AUTO-ENTMCNC: 31.4 G/DL (ref 31–37)
MCHC RBC AUTO-ENTMCNC: 31.6 G/DL (ref 31–37)
MCHC RBC AUTO-ENTMCNC: 31.9 G/DL (ref 31–37)
MCHC RBC AUTO-ENTMCNC: 32.7 G/DL (ref 31–37)
MCHC RBC AUTO-ENTMCNC: 33.1 G/DL (ref 31–37)
MCHC RBC AUTO-ENTMCNC: 33.3 G/DL (ref 31–37)
MCHC RBC AUTO-ENTMCNC: 33.4 G/DL (ref 31–37)
MCHC RBC AUTO-ENTMCNC: 33.4 G/DL (ref 31–37)
MCHC RBC AUTO-ENTMCNC: 34.1 G/DL (ref 31–37)
MCHC RBC AUTO-ENTMCNC: 34.3 G/DL (ref 31–37)
MCV RBC AUTO: 87.2 FL (ref 74–97)
MCV RBC AUTO: 87.9 FL (ref 74–97)
MCV RBC AUTO: 88.6 FL (ref 74–97)
MCV RBC AUTO: 89.3 FL (ref 74–97)
MCV RBC AUTO: 89.9 FL (ref 74–97)
MCV RBC AUTO: 90 FL (ref 74–97)
MCV RBC AUTO: 90.8 FL (ref 74–97)
MCV RBC AUTO: 91.2 FL (ref 74–97)
MCV RBC AUTO: 91.5 FL (ref 74–97)
MCV RBC AUTO: 91.8 FL (ref 74–97)
MONOCYTES # BLD: 0.3 K/UL (ref 0.05–1.2)
MONOCYTES # BLD: 0.4 K/UL (ref 0.05–1.2)
MONOCYTES # BLD: 0.4 K/UL (ref 0.05–1.2)
MONOCYTES # BLD: 0.5 K/UL (ref 0.05–1.2)
MONOCYTES # BLD: 0.6 K/UL (ref 0.05–1.2)
MONOCYTES # BLD: 0.6 K/UL (ref 0–1)
MONOCYTES # BLD: 0.7 K/UL (ref 0.05–1.2)
MONOCYTES # BLD: 0.9 K/UL (ref 0.05–1.2)
MONOCYTES NFR BLD: 10 % (ref 3–10)
MONOCYTES NFR BLD: 11 % (ref 3–10)
MONOCYTES NFR BLD: 5 % (ref 3–10)
MONOCYTES NFR BLD: 6 % (ref 3–10)
MONOCYTES NFR BLD: 6 % (ref 3–10)
MONOCYTES NFR BLD: 7 % (ref 2–9)
MONOCYTES NFR BLD: 9 % (ref 3–10)
MONOCYTES NFR BLD: 9 % (ref 3–10)
MONOCYTES NFR FLD: 0 %
MUCOUS THREADS URNS QL MICRO: ABNORMAL /LPF
NEUTROPHILS NFR FLD: 10 %
NEUTS BAND # FLD: 0 %
NEUTS SEG # BLD: 4 K/UL (ref 1.8–8)
NEUTS SEG # BLD: 4.5 K/UL (ref 1.8–8)
NEUTS SEG # BLD: 4.6 K/UL (ref 1.8–8)
NEUTS SEG # BLD: 4.7 K/UL (ref 1.8–8)
NEUTS SEG # BLD: 4.7 K/UL (ref 1.8–8)
NEUTS SEG # BLD: 5.7 K/UL (ref 1.8–8)
NEUTS SEG # BLD: 6.7 K/UL (ref 1.8–8)
NEUTS SEG # BLD: 6.9 K/UL (ref 1.8–8)
NEUTS SEG NFR BLD: 71 % (ref 40–73)
NEUTS SEG NFR BLD: 74 % (ref 40–73)
NEUTS SEG NFR BLD: 74 % (ref 40–73)
NEUTS SEG NFR BLD: 75 % (ref 40–73)
NEUTS SEG NFR BLD: 76 % (ref 40–73)
NEUTS SEG NFR BLD: 77 % (ref 40–73)
NEUTS SEG NFR BLD: 84 % (ref 42–75)
NEUTS SEG NFR BLD: 88 % (ref 40–73)
NITRITE UR QL STRIP.AUTO: NEGATIVE
NUC CELL # FLD: 120 /CU MM
O2/TOTAL GAS SETTING VFR VENT: 21 %
PCO2 BLD: 31.4 MMHG (ref 35–45)
PH BLD: 7.43 [PH] (ref 7.35–7.45)
PH FLD: 7.8 [PH]
PH UR STRIP: 5 [PH] (ref 5–8)
PH UR STRIP: 6 [PH] (ref 5–8)
PHOSPHATE SERPL-MCNC: 3.6 MG/DL (ref 2.5–4.9)
PHOSPHATE SERPL-MCNC: 3.8 MG/DL (ref 2.5–4.9)
PLATELET # BLD AUTO: 197 K/UL (ref 135–420)
PLATELET # BLD AUTO: 203 K/UL (ref 135–420)
PLATELET # BLD AUTO: 210 K/UL (ref 135–420)
PLATELET # BLD AUTO: 225 K/UL (ref 135–420)
PLATELET # BLD AUTO: 245 K/UL (ref 135–420)
PLATELET # BLD AUTO: 259 K/UL (ref 135–420)
PLATELET # BLD AUTO: 262 K/UL (ref 135–420)
PLATELET # BLD AUTO: 265 K/UL (ref 135–420)
PLATELET # BLD AUTO: 266 K/UL (ref 135–420)
PLATELET # BLD AUTO: 279 K/UL (ref 135–420)
PLATELET COMMENTS,PCOM: ABNORMAL
PMV BLD AUTO: 10 FL (ref 9.2–11.8)
PMV BLD AUTO: 10 FL (ref 9.2–11.8)
PMV BLD AUTO: 10.3 FL (ref 9.2–11.8)
PMV BLD AUTO: 10.4 FL (ref 9.2–11.8)
PMV BLD AUTO: 10.5 FL (ref 9.2–11.8)
PMV BLD AUTO: 10.7 FL (ref 9.2–11.8)
PMV BLD AUTO: 10.8 FL (ref 9.2–11.8)
PMV BLD AUTO: 9.9 FL (ref 9.2–11.8)
PO2 BLD: 77 MMHG (ref 80–100)
POTASSIUM SERPL-SCNC: 2.7 MMOL/L (ref 3.5–5.5)
POTASSIUM SERPL-SCNC: 2.7 MMOL/L (ref 3.5–5.5)
POTASSIUM SERPL-SCNC: 2.9 MMOL/L (ref 3.5–5.5)
POTASSIUM SERPL-SCNC: 3.2 MMOL/L (ref 3.5–5.5)
POTASSIUM SERPL-SCNC: 3.5 MMOL/L (ref 3.5–5.5)
POTASSIUM SERPL-SCNC: 3.6 MMOL/L (ref 3.5–5.5)
POTASSIUM SERPL-SCNC: 3.7 MMOL/L (ref 3.5–5.5)
POTASSIUM SERPL-SCNC: 3.8 MMOL/L (ref 3.5–5.5)
POTASSIUM SERPL-SCNC: 4 MMOL/L (ref 3.5–5.5)
POTASSIUM SERPL-SCNC: 4.1 MMOL/L (ref 3.5–5.5)
POTASSIUM SERPL-SCNC: 4.1 MMOL/L (ref 3.5–5.5)
POTASSIUM SERPL-SCNC: 4.5 MMOL/L (ref 3.5–5.5)
POTASSIUM SERPL-SCNC: 5 MMOL/L (ref 3.5–5.5)
PROT FLD-MCNC: 1.8 G/DL
PROT SERPL-MCNC: 6.5 G/DL (ref 6.4–8.2)
PROT SERPL-MCNC: 6.8 G/DL (ref 6.4–8.2)
PROT SERPL-MCNC: 6.8 G/DL (ref 6.4–8.2)
PROT UR STRIP-MCNC: 100 MG/DL
PROT UR STRIP-MCNC: ABNORMAL MG/DL
PROT UR STRIP-MCNC: ABNORMAL MG/DL
PROT UR STRIP-MCNC: NEGATIVE MG/DL
PROTHROMBIN TIME: 18.6 SEC (ref 11.5–15.2)
PROTHROMBIN TIME: 20.1 SEC (ref 11.5–15.2)
Q-T INTERVAL, ECG07: 312 MS
QRS DURATION, ECG06: 90 MS
QTC CALCULATION (BEZET), ECG08: 390 MS
RBC # BLD AUTO: 3.26 M/UL (ref 4.2–5.3)
RBC # BLD AUTO: 3.29 M/UL (ref 4.2–5.3)
RBC # BLD AUTO: 3.36 M/UL (ref 4.2–5.3)
RBC # BLD AUTO: 3.4 M/UL (ref 4.2–5.3)
RBC # BLD AUTO: 3.54 M/UL (ref 4.2–5.3)
RBC # BLD AUTO: 3.58 M/UL (ref 4.2–5.3)
RBC # BLD AUTO: 3.66 M/UL (ref 4.2–5.3)
RBC # BLD AUTO: 3.68 M/UL (ref 4.2–5.3)
RBC # BLD AUTO: 3.72 M/UL (ref 4.2–5.3)
RBC # BLD AUTO: 3.78 M/UL (ref 4.2–5.3)
RBC # FLD: 30 /CU MM
RBC #/AREA URNS HPF: ABNORMAL /HPF (ref 0–5)
RBC MORPH BLD: ABNORMAL
REPORTED DOSE,DOSE: ABNORMAL UNITS
REPORTED DOSE/TIME,TMG: 1700
SAO2 % BLD: 96 % (ref 92–97)
SERVICE CMNT-IMP: ABNORMAL
SERVICE CMNT-IMP: NORMAL
SODIUM SERPL-SCNC: 133 MMOL/L (ref 136–145)
SODIUM SERPL-SCNC: 140 MMOL/L (ref 136–145)
SODIUM SERPL-SCNC: 141 MMOL/L (ref 136–145)
SODIUM SERPL-SCNC: 141 MMOL/L (ref 136–145)
SODIUM SERPL-SCNC: 142 MMOL/L (ref 136–145)
SODIUM SERPL-SCNC: 143 MMOL/L (ref 136–145)
SODIUM SERPL-SCNC: 143 MMOL/L (ref 136–145)
SP GR UR REFRACTOMETRY: 1.02 (ref 1–1.03)
SPECIMEN SOURCE FLD: ABNORMAL
SPECIMEN SOURCE FLD: NORMAL
SPECIMEN TYPE: ABNORMAL
TROPONIN I SERPL-MCNC: 0.03 NG/ML (ref 0–0.04)
TROPONIN I SERPL-MCNC: 0.09 NG/ML (ref 0–0.04)
TROPONIN I SERPL-MCNC: 0.11 NG/ML (ref 0–0.04)
TROPONIN I SERPL-MCNC: <0.02 NG/ML (ref 0–0.04)
TSH SERPL DL<=0.05 MIU/L-ACNC: 1.38 UIU/ML (ref 0.36–3.74)
TSH SERPL DL<=0.05 MIU/L-ACNC: 2 UIU/ML (ref 0.36–3.74)
UROBILINOGEN UR QL STRIP.AUTO: 1 EU/DL (ref 0.2–1)
VANCOMYCIN SERPL-MCNC: 12.1 UG/ML (ref 5–40)
VANCOMYCIN SERPL-MCNC: 15.8 UG/ML (ref 5–40)
VANCOMYCIN SERPL-MCNC: 20 UG/ML (ref 5–40)
VANCOMYCIN TROUGH SERPL-MCNC: 25.1 UG/ML (ref 10–20)
VENTRICULAR RATE, ECG03: 94 BPM
VIT B12 SERPL-MCNC: 684 PG/ML (ref 211–911)
WBC # BLD AUTO: 5.3 K/UL (ref 4.6–13.2)
WBC # BLD AUTO: 5.9 K/UL (ref 4.6–13.2)
WBC # BLD AUTO: 6 K/UL (ref 4.6–13.2)
WBC # BLD AUTO: 6.1 K/UL (ref 4.6–13.2)
WBC # BLD AUTO: 6.6 K/UL (ref 4.6–13.2)
WBC # BLD AUTO: 7.6 K/UL (ref 4.6–13.2)
WBC # BLD AUTO: 7.6 K/UL (ref 4.6–13.2)
WBC # BLD AUTO: 7.7 K/UL (ref 4.6–13.2)
WBC # BLD AUTO: 7.7 K/UL (ref 4.6–13.2)
WBC # BLD AUTO: 8.2 K/UL (ref 4.6–13.2)
WBC MORPH BLD: ABNORMAL
WBC URNS QL MICRO: ABNORMAL /HPF (ref 0–4)

## 2018-01-01 PROCEDURE — 3331090001 HH PPS REVENUE CREDIT

## 2018-01-01 PROCEDURE — 3331090002 HH PPS REVENUE DEBIT

## 2018-01-01 PROCEDURE — 82962 GLUCOSE BLOOD TEST: CPT

## 2018-01-01 PROCEDURE — 80053 COMPREHEN METABOLIC PANEL: CPT | Performed by: EMERGENCY MEDICINE

## 2018-01-01 PROCEDURE — 77030020186 HC BOOT HL PROTCT SAGE -B

## 2018-01-01 PROCEDURE — 80048 BASIC METABOLIC PNL TOTAL CA: CPT | Performed by: INTERNAL MEDICINE

## 2018-01-01 PROCEDURE — 83880 ASSAY OF NATRIURETIC PEPTIDE: CPT | Performed by: INTERNAL MEDICINE

## 2018-01-01 PROCEDURE — 80202 ASSAY OF VANCOMYCIN: CPT | Performed by: HOSPITALIST

## 2018-01-01 PROCEDURE — 74011636637 HC RX REV CODE- 636/637: Performed by: INTERNAL MEDICINE

## 2018-01-01 PROCEDURE — 74011000258 HC RX REV CODE- 258: Performed by: EMERGENCY MEDICINE

## 2018-01-01 PROCEDURE — 77030011256 HC DRSG MEPILEX <16IN NO BORD MOLN -A

## 2018-01-01 PROCEDURE — T4524 ADULT SIZE BRIEF/DIAPER XL: HCPCS

## 2018-01-01 PROCEDURE — 74011250636 HC RX REV CODE- 250/636: Performed by: EMERGENCY MEDICINE

## 2018-01-01 PROCEDURE — 80048 BASIC METABOLIC PNL TOTAL CA: CPT | Performed by: HOSPITALIST

## 2018-01-01 PROCEDURE — 74011636637 HC RX REV CODE- 636/637: Performed by: HOSPITALIST

## 2018-01-01 PROCEDURE — 82140 ASSAY OF AMMONIA: CPT | Performed by: HOSPITALIST

## 2018-01-01 PROCEDURE — 36600 WITHDRAWAL OF ARTERIAL BLOOD: CPT

## 2018-01-01 PROCEDURE — C1729 CATH, DRAINAGE: HCPCS

## 2018-01-01 PROCEDURE — 85025 COMPLETE CBC W/AUTO DIFF WBC: CPT | Performed by: HOSPITALIST

## 2018-01-01 PROCEDURE — 81001 URINALYSIS AUTO W/SCOPE: CPT | Performed by: FAMILY MEDICINE

## 2018-01-01 PROCEDURE — 74011000250 HC RX REV CODE- 250: Performed by: INTERNAL MEDICINE

## 2018-01-01 PROCEDURE — 77030038269 HC DRN EXT URIN PURWCK BARD -A

## 2018-01-01 PROCEDURE — A9270 NON-COVERED ITEM OR SERVICE: HCPCS

## 2018-01-01 PROCEDURE — 74011000258 HC RX REV CODE- 258: Performed by: INTERNAL MEDICINE

## 2018-01-01 PROCEDURE — 74011250637 HC RX REV CODE- 250/637: Performed by: PHYSICIAN ASSISTANT

## 2018-01-01 PROCEDURE — G0157 HHC PT ASSISTANT EA 15: HCPCS

## 2018-01-01 PROCEDURE — 81001 URINALYSIS AUTO W/SCOPE: CPT | Performed by: EMERGENCY MEDICINE

## 2018-01-01 PROCEDURE — 3336500001 HSPC ELECTION

## 2018-01-01 PROCEDURE — 94640 AIRWAY INHALATION TREATMENT: CPT

## 2018-01-01 PROCEDURE — 74011000255 HC RX REV CODE- 255: Performed by: HOSPITALIST

## 2018-01-01 PROCEDURE — 83735 ASSAY OF MAGNESIUM: CPT | Performed by: INTERNAL MEDICINE

## 2018-01-01 PROCEDURE — 83986 ASSAY PH BODY FLUID NOS: CPT | Performed by: INTERNAL MEDICINE

## 2018-01-01 PROCEDURE — 74011250637 HC RX REV CODE- 250/637: Performed by: FAMILY MEDICINE

## 2018-01-01 PROCEDURE — 92611 MOTION FLUOROSCOPY/SWALLOW: CPT

## 2018-01-01 PROCEDURE — 74011250636 HC RX REV CODE- 250/636: Performed by: INTERNAL MEDICINE

## 2018-01-01 PROCEDURE — 96361 HYDRATE IV INFUSION ADD-ON: CPT

## 2018-01-01 PROCEDURE — 0651 HSPC ROUTINE HOME CARE

## 2018-01-01 PROCEDURE — 36415 COLL VENOUS BLD VENIPUNCTURE: CPT | Performed by: HOSPITALIST

## 2018-01-01 PROCEDURE — 36415 COLL VENOUS BLD VENIPUNCTURE: CPT | Performed by: INTERNAL MEDICINE

## 2018-01-01 PROCEDURE — G0158 HHC OT ASSISTANT EA 15: HCPCS

## 2018-01-01 PROCEDURE — T4541 LARGE DISPOSABLE UNDERPAD: HCPCS

## 2018-01-01 PROCEDURE — 85730 THROMBOPLASTIN TIME PARTIAL: CPT | Performed by: PHYSICIAN ASSISTANT

## 2018-01-01 PROCEDURE — 97165 OT EVAL LOW COMPLEX 30 MIN: CPT

## 2018-01-01 PROCEDURE — 99285 EMERGENCY DEPT VISIT HI MDM: CPT

## 2018-01-01 PROCEDURE — 92526 ORAL FUNCTION THERAPY: CPT

## 2018-01-01 PROCEDURE — 74011250637 HC RX REV CODE- 250/637: Performed by: INTERNAL MEDICINE

## 2018-01-01 PROCEDURE — 85610 PROTHROMBIN TIME: CPT | Performed by: PHYSICIAN ASSISTANT

## 2018-01-01 PROCEDURE — 84443 ASSAY THYROID STIM HORMONE: CPT | Performed by: INTERNAL MEDICINE

## 2018-01-01 PROCEDURE — 70450 CT HEAD/BRAIN W/O DYE: CPT

## 2018-01-01 PROCEDURE — 83615 LACTATE (LD) (LDH) ENZYME: CPT | Performed by: INTERNAL MEDICINE

## 2018-01-01 PROCEDURE — G0299 HHS/HOSPICE OF RN EA 15 MIN: HCPCS

## 2018-01-01 PROCEDURE — 3331090003 HH PPS REVENUE ADJ

## 2018-01-01 PROCEDURE — G0152 HHCP-SERV OF OT,EA 15 MIN: HCPCS

## 2018-01-01 PROCEDURE — 83036 HEMOGLOBIN GLYCOSYLATED A1C: CPT | Performed by: HOSPITALIST

## 2018-01-01 PROCEDURE — 84100 ASSAY OF PHOSPHORUS: CPT | Performed by: INTERNAL MEDICINE

## 2018-01-01 PROCEDURE — 83605 ASSAY OF LACTIC ACID: CPT

## 2018-01-01 PROCEDURE — 74011250637 HC RX REV CODE- 250/637: Performed by: HOSPITALIST

## 2018-01-01 PROCEDURE — 74011000250 HC RX REV CODE- 250: Performed by: HOSPITALIST

## 2018-01-01 PROCEDURE — 97162 PT EVAL MOD COMPLEX 30 MIN: CPT

## 2018-01-01 PROCEDURE — 85025 COMPLETE CBC W/AUTO DIFF WBC: CPT | Performed by: INTERNAL MEDICINE

## 2018-01-01 PROCEDURE — 65660000000 HC RM CCU STEPDOWN

## 2018-01-01 PROCEDURE — 97161 PT EVAL LOW COMPLEX 20 MIN: CPT

## 2018-01-01 PROCEDURE — 82550 ASSAY OF CK (CPK): CPT | Performed by: EMERGENCY MEDICINE

## 2018-01-01 PROCEDURE — 80053 COMPREHEN METABOLIC PANEL: CPT | Performed by: INTERNAL MEDICINE

## 2018-01-01 PROCEDURE — 87086 URINE CULTURE/COLONY COUNT: CPT | Performed by: FAMILY MEDICINE

## 2018-01-01 PROCEDURE — 87077 CULTURE AEROBIC IDENTIFY: CPT | Performed by: FAMILY MEDICINE

## 2018-01-01 PROCEDURE — 97530 THERAPEUTIC ACTIVITIES: CPT

## 2018-01-01 PROCEDURE — 77030013140 HC MSK NEB VYRM -A

## 2018-01-01 PROCEDURE — G0155 HHCP-SVS OF CSW,EA 15 MIN: HCPCS

## 2018-01-01 PROCEDURE — 83735 ASSAY OF MAGNESIUM: CPT | Performed by: HOSPITALIST

## 2018-01-01 PROCEDURE — 87070 CULTURE OTHR SPECIMN AEROBIC: CPT | Performed by: INTERNAL MEDICINE

## 2018-01-01 PROCEDURE — 71046 X-RAY EXAM CHEST 2 VIEWS: CPT

## 2018-01-01 PROCEDURE — 85025 COMPLETE CBC W/AUTO DIFF WBC: CPT | Performed by: EMERGENCY MEDICINE

## 2018-01-01 PROCEDURE — 65660000004 HC RM CVT STEPDOWN

## 2018-01-01 PROCEDURE — 74011250637 HC RX REV CODE- 250/637: Performed by: EMERGENCY MEDICINE

## 2018-01-01 PROCEDURE — 87040 BLOOD CULTURE FOR BACTERIA: CPT | Performed by: EMERGENCY MEDICINE

## 2018-01-01 PROCEDURE — 84100 ASSAY OF PHOSPHORUS: CPT | Performed by: HOSPITALIST

## 2018-01-01 PROCEDURE — 84484 ASSAY OF TROPONIN QUANT: CPT | Performed by: HOSPITALIST

## 2018-01-01 PROCEDURE — 71045 X-RAY EXAM CHEST 1 VIEW: CPT

## 2018-01-01 PROCEDURE — 83735 ASSAY OF MAGNESIUM: CPT | Performed by: EMERGENCY MEDICINE

## 2018-01-01 PROCEDURE — 82803 BLOOD GASES ANY COMBINATION: CPT

## 2018-01-01 PROCEDURE — G0151 HHCP-SERV OF PT,EA 15 MIN: HCPCS

## 2018-01-01 PROCEDURE — 400013 HH SOC

## 2018-01-01 PROCEDURE — 74011250636 HC RX REV CODE- 250/636: Performed by: HOSPITALIST

## 2018-01-01 PROCEDURE — 96365 THER/PROPH/DIAG IV INF INIT: CPT

## 2018-01-01 PROCEDURE — 96376 TX/PRO/DX INJ SAME DRUG ADON: CPT

## 2018-01-01 PROCEDURE — A6250 SKIN SEAL PROTECT MOISTURIZR: HCPCS

## 2018-01-01 PROCEDURE — G0156 HHCP-SVS OF AIDE,EA 15 MIN: HCPCS

## 2018-01-01 PROCEDURE — 97110 THERAPEUTIC EXERCISES: CPT

## 2018-01-01 PROCEDURE — 74011000258 HC RX REV CODE- 258: Performed by: HOSPITALIST

## 2018-01-01 PROCEDURE — 84157 ASSAY OF PROTEIN OTHER: CPT | Performed by: INTERNAL MEDICINE

## 2018-01-01 PROCEDURE — 77030037878 HC DRSG MEPILEX >48IN BORD MOLN -B

## 2018-01-01 PROCEDURE — 85027 COMPLETE CBC AUTOMATED: CPT | Performed by: INTERNAL MEDICINE

## 2018-01-01 PROCEDURE — 82945 GLUCOSE OTHER FLUID: CPT | Performed by: INTERNAL MEDICINE

## 2018-01-01 PROCEDURE — 93306 TTE W/DOPPLER COMPLETE: CPT

## 2018-01-01 PROCEDURE — 87116 MYCOBACTERIA CULTURE: CPT | Performed by: INTERNAL MEDICINE

## 2018-01-01 PROCEDURE — 96367 TX/PROPH/DG ADDL SEQ IV INF: CPT

## 2018-01-01 PROCEDURE — 88112 CYTOPATH CELL ENHANCE TECH: CPT | Performed by: INTERNAL MEDICINE

## 2018-01-01 PROCEDURE — 0W993ZZ DRAINAGE OF RIGHT PLEURAL CAVITY, PERCUTANEOUS APPROACH: ICD-10-PCS | Performed by: RADIOLOGY

## 2018-01-01 PROCEDURE — 80202 ASSAY OF VANCOMYCIN: CPT | Performed by: INTERNAL MEDICINE

## 2018-01-01 PROCEDURE — 96374 THER/PROPH/DIAG INJ IV PUSH: CPT

## 2018-01-01 PROCEDURE — 74011000250 HC RX REV CODE- 250: Performed by: EMERGENCY MEDICINE

## 2018-01-01 PROCEDURE — 97535 SELF CARE MNGMENT TRAINING: CPT

## 2018-01-01 PROCEDURE — 88305 TISSUE EXAM BY PATHOLOGIST: CPT | Performed by: INTERNAL MEDICINE

## 2018-01-01 PROCEDURE — 92610 EVALUATE SWALLOWING FUNCTION: CPT

## 2018-01-01 PROCEDURE — 89051 BODY FLUID CELL COUNT: CPT | Performed by: INTERNAL MEDICINE

## 2018-01-01 PROCEDURE — HOSPICE MEDICATION HC HH HOSPICE MEDICATION

## 2018-01-01 PROCEDURE — 83880 ASSAY OF NATRIURETIC PEPTIDE: CPT | Performed by: EMERGENCY MEDICINE

## 2018-01-01 PROCEDURE — 74230 X-RAY XM SWLNG FUNCJ C+: CPT

## 2018-01-01 PROCEDURE — 96375 TX/PRO/DX INJ NEW DRUG ADDON: CPT

## 2018-01-01 PROCEDURE — 82550 ASSAY OF CK (CPK): CPT | Performed by: INTERNAL MEDICINE

## 2018-01-01 PROCEDURE — 99218 HC RM OBSERVATION: CPT

## 2018-01-01 PROCEDURE — 77010033678 HC OXYGEN DAILY

## 2018-01-01 PROCEDURE — 87186 SC STD MICRODIL/AGAR DIL: CPT | Performed by: FAMILY MEDICINE

## 2018-01-01 PROCEDURE — 84443 ASSAY THYROID STIM HORMONE: CPT | Performed by: EMERGENCY MEDICINE

## 2018-01-01 PROCEDURE — 93005 ELECTROCARDIOGRAM TRACING: CPT

## 2018-01-01 PROCEDURE — 83036 HEMOGLOBIN GLYCOSYLATED A1C: CPT | Performed by: INTERNAL MEDICINE

## 2018-01-01 PROCEDURE — 96366 THER/PROPH/DIAG IV INF ADDON: CPT

## 2018-01-01 PROCEDURE — 82607 VITAMIN B-12: CPT | Performed by: HOSPITALIST

## 2018-01-01 PROCEDURE — 97166 OT EVAL MOD COMPLEX 45 MIN: CPT

## 2018-01-01 PROCEDURE — 77030037875 HC DRSG MEPILEX <16IN BORD MOLN -A

## 2018-01-01 RX ORDER — LANOLIN ALCOHOL/MO/W.PET/CERES
400 CREAM (GRAM) TOPICAL DAILY
Status: DISCONTINUED | OUTPATIENT
Start: 2018-01-01 | End: 2018-01-01 | Stop reason: HOSPADM

## 2018-01-01 RX ORDER — INSULIN LISPRO 100 [IU]/ML
INJECTION, SOLUTION INTRAVENOUS; SUBCUTANEOUS
Qty: 1 VIAL | Refills: 1 | Status: SHIPPED | OUTPATIENT
Start: 2018-01-01

## 2018-01-01 RX ORDER — RISPERIDONE 1 MG/1
1 TABLET, FILM COATED ORAL
Qty: 30 TAB | Refills: 0 | Status: SHIPPED | OUTPATIENT
Start: 2018-01-01

## 2018-01-01 RX ORDER — PREDNISOLONE ACETATE 10 MG/ML
1 SUSPENSION/ DROPS OPHTHALMIC 2 TIMES DAILY
Qty: 5 ML | Refills: 0 | Status: SHIPPED | OUTPATIENT
Start: 2018-01-01

## 2018-01-01 RX ORDER — AMIODARONE HYDROCHLORIDE 200 MG/1
200 TABLET ORAL DAILY
Status: DISCONTINUED | OUTPATIENT
Start: 2018-01-01 | End: 2018-01-01

## 2018-01-01 RX ORDER — METOPROLOL TARTRATE 25 MG/1
25 TABLET, FILM COATED ORAL EVERY 12 HOURS
Status: DISCONTINUED | OUTPATIENT
Start: 2018-01-01 | End: 2018-01-01

## 2018-01-01 RX ORDER — ALBUTEROL SULFATE 90 UG/1
1 AEROSOL, METERED RESPIRATORY (INHALATION)
Qty: 1 INHALER | Refills: 0 | Status: ON HOLD | OUTPATIENT
Start: 2018-01-01 | End: 2018-01-01

## 2018-01-01 RX ORDER — PREDNISONE 20 MG/1
20 TABLET ORAL DAILY
Qty: 5 TAB | Refills: 0 | Status: SHIPPED
Start: 2018-01-01 | End: 2018-01-01

## 2018-01-01 RX ORDER — SODIUM CHLORIDE 9 MG/ML
75 INJECTION, SOLUTION INTRAVENOUS CONTINUOUS
Status: DISCONTINUED | OUTPATIENT
Start: 2018-01-01 | End: 2018-01-01

## 2018-01-01 RX ORDER — DEXTROSE 50 % IN WATER (D50W) INTRAVENOUS SYRINGE
25-50 AS NEEDED
Status: DISCONTINUED | OUTPATIENT
Start: 2018-01-01 | End: 2018-01-01 | Stop reason: HOSPADM

## 2018-01-01 RX ORDER — MAGNESIUM SULFATE 100 %
16 CRYSTALS MISCELLANEOUS AS NEEDED
Status: DISCONTINUED | OUTPATIENT
Start: 2018-01-01 | End: 2018-01-01 | Stop reason: HOSPADM

## 2018-01-01 RX ORDER — QUETIAPINE FUMARATE 25 MG/1
12.5 TABLET, FILM COATED ORAL 2 TIMES DAILY
Status: DISCONTINUED | OUTPATIENT
Start: 2018-01-01 | End: 2018-01-01

## 2018-01-01 RX ORDER — OXYCODONE AND ACETAMINOPHEN 5; 325 MG/1; MG/1
1 TABLET ORAL
Status: DISCONTINUED | OUTPATIENT
Start: 2018-01-01 | End: 2018-01-01

## 2018-01-01 RX ORDER — FUROSEMIDE 40 MG/1
40 TABLET ORAL DAILY
Status: DISCONTINUED | OUTPATIENT
Start: 2018-01-01 | End: 2018-01-01

## 2018-01-01 RX ORDER — FLUOXETINE 20 MG/1
40 TABLET ORAL
Qty: 60 TAB | Refills: 0 | Status: SHIPPED | OUTPATIENT
Start: 2018-01-01

## 2018-01-01 RX ORDER — ACETAMINOPHEN 500 MG
500 TABLET ORAL
Status: DISCONTINUED | OUTPATIENT
Start: 2018-01-01 | End: 2018-01-01 | Stop reason: HOSPADM

## 2018-01-01 RX ORDER — INSULIN LISPRO 100 [IU]/ML
INJECTION, SOLUTION INTRAVENOUS; SUBCUTANEOUS
Status: DISCONTINUED | OUTPATIENT
Start: 2018-01-01 | End: 2018-01-01 | Stop reason: HOSPADM

## 2018-01-01 RX ORDER — POLYETHYLENE GLYCOL 3350 17 G/17G
17 POWDER, FOR SOLUTION ORAL
Qty: 510 G | Refills: 0 | Status: ON HOLD | OUTPATIENT
Start: 2018-01-01 | End: 2018-01-01

## 2018-01-01 RX ORDER — AMIODARONE HYDROCHLORIDE 200 MG/1
200 TABLET ORAL DAILY
Qty: 30 TAB | Refills: 0 | Status: SHIPPED
Start: 2018-01-01 | End: 2018-01-01

## 2018-01-01 RX ORDER — METOPROLOL TARTRATE 25 MG/1
12.5 TABLET, FILM COATED ORAL EVERY 12 HOURS
Qty: 30 TAB | Refills: 2 | Status: SHIPPED | OUTPATIENT
Start: 2018-01-01

## 2018-01-01 RX ORDER — FAMOTIDINE 20 MG/1
20 TABLET, FILM COATED ORAL
Qty: 15 TAB | Refills: 0 | Status: SHIPPED
Start: 2018-01-01 | End: 2018-01-01

## 2018-01-01 RX ORDER — INSULIN GLARGINE 100 [IU]/ML
4 INJECTION, SOLUTION SUBCUTANEOUS
Status: DISCONTINUED | OUTPATIENT
Start: 2018-01-01 | End: 2018-01-01

## 2018-01-01 RX ORDER — ACETAMINOPHEN 325 MG/1
650 TABLET ORAL ONCE
Status: COMPLETED | OUTPATIENT
Start: 2018-01-01 | End: 2018-01-01

## 2018-01-01 RX ORDER — ASPIRIN 81 MG/1
81 TABLET ORAL DAILY
Status: DISCONTINUED | OUTPATIENT
Start: 2018-01-01 | End: 2018-01-01 | Stop reason: HOSPADM

## 2018-01-01 RX ORDER — METOPROLOL TARTRATE 25 MG/1
12.5 TABLET, FILM COATED ORAL EVERY 12 HOURS
Status: DISCONTINUED | OUTPATIENT
Start: 2018-01-01 | End: 2018-01-01 | Stop reason: HOSPADM

## 2018-01-01 RX ORDER — INSULIN LISPRO 100 [IU]/ML
INJECTION, SOLUTION INTRAVENOUS; SUBCUTANEOUS
Qty: 1 VIAL | Refills: 0 | Status: SHIPPED
Start: 2018-01-01 | End: 2018-01-01

## 2018-01-01 RX ORDER — AMIODARONE HYDROCHLORIDE 200 MG/1
200 TABLET ORAL DAILY
Qty: 30 TAB | Refills: 2 | Status: SHIPPED | OUTPATIENT
Start: 2018-01-01

## 2018-01-01 RX ORDER — METOPROLOL TARTRATE 25 MG/1
25 TABLET, FILM COATED ORAL EVERY 12 HOURS
Status: DISCONTINUED | OUTPATIENT
Start: 2018-01-01 | End: 2018-01-01 | Stop reason: HOSPADM

## 2018-01-01 RX ORDER — DEXTROSE 50 % IN WATER (D50W) INTRAVENOUS SYRINGE
12.5
Status: COMPLETED | OUTPATIENT
Start: 2018-01-01 | End: 2018-01-01

## 2018-01-01 RX ORDER — FLUOXETINE HYDROCHLORIDE 20 MG/1
40 CAPSULE ORAL
Status: DISCONTINUED | OUTPATIENT
Start: 2018-01-01 | End: 2018-01-01 | Stop reason: HOSPADM

## 2018-01-01 RX ORDER — AMIODARONE HYDROCHLORIDE 200 MG/1
200 TABLET ORAL DAILY
Status: DISCONTINUED | OUTPATIENT
Start: 2018-01-01 | End: 2018-01-01 | Stop reason: HOSPADM

## 2018-01-01 RX ORDER — NALOXONE HYDROCHLORIDE 0.4 MG/ML
0.4 INJECTION, SOLUTION INTRAMUSCULAR; INTRAVENOUS; SUBCUTANEOUS AS NEEDED
Status: DISCONTINUED | OUTPATIENT
Start: 2018-01-01 | End: 2018-01-01 | Stop reason: HOSPADM

## 2018-01-01 RX ORDER — QUETIAPINE FUMARATE 25 MG/1
12.5 TABLET, FILM COATED ORAL 2 TIMES DAILY
Qty: 30 TAB | Refills: 0 | Status: SHIPPED | OUTPATIENT
Start: 2018-01-01

## 2018-01-01 RX ORDER — PREDNISONE 20 MG/1
20 TABLET ORAL DAILY
Qty: 5 TAB | Refills: 0 | Status: SHIPPED | OUTPATIENT
Start: 2018-01-01 | End: 2018-01-01

## 2018-01-01 RX ORDER — PREDNISOLONE ACETATE 10 MG/ML
1 SUSPENSION/ DROPS OPHTHALMIC 2 TIMES DAILY
Status: DISCONTINUED | OUTPATIENT
Start: 2018-01-01 | End: 2018-01-01 | Stop reason: HOSPADM

## 2018-01-01 RX ORDER — DOCUSATE SODIUM 100 MG/1
100 CAPSULE, LIQUID FILLED ORAL
Status: DISCONTINUED | OUTPATIENT
Start: 2018-01-01 | End: 2018-01-01 | Stop reason: HOSPADM

## 2018-01-01 RX ORDER — INSULIN GLARGINE 100 [IU]/ML
18 INJECTION, SOLUTION SUBCUTANEOUS
Qty: 1 VIAL | Refills: 2 | Status: SHIPPED
Start: 2018-01-01 | End: 2018-01-01

## 2018-01-01 RX ORDER — FUROSEMIDE 10 MG/ML
20 INJECTION INTRAMUSCULAR; INTRAVENOUS EVERY 12 HOURS
Status: DISCONTINUED | OUTPATIENT
Start: 2018-01-01 | End: 2018-01-01

## 2018-01-01 RX ORDER — FLUOXETINE HYDROCHLORIDE 20 MG/1
40 CAPSULE ORAL DAILY
Status: DISCONTINUED | OUTPATIENT
Start: 2018-01-01 | End: 2018-01-01

## 2018-01-01 RX ORDER — RISPERIDONE 1 MG/1
1 TABLET, FILM COATED ORAL
Qty: 30 TAB | Refills: 0 | Status: SHIPPED | OUTPATIENT
Start: 2018-01-01 | End: 2018-01-01

## 2018-01-01 RX ORDER — MAGNESIUM SULFATE HEPTAHYDRATE 40 MG/ML
4 INJECTION, SOLUTION INTRAVENOUS
Status: COMPLETED | OUTPATIENT
Start: 2018-01-01 | End: 2018-01-01

## 2018-01-01 RX ORDER — DOXYCYCLINE 100 MG/1
100 CAPSULE ORAL EVERY 12 HOURS
Status: DISCONTINUED | OUTPATIENT
Start: 2018-01-01 | End: 2018-01-01 | Stop reason: HOSPADM

## 2018-01-01 RX ORDER — LORAZEPAM 2 MG/ML
1 CONCENTRATE ORAL
Qty: 30 ML | Refills: 0 | Status: SHIPPED | OUTPATIENT
Start: 2018-01-01

## 2018-01-01 RX ORDER — ACETAMINOPHEN 325 MG/1
650 TABLET ORAL
Qty: 30 TAB | Refills: 0 | Status: SHIPPED | OUTPATIENT
Start: 2018-01-01

## 2018-01-01 RX ORDER — LOSARTAN POTASSIUM 25 MG/1
25 TABLET ORAL DAILY
Qty: 30 TAB | Refills: 0 | Status: SHIPPED | OUTPATIENT
Start: 2018-01-01 | End: 2018-01-01

## 2018-01-01 RX ORDER — ALBUTEROL SULFATE 0.83 MG/ML
2.5 SOLUTION RESPIRATORY (INHALATION)
Qty: 24 EACH | Refills: 0 | Status: SHIPPED
Start: 2018-01-01 | End: 2018-01-01

## 2018-01-01 RX ORDER — ALBUTEROL SULFATE 0.83 MG/ML
2.5 SOLUTION RESPIRATORY (INHALATION)
Qty: 24 EACH | Refills: 0 | Status: SHIPPED | OUTPATIENT
Start: 2018-01-01

## 2018-01-01 RX ORDER — ASPIRIN 81 MG/1
81 TABLET ORAL DAILY
Qty: 30 TAB | Refills: 2 | Status: SHIPPED | OUTPATIENT
Start: 2018-01-01

## 2018-01-01 RX ORDER — AMIODARONE HYDROCHLORIDE 200 MG/1
200 TABLET ORAL DAILY
Qty: 30 TAB | Refills: 0 | Status: ON HOLD | OUTPATIENT
Start: 2018-01-01 | End: 2018-01-01

## 2018-01-01 RX ORDER — FAMOTIDINE 20 MG/1
20 TABLET, FILM COATED ORAL EVERY 24 HOURS
Status: DISCONTINUED | OUTPATIENT
Start: 2018-01-01 | End: 2018-01-01 | Stop reason: HOSPADM

## 2018-01-01 RX ORDER — SODIUM CHLORIDE 900 MG/100ML
INJECTION INTRAVENOUS
Status: DISPENSED
Start: 2018-01-01 | End: 2018-01-01

## 2018-01-01 RX ORDER — METOPROLOL TARTRATE 25 MG/1
25 TABLET, FILM COATED ORAL EVERY 12 HOURS
Qty: 30 TAB | Refills: 0 | Status: SHIPPED
Start: 2018-01-01 | End: 2018-01-01

## 2018-01-01 RX ORDER — POTASSIUM CHLORIDE 1.5 G/1.77G
40 POWDER, FOR SOLUTION ORAL ONCE
Status: COMPLETED | OUTPATIENT
Start: 2018-01-01 | End: 2018-01-01

## 2018-01-01 RX ORDER — POLYETHYLENE GLYCOL 3350 17 G/17G
17 POWDER, FOR SOLUTION ORAL
Qty: 238 G | Refills: 0 | Status: SHIPPED | OUTPATIENT
Start: 2018-01-01

## 2018-01-01 RX ORDER — LOSARTAN POTASSIUM 50 MG/1
50 TABLET ORAL DAILY
Status: DISCONTINUED | OUTPATIENT
Start: 2018-01-01 | End: 2018-01-01

## 2018-01-01 RX ORDER — MAGNESIUM SULFATE 100 %
4 CRYSTALS MISCELLANEOUS AS NEEDED
Status: DISCONTINUED | OUTPATIENT
Start: 2018-01-01 | End: 2018-01-01 | Stop reason: HOSPADM

## 2018-01-01 RX ORDER — MONTELUKAST SODIUM 10 MG/1
10 TABLET ORAL DAILY
Qty: 30 TAB | Refills: 0 | Status: SHIPPED | OUTPATIENT
Start: 2018-01-01

## 2018-01-01 RX ORDER — METFORMIN HYDROCHLORIDE 1000 MG/1
1000 TABLET ORAL 2 TIMES DAILY WITH MEALS
Qty: 60 TAB | Refills: 0 | Status: SHIPPED | OUTPATIENT
Start: 2018-01-01 | End: 2018-01-01

## 2018-01-01 RX ORDER — INSULIN LISPRO 100 [IU]/ML
INJECTION, SOLUTION INTRAVENOUS; SUBCUTANEOUS
Qty: 1 VIAL | Refills: 0 | Status: ON HOLD | OUTPATIENT
Start: 2018-01-01 | End: 2018-01-01

## 2018-01-01 RX ORDER — ACETAMINOPHEN 325 MG/1
650 TABLET ORAL
Qty: 30 TAB | Refills: 0 | Status: ON HOLD | OUTPATIENT
Start: 2018-01-01 | End: 2018-01-01

## 2018-01-01 RX ORDER — FLUOXETINE 20 MG/1
40 TABLET ORAL
Qty: 30 TAB | Refills: 0 | Status: ON HOLD | OUTPATIENT
Start: 2018-01-01 | End: 2018-01-01

## 2018-01-01 RX ORDER — LOSARTAN POTASSIUM 25 MG/1
25 TABLET ORAL DAILY
Status: DISCONTINUED | OUTPATIENT
Start: 2018-01-01 | End: 2018-01-01

## 2018-01-01 RX ORDER — DILTIAZEM HYDROCHLORIDE 30 MG/1
30 TABLET, FILM COATED ORAL
Status: DISCONTINUED | OUTPATIENT
Start: 2018-01-01 | End: 2018-01-01

## 2018-01-01 RX ORDER — ONDANSETRON 2 MG/ML
4 INJECTION INTRAMUSCULAR; INTRAVENOUS
Status: DISCONTINUED | OUTPATIENT
Start: 2018-01-01 | End: 2018-01-01 | Stop reason: HOSPADM

## 2018-01-01 RX ORDER — FLUOXETINE HYDROCHLORIDE 20 MG/1
40 CAPSULE ORAL
Status: DISCONTINUED | OUTPATIENT
Start: 2018-01-01 | End: 2018-01-01

## 2018-01-01 RX ORDER — RISPERIDONE 0.25 MG/1
1 TABLET, FILM COATED ORAL
Status: DISCONTINUED | OUTPATIENT
Start: 2018-01-01 | End: 2018-01-01

## 2018-01-01 RX ORDER — QUETIAPINE FUMARATE 25 MG/1
12.5 TABLET, FILM COATED ORAL 2 TIMES DAILY
Qty: 30 TAB | Refills: 0 | Status: ON HOLD | OUTPATIENT
Start: 2018-01-01 | End: 2018-01-01

## 2018-01-01 RX ORDER — INSULIN GLARGINE 100 [IU]/ML
18 INJECTION, SOLUTION SUBCUTANEOUS
Status: DISCONTINUED | OUTPATIENT
Start: 2018-01-01 | End: 2018-01-01 | Stop reason: HOSPADM

## 2018-01-01 RX ORDER — GLUCOSAMINE SULFATE 1500 MG
2000 POWDER IN PACKET (EA) ORAL DAILY
Qty: 30 CAP | Refills: 0 | Status: ON HOLD | OUTPATIENT
Start: 2018-01-01 | End: 2018-01-01

## 2018-01-01 RX ORDER — SIMVASTATIN 10 MG/1
10 TABLET, FILM COATED ORAL
Qty: 30 TAB | Refills: 0 | Status: SHIPPED | OUTPATIENT
Start: 2018-01-01

## 2018-01-01 RX ORDER — ATROPINE SULFATE 10 MG/ML
1 SOLUTION/ DROPS OPHTHALMIC
Qty: 5 ML | Refills: 0 | Status: SHIPPED | OUTPATIENT
Start: 2018-01-01

## 2018-01-01 RX ORDER — INSULIN LISPRO 100 [IU]/ML
INJECTION, SOLUTION INTRAVENOUS; SUBCUTANEOUS
Status: DISCONTINUED | OUTPATIENT
Start: 2018-01-01 | End: 2018-01-01

## 2018-01-01 RX ORDER — ALBUTEROL SULFATE 0.83 MG/ML
2.5 SOLUTION RESPIRATORY (INHALATION)
Status: DISCONTINUED | OUTPATIENT
Start: 2018-01-01 | End: 2018-01-01 | Stop reason: HOSPADM

## 2018-01-01 RX ORDER — SODIUM CHLORIDE 9 MG/ML
50 INJECTION, SOLUTION INTRAVENOUS CONTINUOUS
Status: DISPENSED | OUTPATIENT
Start: 2018-01-01 | End: 2018-01-01

## 2018-01-01 RX ORDER — METOPROLOL TARTRATE 25 MG/1
25 TABLET, FILM COATED ORAL EVERY 12 HOURS
Qty: 30 TAB | Refills: 0 | Status: ON HOLD | OUTPATIENT
Start: 2018-01-01 | End: 2018-01-01

## 2018-01-01 RX ORDER — POTASSIUM CHLORIDE 20 MEQ/1
40 TABLET, EXTENDED RELEASE ORAL 3 TIMES DAILY
Status: COMPLETED | OUTPATIENT
Start: 2018-01-01 | End: 2018-01-01

## 2018-01-01 RX ORDER — FAMOTIDINE 20 MG/1
20 TABLET, FILM COATED ORAL
Qty: 15 TAB | Refills: 0 | Status: ON HOLD | OUTPATIENT
Start: 2018-01-01 | End: 2018-01-01

## 2018-01-01 RX ORDER — DEXTROSE 50 % IN WATER (D50W) INTRAVENOUS SYRINGE
25
Status: COMPLETED | OUTPATIENT
Start: 2018-01-01 | End: 2018-01-01

## 2018-01-01 RX ORDER — RISPERIDONE 1 MG/1
1 TABLET, FILM COATED ORAL
Qty: 30 TAB | Refills: 0 | Status: ON HOLD
Start: 2018-01-01 | End: 2018-01-01

## 2018-01-01 RX ORDER — ACETAMINOPHEN AND CODEINE PHOSPHATE 300; 30 MG/1; MG/1
1 TABLET ORAL
Status: DISCONTINUED | OUTPATIENT
Start: 2018-01-01 | End: 2018-01-01 | Stop reason: HOSPADM

## 2018-01-01 RX ORDER — INSULIN GLARGINE 100 [IU]/ML
18 INJECTION, SOLUTION SUBCUTANEOUS
Qty: 1 VIAL | Refills: 2 | Status: SHIPPED | OUTPATIENT
Start: 2018-01-01

## 2018-01-01 RX ORDER — POLYETHYLENE GLYCOL 3350 17 G/17G
17 POWDER, FOR SOLUTION ORAL
Status: DISCONTINUED | OUTPATIENT
Start: 2018-01-01 | End: 2018-01-01 | Stop reason: HOSPADM

## 2018-01-01 RX ORDER — FUROSEMIDE 20 MG/1
20 TABLET ORAL
Qty: 25 TAB | Refills: 0 | Status: SHIPPED | OUTPATIENT
Start: 2018-01-01

## 2018-01-01 RX ORDER — ALBUTEROL SULFATE 90 UG/1
1 AEROSOL, METERED RESPIRATORY (INHALATION)
Qty: 1 INHALER | Refills: 0 | Status: SHIPPED | OUTPATIENT
Start: 2018-01-01

## 2018-01-01 RX ORDER — MENTHOL AND ZINC OXIDE .44; 20.625 G/100G; G/100G
1 OINTMENT TOPICAL
Qty: 1 TUBE | Refills: 0 | Status: SHIPPED | OUTPATIENT
Start: 2018-01-01 | End: 2018-01-01

## 2018-01-01 RX ORDER — RISPERIDONE 1 MG/1
1 TABLET, FILM COATED ORAL
Status: DISCONTINUED | OUTPATIENT
Start: 2018-01-01 | End: 2018-01-01 | Stop reason: HOSPADM

## 2018-01-01 RX ORDER — ASPIRIN 81 MG/1
81 TABLET ORAL DAILY
Qty: 30 TAB | Refills: 0 | Status: ON HOLD | OUTPATIENT
Start: 2018-01-01 | End: 2018-01-01

## 2018-01-01 RX ORDER — GLIPIZIDE 10 MG/1
10 TABLET, FILM COATED, EXTENDED RELEASE ORAL 2 TIMES DAILY
Qty: 30 TAB | Refills: 0 | Status: SHIPPED | OUTPATIENT
Start: 2018-01-01 | End: 2018-01-01

## 2018-01-01 RX ORDER — FUROSEMIDE 20 MG/1
20 TABLET ORAL
Qty: 30 TAB | Refills: 0 | Status: SHIPPED
Start: 2018-01-01 | End: 2018-01-01

## 2018-01-01 RX ORDER — SODIUM CHLORIDE 0.9 % (FLUSH) 0.9 %
5-10 SYRINGE (ML) INJECTION AS NEEDED
Status: DISCONTINUED | OUTPATIENT
Start: 2018-01-01 | End: 2018-01-01 | Stop reason: HOSPADM

## 2018-01-01 RX ORDER — PREDNISONE 20 MG/1
40 TABLET ORAL
Status: DISCONTINUED | OUTPATIENT
Start: 2018-01-01 | End: 2018-01-01 | Stop reason: HOSPADM

## 2018-01-01 RX ORDER — ALBUTEROL SULFATE 0.83 MG/ML
2.5 SOLUTION RESPIRATORY (INHALATION)
Qty: 24 EACH | Refills: 0 | Status: ON HOLD | OUTPATIENT
Start: 2018-01-01 | End: 2018-01-01

## 2018-01-01 RX ORDER — FLUOXETINE HYDROCHLORIDE 20 MG/1
20 CAPSULE ORAL DAILY
Status: DISCONTINUED | OUTPATIENT
Start: 2018-01-01 | End: 2018-01-01 | Stop reason: HOSPADM

## 2018-01-01 RX ORDER — RISPERIDONE 1 MG/1
1 TABLET, FILM COATED ORAL 2 TIMES DAILY
Status: DISCONTINUED | OUTPATIENT
Start: 2018-01-01 | End: 2018-01-01

## 2018-01-01 RX ORDER — ACETAMINOPHEN 325 MG/1
650 TABLET ORAL
Status: DISCONTINUED | OUTPATIENT
Start: 2018-01-01 | End: 2018-01-01 | Stop reason: HOSPADM

## 2018-01-01 RX ORDER — METOPROLOL TARTRATE 25 MG/1
25 TABLET, FILM COATED ORAL 2 TIMES DAILY
Status: DISCONTINUED | OUTPATIENT
Start: 2018-01-01 | End: 2018-01-01

## 2018-01-01 RX ORDER — SIMVASTATIN 10 MG/1
10 TABLET, FILM COATED ORAL
Status: DISCONTINUED | OUTPATIENT
Start: 2018-01-01 | End: 2018-01-01 | Stop reason: HOSPADM

## 2018-01-01 RX ORDER — FAMOTIDINE 20 MG/1
20 TABLET, FILM COATED ORAL
Status: DISCONTINUED | OUTPATIENT
Start: 2018-01-01 | End: 2018-01-01 | Stop reason: HOSPADM

## 2018-01-01 RX ORDER — INSULIN ASPART 100 [IU]/ML
INJECTION, SOLUTION INTRAVENOUS; SUBCUTANEOUS
Qty: 20 ML | Refills: 0 | Status: SHIPPED | OUTPATIENT
Start: 2018-01-01

## 2018-01-01 RX ORDER — RISPERIDONE 0.5 MG/1
0.5 TABLET, FILM COATED ORAL
Status: DISCONTINUED | OUTPATIENT
Start: 2018-01-01 | End: 2018-01-01 | Stop reason: HOSPADM

## 2018-01-01 RX ORDER — VANCOMYCIN 2 GRAM/500 ML IN 0.9 % SODIUM CHLORIDE INTRAVENOUS
2000 ONCE
Status: COMPLETED | OUTPATIENT
Start: 2018-01-01 | End: 2018-01-01

## 2018-01-01 RX ORDER — FLUOXETINE 20 MG/1
40 TABLET ORAL
Qty: 30 TAB | Refills: 0 | Status: SHIPPED
Start: 2018-01-01 | End: 2018-01-01

## 2018-01-01 RX ORDER — MORPHINE SULFATE 20 MG/ML
10 SOLUTION ORAL
Qty: 15 ML | Refills: 0 | Status: SHIPPED | OUTPATIENT
Start: 2018-01-01

## 2018-01-01 RX ORDER — PREDNISOLONE ACETATE 10 MG/ML
1 SUSPENSION/ DROPS OPHTHALMIC 2 TIMES DAILY
Qty: 5 ML | Refills: 0 | Status: ON HOLD | OUTPATIENT
Start: 2018-01-01 | End: 2018-01-01

## 2018-01-01 RX ORDER — QUETIAPINE FUMARATE 25 MG/1
12.5 TABLET, FILM COATED ORAL
Status: DISCONTINUED | OUTPATIENT
Start: 2018-01-01 | End: 2018-01-01 | Stop reason: HOSPADM

## 2018-01-01 RX ORDER — DOXYCYCLINE 100 MG/1
100 CAPSULE ORAL EVERY 12 HOURS
Qty: 4 CAP | Refills: 0 | Status: SHIPPED | OUTPATIENT
Start: 2018-01-01 | End: 2018-01-01

## 2018-01-01 RX ORDER — FUROSEMIDE 40 MG/1
40 TABLET ORAL DAILY
Qty: 30 TAB | Refills: 0 | Status: SHIPPED
Start: 2018-01-01 | End: 2018-01-01

## 2018-01-01 RX ORDER — FUROSEMIDE 20 MG/1
20 TABLET ORAL
Qty: 30 TAB | Refills: 0 | Status: ON HOLD | OUTPATIENT
Start: 2018-01-01 | End: 2018-01-01

## 2018-01-01 RX ORDER — MELATONIN
2000 DAILY
Status: DISCONTINUED | OUTPATIENT
Start: 2018-01-01 | End: 2018-01-01 | Stop reason: HOSPADM

## 2018-01-01 RX ORDER — LOSARTAN POTASSIUM 25 MG/1
25 TABLET ORAL DAILY
Qty: 30 TAB | Refills: 0 | Status: SHIPPED
Start: 2018-01-01 | End: 2018-01-01

## 2018-01-01 RX ORDER — FAMOTIDINE 20 MG/1
20 TABLET, FILM COATED ORAL
Qty: 30 TAB | Refills: 0 | Status: SHIPPED | OUTPATIENT
Start: 2018-01-01

## 2018-01-01 RX ORDER — POTASSIUM CHLORIDE 7.45 MG/ML
10 INJECTION INTRAVENOUS
Status: COMPLETED | OUTPATIENT
Start: 2018-01-01 | End: 2018-01-01

## 2018-01-01 RX ORDER — MONTELUKAST SODIUM 10 MG/1
10 TABLET ORAL DAILY
Qty: 30 TAB | Refills: 0 | Status: ON HOLD | OUTPATIENT
Start: 2018-01-01 | End: 2018-01-01

## 2018-01-01 RX ORDER — SIMVASTATIN 10 MG/1
10 TABLET, FILM COATED ORAL
Qty: 30 TAB | Refills: 0 | Status: ON HOLD | OUTPATIENT
Start: 2018-01-01 | End: 2018-01-01

## 2018-01-01 RX ORDER — GLUCOSAMINE SULFATE 1500 MG
2000 POWDER IN PACKET (EA) ORAL DAILY
Qty: 60 CAP | Refills: 2 | Status: SHIPPED | OUTPATIENT
Start: 2018-01-01

## 2018-01-01 RX ADMIN — LACTULOSE 20 G: 10 SOLUTION ORAL at 09:18

## 2018-01-01 RX ADMIN — ASPIRIN 81 MG: 81 TABLET, COATED ORAL at 08:34

## 2018-01-01 RX ADMIN — MORPHINE SULFATE 0.25 ML: 20 SOLUTION ORAL at 14:00

## 2018-01-01 RX ADMIN — POTASSIUM CHLORIDE 10 MEQ: 10 INJECTION, SOLUTION INTRAVENOUS at 18:17

## 2018-01-01 RX ADMIN — FAMOTIDINE 20 MG: 20 TABLET ORAL at 21:40

## 2018-01-01 RX ADMIN — RISPERIDONE 1 MG: 1 TABLET ORAL at 08:17

## 2018-01-01 RX ADMIN — METOPROLOL TARTRATE 25 MG: 25 TABLET ORAL at 23:14

## 2018-01-01 RX ADMIN — FLUOXETINE 40 MG: 20 CAPSULE ORAL at 08:14

## 2018-01-01 RX ADMIN — METOPROLOL TARTRATE 25 MG: 25 TABLET ORAL at 09:18

## 2018-01-01 RX ADMIN — QUETIAPINE FUMARATE 12.5 MG: 25 TABLET ORAL at 09:22

## 2018-01-01 RX ADMIN — INSULIN LISPRO 4 UNITS: 100 INJECTION, SOLUTION INTRAVENOUS; SUBCUTANEOUS at 16:30

## 2018-01-01 RX ADMIN — POTASSIUM CHLORIDE 40 MEQ: 1500 TABLET, EXTENDED RELEASE ORAL at 09:18

## 2018-01-01 RX ADMIN — AMIODARONE HYDROCHLORIDE 200 MG: 200 TABLET ORAL at 08:14

## 2018-01-01 RX ADMIN — RISPERIDONE 0.5 MG: 0.25 TABLET ORAL at 21:47

## 2018-01-01 RX ADMIN — INSULIN GLARGINE 4 UNITS: 100 INJECTION, SOLUTION SUBCUTANEOUS at 21:04

## 2018-01-01 RX ADMIN — INSULIN GLARGINE 18 UNITS: 100 INJECTION, SOLUTION SUBCUTANEOUS at 17:22

## 2018-01-01 RX ADMIN — INSULIN LISPRO 4 UNITS: 100 INJECTION, SOLUTION INTRAVENOUS; SUBCUTANEOUS at 07:39

## 2018-01-01 RX ADMIN — METOPROLOL TARTRATE 12.5 MG: 25 TABLET ORAL at 20:23

## 2018-01-01 RX ADMIN — INSULIN LISPRO 2 UNITS: 100 INJECTION, SOLUTION INTRAVENOUS; SUBCUTANEOUS at 09:19

## 2018-01-01 RX ADMIN — MAGNESIUM SULFATE IN WATER 4 G: 40 INJECTION, SOLUTION INTRAVENOUS at 16:54

## 2018-01-01 RX ADMIN — ASPIRIN 81 MG: 81 TABLET, COATED ORAL at 08:39

## 2018-01-01 RX ADMIN — ACETAMINOPHEN AND CODEINE PHOSPHATE 1 TABLET: 300; 30 TABLET ORAL at 23:15

## 2018-01-01 RX ADMIN — INSULIN LISPRO 2 UNITS: 100 INJECTION, SOLUTION INTRAVENOUS; SUBCUTANEOUS at 11:30

## 2018-01-01 RX ADMIN — FAMOTIDINE 20 MG: 20 TABLET ORAL at 22:39

## 2018-01-01 RX ADMIN — INSULIN LISPRO 10 UNITS: 100 INJECTION, SOLUTION INTRAVENOUS; SUBCUTANEOUS at 16:37

## 2018-01-01 RX ADMIN — INSULIN LISPRO 2 UNITS: 100 INJECTION, SOLUTION INTRAVENOUS; SUBCUTANEOUS at 12:51

## 2018-01-01 RX ADMIN — SIMVASTATIN 10 MG: 10 TABLET, FILM COATED ORAL at 06:42

## 2018-01-01 RX ADMIN — FUROSEMIDE 20 MG: 10 INJECTION, SOLUTION INTRAMUSCULAR; INTRAVENOUS at 06:45

## 2018-01-01 RX ADMIN — POTASSIUM CHLORIDE 40 MEQ: 1500 TABLET, EXTENDED RELEASE ORAL at 15:26

## 2018-01-01 RX ADMIN — RISPERIDONE 0.5 MG: 0.25 TABLET ORAL at 22:27

## 2018-01-01 RX ADMIN — FLUOXETINE 40 MG: 20 CAPSULE ORAL at 22:13

## 2018-01-01 RX ADMIN — METOPROLOL TARTRATE 12.5 MG: 25 TABLET ORAL at 09:59

## 2018-01-01 RX ADMIN — QUETIAPINE FUMARATE 12.5 MG: 25 TABLET ORAL at 21:01

## 2018-01-01 RX ADMIN — SODIUM CHLORIDE 1000 MG: 900 INJECTION, SOLUTION INTRAVENOUS at 05:27

## 2018-01-01 RX ADMIN — ASPIRIN 81 MG: 81 TABLET, COATED ORAL at 09:36

## 2018-01-01 RX ADMIN — LACTULOSE 20 G: 10 SOLUTION ORAL at 17:12

## 2018-01-01 RX ADMIN — FLUOXETINE 20 MG: 20 CAPSULE ORAL at 08:34

## 2018-01-01 RX ADMIN — DOXYCYCLINE HYCLATE 100 MG: 100 CAPSULE ORAL at 08:39

## 2018-01-01 RX ADMIN — METOPROLOL TARTRATE 25 MG: 25 TABLET ORAL at 21:41

## 2018-01-01 RX ADMIN — AMIODARONE HYDROCHLORIDE 200 MG: 200 TABLET ORAL at 09:18

## 2018-01-01 RX ADMIN — FUROSEMIDE 40 MG: 40 TABLET ORAL at 08:17

## 2018-01-01 RX ADMIN — INSULIN LISPRO 2 UNITS: 100 INJECTION, SOLUTION INTRAVENOUS; SUBCUTANEOUS at 17:47

## 2018-01-01 RX ADMIN — FLUOXETINE 20 MG: 20 CAPSULE ORAL at 09:18

## 2018-01-01 RX ADMIN — FAMOTIDINE 20 MG: 20 TABLET, FILM COATED ORAL at 10:54

## 2018-01-01 RX ADMIN — SIMVASTATIN 10 MG: 10 TABLET, FILM COATED ORAL at 22:13

## 2018-01-01 RX ADMIN — LIDOCAINE HYDROCHLORIDE: 10 INJECTION, SOLUTION EPIDURAL; INFILTRATION; INTRACAUDAL; PERINEURAL at 18:58

## 2018-01-01 RX ADMIN — METOPROLOL TARTRATE 25 MG: 25 TABLET ORAL at 22:13

## 2018-01-01 RX ADMIN — PIPERACILLIN SODIUM,TAZOBACTAM SODIUM 3.38 G: 3; .375 INJECTION, POWDER, FOR SOLUTION INTRAVENOUS at 14:24

## 2018-01-01 RX ADMIN — PIPERACILLIN SODIUM,TAZOBACTAM SODIUM 3.38 G: 3; .375 INJECTION, POWDER, FOR SOLUTION INTRAVENOUS at 00:24

## 2018-01-01 RX ADMIN — PIPERACILLIN SODIUM,TAZOBACTAM SODIUM 3.38 G: 3; .375 INJECTION, POWDER, FOR SOLUTION INTRAVENOUS at 04:03

## 2018-01-01 RX ADMIN — RISPERIDONE 1 MG: 1 TABLET ORAL at 08:18

## 2018-01-01 RX ADMIN — PIPERACILLIN SODIUM,TAZOBACTAM SODIUM 3.38 G: 3; .375 INJECTION, POWDER, FOR SOLUTION INTRAVENOUS at 21:40

## 2018-01-01 RX ADMIN — RISPERIDONE 0.5 MG: 0.25 TABLET ORAL at 21:40

## 2018-01-01 RX ADMIN — PREDNISOLONE ACETATE 1 DROP: 10 SUSPENSION/ DROPS OPHTHALMIC at 09:00

## 2018-01-01 RX ADMIN — QUETIAPINE FUMARATE 12.5 MG: 25 TABLET ORAL at 00:20

## 2018-01-01 RX ADMIN — Medication 400 MG: at 11:12

## 2018-01-01 RX ADMIN — APIXABAN 5 MG: 5 TABLET, FILM COATED ORAL at 00:19

## 2018-01-01 RX ADMIN — SODIUM CHLORIDE 1000 ML: 900 INJECTION, SOLUTION INTRAVENOUS at 14:50

## 2018-01-01 RX ADMIN — PIPERACILLIN SODIUM,TAZOBACTAM SODIUM 3.38 G: 3; .375 INJECTION, POWDER, FOR SOLUTION INTRAVENOUS at 21:13

## 2018-01-01 RX ADMIN — DEXTROSE MONOHYDRATE 12.5 G: 25 INJECTION, SOLUTION INTRAVENOUS at 14:16

## 2018-01-01 RX ADMIN — SIMVASTATIN 10 MG: 10 TABLET, FILM COATED ORAL at 00:19

## 2018-01-01 RX ADMIN — DOXYCYCLINE HYCLATE 100 MG: 100 CAPSULE ORAL at 09:59

## 2018-01-01 RX ADMIN — LIDOCAINE HYDROCHLORIDE: 10 INJECTION, SOLUTION EPIDURAL; INFILTRATION; INTRACAUDAL; PERINEURAL at 11:00

## 2018-01-01 RX ADMIN — LACTULOSE 20 G: 10 SOLUTION ORAL at 09:19

## 2018-01-01 RX ADMIN — QUETIAPINE FUMARATE 12.5 MG: 25 TABLET ORAL at 08:18

## 2018-01-01 RX ADMIN — PREDNISOLONE ACETATE 1 DROP: 10 SUSPENSION/ DROPS OPHTHALMIC at 08:35

## 2018-01-01 RX ADMIN — APIXABAN 5 MG: 5 TABLET, FILM COATED ORAL at 17:47

## 2018-01-01 RX ADMIN — FAMOTIDINE 20 MG: 20 TABLET ORAL at 22:58

## 2018-01-01 RX ADMIN — DILTIAZEM HYDROCHLORIDE 30 MG: 30 TABLET, FILM COATED ORAL at 06:42

## 2018-01-01 RX ADMIN — ACETAMINOPHEN 650 MG: 325 TABLET, FILM COATED ORAL at 16:23

## 2018-01-01 RX ADMIN — ASPIRIN 81 MG: 81 TABLET, COATED ORAL at 08:16

## 2018-01-01 RX ADMIN — FLUOXETINE 40 MG: 20 CAPSULE ORAL at 00:19

## 2018-01-01 RX ADMIN — QUETIAPINE FUMARATE 12.5 MG: 25 TABLET ORAL at 22:39

## 2018-01-01 RX ADMIN — PIPERACILLIN SODIUM,TAZOBACTAM SODIUM 3.38 G: 3; .375 INJECTION, POWDER, FOR SOLUTION INTRAVENOUS at 14:49

## 2018-01-01 RX ADMIN — PREDNISOLONE ACETATE 1 DROP: 10 SUSPENSION/ DROPS OPHTHALMIC at 18:00

## 2018-01-01 RX ADMIN — PREDNISOLONE ACETATE 1 DROP: 10 SUSPENSION/ DROPS OPHTHALMIC at 18:12

## 2018-01-01 RX ADMIN — LIDOCAINE HYDROCHLORIDE: 10 INJECTION, SOLUTION EPIDURAL; INFILTRATION; INTRACAUDAL; PERINEURAL at 19:59

## 2018-01-01 RX ADMIN — FAMOTIDINE 20 MG: 20 TABLET ORAL at 00:20

## 2018-01-01 RX ADMIN — DOXYCYCLINE HYCLATE 100 MG: 100 CAPSULE ORAL at 21:38

## 2018-01-01 RX ADMIN — METOPROLOL TARTRATE 25 MG: 25 TABLET ORAL at 21:01

## 2018-01-01 RX ADMIN — QUETIAPINE FUMARATE 12.5 MG: 25 TABLET ORAL at 22:03

## 2018-01-01 RX ADMIN — DILTIAZEM HYDROCHLORIDE 30 MG: 30 TABLET, FILM COATED ORAL at 16:14

## 2018-01-01 RX ADMIN — RISPERIDONE 0.5 MG: 0.25 TABLET ORAL at 21:00

## 2018-01-01 RX ADMIN — SIMVASTATIN 10 MG: 10 TABLET, FILM COATED ORAL at 22:03

## 2018-01-01 RX ADMIN — INSULIN LISPRO 6 UNITS: 100 INJECTION, SOLUTION INTRAVENOUS; SUBCUTANEOUS at 12:34

## 2018-01-01 RX ADMIN — AMIODARONE HYDROCHLORIDE 200 MG: 200 TABLET ORAL at 08:37

## 2018-01-01 RX ADMIN — LIDOCAINE HYDROCHLORIDE: 10 INJECTION, SOLUTION EPIDURAL; INFILTRATION; INTRACAUDAL; PERINEURAL at 13:00

## 2018-01-01 RX ADMIN — PIPERACILLIN SODIUM,TAZOBACTAM SODIUM 3.38 G: 3; .375 INJECTION, POWDER, FOR SOLUTION INTRAVENOUS at 08:50

## 2018-01-01 RX ADMIN — SIMVASTATIN 10 MG: 10 TABLET, FILM COATED ORAL at 22:58

## 2018-01-01 RX ADMIN — METOPROLOL TARTRATE 12.5 MG: 25 TABLET ORAL at 21:38

## 2018-01-01 RX ADMIN — LIDOCAINE HYDROCHLORIDE: 10 INJECTION, SOLUTION EPIDURAL; INFILTRATION; INTRACAUDAL; PERINEURAL at 21:14

## 2018-01-01 RX ADMIN — INSULIN LISPRO 2 UNITS: 100 INJECTION, SOLUTION INTRAVENOUS; SUBCUTANEOUS at 13:12

## 2018-01-01 RX ADMIN — FLUOXETINE 40 MG: 20 CAPSULE ORAL at 08:16

## 2018-01-01 RX ADMIN — VITAMIN D, TAB 1000IU (100/BT) 2000 UNITS: 25 TAB at 09:18

## 2018-01-01 RX ADMIN — METOPROLOL TARTRATE 25 MG: 25 TABLET ORAL at 20:17

## 2018-01-01 RX ADMIN — QUETIAPINE FUMARATE 12.5 MG: 25 TABLET ORAL at 22:27

## 2018-01-01 RX ADMIN — POTASSIUM CHLORIDE 40 MEQ: 1.5 POWDER, FOR SOLUTION ORAL at 00:24

## 2018-01-01 RX ADMIN — PIPERACILLIN SODIUM,TAZOBACTAM SODIUM 3.38 G: 3; .375 INJECTION, POWDER, FOR SOLUTION INTRAVENOUS at 03:00

## 2018-01-01 RX ADMIN — PIPERACILLIN SODIUM,TAZOBACTAM SODIUM 3.38 G: 3; .375 INJECTION, POWDER, FOR SOLUTION INTRAVENOUS at 20:53

## 2018-01-01 RX ADMIN — INSULIN LISPRO 4 UNITS: 100 INJECTION, SOLUTION INTRAVENOUS; SUBCUTANEOUS at 23:03

## 2018-01-01 RX ADMIN — VITAMIN D, TAB 1000IU (100/BT) 2000 UNITS: 25 TAB at 08:33

## 2018-01-01 RX ADMIN — DOXYCYCLINE HYCLATE 100 MG: 100 CAPSULE ORAL at 22:40

## 2018-01-01 RX ADMIN — INSULIN LISPRO 4 UNITS: 100 INJECTION, SOLUTION INTRAVENOUS; SUBCUTANEOUS at 22:11

## 2018-01-01 RX ADMIN — APIXABAN 5 MG: 5 TABLET, FILM COATED ORAL at 21:38

## 2018-01-01 RX ADMIN — METOPROLOL TARTRATE 12.5 MG: 25 TABLET ORAL at 22:41

## 2018-01-01 RX ADMIN — FLUOXETINE 20 MG: 20 CAPSULE ORAL at 09:36

## 2018-01-01 RX ADMIN — PIPERACILLIN SODIUM,TAZOBACTAM SODIUM 3.38 G: 3; .375 INJECTION, POWDER, FOR SOLUTION INTRAVENOUS at 01:08

## 2018-01-01 RX ADMIN — RISPERIDONE 1 MG: 1 TABLET ORAL at 21:48

## 2018-01-01 RX ADMIN — APIXABAN 5 MG: 5 TABLET, FILM COATED ORAL at 18:12

## 2018-01-01 RX ADMIN — INSULIN LISPRO 2 UNITS: 100 INJECTION, SOLUTION INTRAVENOUS; SUBCUTANEOUS at 17:22

## 2018-01-01 RX ADMIN — INSULIN LISPRO 6 UNITS: 100 INJECTION, SOLUTION INTRAVENOUS; SUBCUTANEOUS at 12:52

## 2018-01-01 RX ADMIN — METOPROLOL TARTRATE 25 MG: 25 TABLET ORAL at 08:18

## 2018-01-01 RX ADMIN — LOSARTAN POTASSIUM 25 MG: 25 TABLET ORAL at 08:18

## 2018-01-01 RX ADMIN — INSULIN GLARGINE 4 UNITS: 100 INJECTION, SOLUTION SUBCUTANEOUS at 22:36

## 2018-01-01 RX ADMIN — ALBUTEROL SULFATE 2.5 MG: 2.5 SOLUTION RESPIRATORY (INHALATION) at 22:33

## 2018-01-01 RX ADMIN — ASPIRIN 81 MG: 81 TABLET, COATED ORAL at 08:14

## 2018-01-01 RX ADMIN — METOPROLOL TARTRATE 25 MG: 25 TABLET ORAL at 00:18

## 2018-01-01 RX ADMIN — INSULIN LISPRO 4 UNITS: 100 INJECTION, SOLUTION INTRAVENOUS; SUBCUTANEOUS at 22:00

## 2018-01-01 RX ADMIN — ACETAMINOPHEN 500 MG: 500 TABLET, FILM COATED ORAL at 21:48

## 2018-01-01 RX ADMIN — Medication 400 MG: at 09:19

## 2018-01-01 RX ADMIN — LIDOCAINE HYDROCHLORIDE: 10 INJECTION, SOLUTION EPIDURAL; INFILTRATION; INTRACAUDAL; PERINEURAL at 12:00

## 2018-01-01 RX ADMIN — QUETIAPINE FUMARATE 12.5 MG: 25 TABLET ORAL at 21:46

## 2018-01-01 RX ADMIN — POTASSIUM CHLORIDE 40 MEQ: 1500 TABLET, EXTENDED RELEASE ORAL at 22:02

## 2018-01-01 RX ADMIN — APIXABAN 5 MG: 5 TABLET, FILM COATED ORAL at 17:46

## 2018-01-01 RX ADMIN — LACTULOSE 20 G: 10 SOLUTION ORAL at 09:36

## 2018-01-01 RX ADMIN — PIPERACILLIN SODIUM,TAZOBACTAM SODIUM 3.38 G: 3; .375 INJECTION, POWDER, FOR SOLUTION INTRAVENOUS at 14:53

## 2018-01-01 RX ADMIN — INSULIN LISPRO 2 UNITS: 100 INJECTION, SOLUTION INTRAVENOUS; SUBCUTANEOUS at 16:06

## 2018-01-01 RX ADMIN — PIPERACILLIN SODIUM,TAZOBACTAM SODIUM 3.38 G: 3; .375 INJECTION, POWDER, FOR SOLUTION INTRAVENOUS at 22:57

## 2018-01-01 RX ADMIN — Medication 400 MG: at 08:31

## 2018-01-01 RX ADMIN — SIMVASTATIN 10 MG: 10 TABLET, FILM COATED ORAL at 21:00

## 2018-01-01 RX ADMIN — VITAMIN D, TAB 1000IU (100/BT) 2000 UNITS: 25 TAB at 09:59

## 2018-01-01 RX ADMIN — DOXYCYCLINE HYCLATE 100 MG: 100 CAPSULE ORAL at 20:23

## 2018-01-01 RX ADMIN — METOPROLOL TARTRATE 25 MG: 25 TABLET ORAL at 09:22

## 2018-01-01 RX ADMIN — RISPERIDONE 0.5 MG: 0.25 TABLET ORAL at 22:39

## 2018-01-01 RX ADMIN — VITAMIN D, TAB 1000IU (100/BT) 2000 UNITS: 25 TAB at 08:39

## 2018-01-01 RX ADMIN — LACTULOSE 20 G: 10 SOLUTION ORAL at 18:00

## 2018-01-01 RX ADMIN — PIPERACILLIN SODIUM,TAZOBACTAM SODIUM 3.38 G: 3; .375 INJECTION, POWDER, FOR SOLUTION INTRAVENOUS at 15:41

## 2018-01-01 RX ADMIN — PREDNISOLONE ACETATE 1 DROP: 10 SUSPENSION/ DROPS OPHTHALMIC at 17:22

## 2018-01-01 RX ADMIN — APIXABAN 5 MG: 5 TABLET, FILM COATED ORAL at 08:14

## 2018-01-01 RX ADMIN — SIMVASTATIN 10 MG: 10 TABLET, FILM COATED ORAL at 21:49

## 2018-01-01 RX ADMIN — APIXABAN 5 MG: 5 TABLET, FILM COATED ORAL at 08:39

## 2018-01-01 RX ADMIN — FLUOXETINE 20 MG: 20 CAPSULE ORAL at 08:39

## 2018-01-01 RX ADMIN — INSULIN LISPRO 2 UNITS: 100 INJECTION, SOLUTION INTRAVENOUS; SUBCUTANEOUS at 12:38

## 2018-01-01 RX ADMIN — ASPIRIN 81 MG: 81 TABLET, COATED ORAL at 09:59

## 2018-01-01 RX ADMIN — FAMOTIDINE 20 MG: 20 TABLET ORAL at 21:46

## 2018-01-01 RX ADMIN — LOSARTAN POTASSIUM 25 MG: 25 TABLET ORAL at 09:21

## 2018-01-01 RX ADMIN — APIXABAN 5 MG: 5 TABLET, FILM COATED ORAL at 11:06

## 2018-01-01 RX ADMIN — SIMVASTATIN 10 MG: 10 TABLET, FILM COATED ORAL at 21:48

## 2018-01-01 RX ADMIN — INSULIN LISPRO 2 UNITS: 100 INJECTION, SOLUTION INTRAVENOUS; SUBCUTANEOUS at 17:11

## 2018-01-01 RX ADMIN — RISPERIDONE 1 MG: 1 TABLET ORAL at 17:48

## 2018-01-01 RX ADMIN — QUETIAPINE FUMARATE 12.5 MG: 25 TABLET ORAL at 17:48

## 2018-01-01 RX ADMIN — FUROSEMIDE 40 MG: 40 TABLET ORAL at 14:26

## 2018-01-01 RX ADMIN — SODIUM CHLORIDE 50 ML/HR: 900 INJECTION, SOLUTION INTRAVENOUS at 10:05

## 2018-01-01 RX ADMIN — FAMOTIDINE 20 MG: 20 TABLET ORAL at 21:00

## 2018-01-01 RX ADMIN — INSULIN LISPRO 4 UNITS: 100 INJECTION, SOLUTION INTRAVENOUS; SUBCUTANEOUS at 23:22

## 2018-01-01 RX ADMIN — INSULIN LISPRO 4 UNITS: 100 INJECTION, SOLUTION INTRAVENOUS; SUBCUTANEOUS at 22:38

## 2018-01-01 RX ADMIN — RISPERIDONE 1 MG: 1 TABLET ORAL at 22:13

## 2018-01-01 RX ADMIN — FAMOTIDINE 20 MG: 20 TABLET ORAL at 22:27

## 2018-01-01 RX ADMIN — QUETIAPINE FUMARATE 12.5 MG: 25 TABLET ORAL at 08:16

## 2018-01-01 RX ADMIN — PIPERACILLIN SODIUM,TAZOBACTAM SODIUM 3.38 G: 3; .375 INJECTION, POWDER, FOR SOLUTION INTRAVENOUS at 09:18

## 2018-01-01 RX ADMIN — POTASSIUM CHLORIDE 10 MEQ: 10 INJECTION, SOLUTION INTRAVENOUS at 15:17

## 2018-01-01 RX ADMIN — PREDNISOLONE ACETATE 1 DROP: 10 SUSPENSION/ DROPS OPHTHALMIC at 08:46

## 2018-01-01 RX ADMIN — INSULIN LISPRO 4 UNITS: 100 INJECTION, SOLUTION INTRAVENOUS; SUBCUTANEOUS at 18:09

## 2018-01-01 RX ADMIN — INSULIN LISPRO 4 UNITS: 100 INJECTION, SOLUTION INTRAVENOUS; SUBCUTANEOUS at 21:38

## 2018-01-01 RX ADMIN — ASPIRIN 81 MG: 81 TABLET, COATED ORAL at 09:18

## 2018-01-01 RX ADMIN — Medication 400 MG: at 09:36

## 2018-01-01 RX ADMIN — ALBUTEROL SULFATE 2.5 MG: 2.5 SOLUTION RESPIRATORY (INHALATION) at 07:27

## 2018-01-01 RX ADMIN — DILTIAZEM HYDROCHLORIDE 30 MG: 30 TABLET, FILM COATED ORAL at 11:46

## 2018-01-01 RX ADMIN — PIPERACILLIN SODIUM,TAZOBACTAM SODIUM 3.38 G: 3; .375 INJECTION, POWDER, FOR SOLUTION INTRAVENOUS at 09:19

## 2018-01-01 RX ADMIN — INSULIN LISPRO 2 UNITS: 100 INJECTION, SOLUTION INTRAVENOUS; SUBCUTANEOUS at 07:30

## 2018-01-01 RX ADMIN — VANCOMYCIN HYDROCHLORIDE 2000 MG: 10 INJECTION, POWDER, LYOPHILIZED, FOR SOLUTION INTRAVENOUS at 16:06

## 2018-01-01 RX ADMIN — Medication 10 ML: at 21:45

## 2018-01-01 RX ADMIN — PREDNISONE 40 MG: 20 TABLET ORAL at 10:54

## 2018-01-01 RX ADMIN — SIMVASTATIN 10 MG: 10 TABLET, FILM COATED ORAL at 23:15

## 2018-01-01 RX ADMIN — INSULIN GLARGINE 4 UNITS: 100 INJECTION, SOLUTION SUBCUTANEOUS at 21:38

## 2018-01-01 RX ADMIN — LACTULOSE 20 G: 10 SOLUTION ORAL at 17:46

## 2018-01-01 RX ADMIN — Medication 400 MG: at 09:18

## 2018-01-01 RX ADMIN — FAMOTIDINE 20 MG: 20 TABLET ORAL at 22:03

## 2018-01-01 RX ADMIN — INSULIN LISPRO 2 UNITS: 100 INJECTION, SOLUTION INTRAVENOUS; SUBCUTANEOUS at 13:00

## 2018-01-01 RX ADMIN — ASPIRIN 81 MG: 81 TABLET, COATED ORAL at 09:28

## 2018-01-01 RX ADMIN — INSULIN LISPRO 12 UNITS: 100 INJECTION, SOLUTION INTRAVENOUS; SUBCUTANEOUS at 17:32

## 2018-01-01 RX ADMIN — RISPERIDONE 0.5 MG: 0.25 TABLET ORAL at 22:58

## 2018-01-01 RX ADMIN — SIMVASTATIN 10 MG: 10 TABLET, FILM COATED ORAL at 21:40

## 2018-01-01 RX ADMIN — LIDOCAINE HYDROCHLORIDE: 10 INJECTION, SOLUTION EPIDURAL; INFILTRATION; INTRACAUDAL; PERINEURAL at 23:00

## 2018-01-01 RX ADMIN — QUETIAPINE FUMARATE 12.5 MG: 25 TABLET ORAL at 21:41

## 2018-01-01 RX ADMIN — POTASSIUM CHLORIDE 10 MEQ: 10 INJECTION, SOLUTION INTRAVENOUS at 16:23

## 2018-01-01 RX ADMIN — LACTULOSE 20 G: 10 SOLUTION ORAL at 18:09

## 2018-01-01 RX ADMIN — BARIUM SULFATE 60 G: 960 POWDER, FOR SUSPENSION ORAL at 14:00

## 2018-01-01 RX ADMIN — POTASSIUM CHLORIDE 40 MEQ: 1500 TABLET, EXTENDED RELEASE ORAL at 22:39

## 2018-01-01 RX ADMIN — ALBUTEROL SULFATE 2.5 MG: 2.5 SOLUTION RESPIRATORY (INHALATION) at 10:54

## 2018-01-01 RX ADMIN — INSULIN GLARGINE 4 UNITS: 100 INJECTION, SOLUTION SUBCUTANEOUS at 22:59

## 2018-01-01 RX ADMIN — SIMVASTATIN 10 MG: 10 TABLET, FILM COATED ORAL at 22:27

## 2018-01-01 RX ADMIN — INSULIN LISPRO 6 UNITS: 100 INJECTION, SOLUTION INTRAVENOUS; SUBCUTANEOUS at 11:30

## 2018-01-01 RX ADMIN — DOXYCYCLINE HYCLATE 100 MG: 100 CAPSULE ORAL at 09:36

## 2018-01-01 RX ADMIN — APIXABAN 5 MG: 5 TABLET, FILM COATED ORAL at 09:18

## 2018-01-01 RX ADMIN — APIXABAN 5 MG: 5 TABLET, FILM COATED ORAL at 08:34

## 2018-01-01 RX ADMIN — LOSARTAN POTASSIUM 50 MG: 50 TABLET ORAL at 08:15

## 2018-01-01 RX ADMIN — LIDOCAINE HYDROCHLORIDE: 10 INJECTION, SOLUTION EPIDURAL; INFILTRATION; INTRACAUDAL; PERINEURAL at 10:22

## 2018-01-01 RX ADMIN — ALBUTEROL SULFATE 2.5 MG: 2.5 SOLUTION RESPIRATORY (INHALATION) at 21:11

## 2018-01-01 RX ADMIN — SIMVASTATIN 10 MG: 10 TABLET, FILM COATED ORAL at 22:39

## 2018-01-01 RX ADMIN — PREDNISOLONE ACETATE 1 DROP: 10 SUSPENSION/ DROPS OPHTHALMIC at 09:37

## 2018-01-01 RX ADMIN — BARIUM SULFATE 60 ML: 400 SUSPENSION ORAL at 14:00

## 2018-01-01 RX ADMIN — QUETIAPINE FUMARATE 12.5 MG: 25 TABLET ORAL at 23:06

## 2018-01-01 RX ADMIN — FLUTICASONE FUROATE 1 PUFF: 100 POWDER RESPIRATORY (INHALATION) at 09:00

## 2018-01-01 RX ADMIN — Medication 400 MG: at 09:59

## 2018-01-01 RX ADMIN — VITAMIN D, TAB 1000IU (100/BT) 2000 UNITS: 25 TAB at 09:19

## 2018-01-01 RX ADMIN — FLUOXETINE 20 MG: 20 CAPSULE ORAL at 09:59

## 2018-01-01 RX ADMIN — PREDNISOLONE ACETATE 1 DROP: 10 SUSPENSION/ DROPS OPHTHALMIC at 19:32

## 2018-01-01 RX ADMIN — FLUOXETINE 40 MG: 20 CAPSULE ORAL at 21:48

## 2018-01-01 RX ADMIN — QUETIAPINE FUMARATE 12.5 MG: 25 TABLET ORAL at 23:15

## 2018-01-01 RX ADMIN — APIXABAN 5 MG: 5 TABLET, FILM COATED ORAL at 20:17

## 2018-01-01 RX ADMIN — AMIODARONE HYDROCHLORIDE 200 MG: 200 TABLET ORAL at 08:16

## 2018-01-01 RX ADMIN — PREDNISOLONE ACETATE 1 DROP: 10 SUSPENSION/ DROPS OPHTHALMIC at 19:05

## 2018-01-01 RX ADMIN — POTASSIUM CHLORIDE 40 MEQ: 1500 TABLET, EXTENDED RELEASE ORAL at 16:57

## 2018-01-01 RX ADMIN — LACTULOSE 20 G: 10 SOLUTION ORAL at 10:00

## 2018-01-01 RX ADMIN — APIXABAN 5 MG: 5 TABLET, FILM COATED ORAL at 08:16

## 2018-01-01 RX ADMIN — AMIODARONE HYDROCHLORIDE 200 MG: 200 TABLET ORAL at 09:29

## 2018-01-01 RX ADMIN — INSULIN GLARGINE 18 UNITS: 100 INJECTION, SOLUTION SUBCUTANEOUS at 17:20

## 2018-01-01 RX ADMIN — INSULIN LISPRO 4 UNITS: 100 INJECTION, SOLUTION INTRAVENOUS; SUBCUTANEOUS at 04:00

## 2018-01-01 RX ADMIN — POTASSIUM CHLORIDE 40 MEQ: 1500 TABLET, EXTENDED RELEASE ORAL at 09:59

## 2018-01-01 RX ADMIN — RISPERIDONE 1 MG: 0.25 TABLET ORAL at 00:16

## 2018-01-01 RX ADMIN — APIXABAN 5 MG: 5 TABLET, FILM COATED ORAL at 09:19

## 2018-01-01 RX ADMIN — INSULIN GLARGINE 4 UNITS: 100 INJECTION, SOLUTION SUBCUTANEOUS at 22:00

## 2018-01-01 RX ADMIN — APIXABAN 5 MG: 5 TABLET, FILM COATED ORAL at 09:29

## 2018-01-01 RX ADMIN — VANCOMYCIN HYDROCHLORIDE 1500 MG: 10 INJECTION, POWDER, LYOPHILIZED, FOR SOLUTION INTRAVENOUS at 13:11

## 2018-01-01 RX ADMIN — SODIUM CHLORIDE 1000 MG: 900 INJECTION, SOLUTION INTRAVENOUS at 17:45

## 2018-01-01 RX ADMIN — FLUTICASONE FUROATE 1 PUFF: 100 POWDER RESPIRATORY (INHALATION) at 07:58

## 2018-01-01 RX ADMIN — APIXABAN 5 MG: 5 TABLET, FILM COATED ORAL at 17:32

## 2018-01-01 RX ADMIN — PIPERACILLIN SODIUM,TAZOBACTAM SODIUM 3.38 G: 3; .375 INJECTION, POWDER, FOR SOLUTION INTRAVENOUS at 08:29

## 2018-01-01 RX ADMIN — FLUTICASONE FUROATE 1 PUFF: 100 POWDER RESPIRATORY (INHALATION) at 10:48

## 2018-01-01 RX ADMIN — ACETAMINOPHEN AND CODEINE PHOSPHATE 1 TABLET: 300; 30 TABLET ORAL at 06:55

## 2018-01-01 RX ADMIN — INSULIN LISPRO 6 UNITS: 100 INJECTION, SOLUTION INTRAVENOUS; SUBCUTANEOUS at 07:30

## 2018-01-01 RX ADMIN — METOPROLOL TARTRATE 25 MG: 25 TABLET ORAL at 09:28

## 2018-01-01 RX ADMIN — VITAMIN D, TAB 1000IU (100/BT) 2000 UNITS: 25 TAB at 09:36

## 2018-01-01 RX ADMIN — DEXTROSE MONOHYDRATE 12.5 G: 25 INJECTION, SOLUTION INTRAVENOUS at 16:46

## 2018-01-01 RX ADMIN — PIPERACILLIN SODIUM,TAZOBACTAM SODIUM 3.38 G: 3; .375 INJECTION, POWDER, FOR SOLUTION INTRAVENOUS at 09:55

## 2018-01-01 RX ADMIN — BARIUM SULFATE 30 ML: 400 PASTE ORAL at 14:00

## 2018-01-01 RX ADMIN — RISPERIDONE 0.5 MG: 0.25 TABLET ORAL at 22:03

## 2018-01-01 RX ADMIN — METOPROLOL TARTRATE 12.5 MG: 25 TABLET ORAL at 09:36

## 2018-01-01 RX ADMIN — BARIUM SULFATE 700 MG: 700 TABLET ORAL at 14:00

## 2018-01-01 RX ADMIN — PIPERACILLIN SODIUM,TAZOBACTAM SODIUM 3.38 G: 3; .375 INJECTION, POWDER, FOR SOLUTION INTRAVENOUS at 02:11

## 2018-06-23 PROBLEM — I48.91 ATRIAL FIBRILLATION WITH RVR (HCC): Status: ACTIVE | Noted: 2018-01-01

## 2018-06-23 PROBLEM — F99 PSYCHIATRIC DISORDER: Status: ACTIVE | Noted: 2018-01-01

## 2018-06-23 PROBLEM — R60.0 LOWER EXTREMITY EDEMA: Status: ACTIVE | Noted: 2018-01-01

## 2018-06-23 PROBLEM — Z79.01 CHRONIC ANTICOAGULATION: Status: ACTIVE | Noted: 2018-01-01

## 2018-06-23 NOTE — PROGRESS NOTES
conducted an initial consultation and Spiritual Assessment for Audrey Drake, who is a 66 y.o.,female. Patients Primary Language is: [place]. According to the patients EMR Latter day Affiliation is: [place]. The reason the Patient came to the hospital is:   Patient Active Problem List    Diagnosis Date Noted    Atrial fibrillation with RVR ([facility] [address] 06/23/2018    Chronic anticoagulation 06/23/2018    Lower extremity edema 06/23/2018    Psychiatric disorder 06/23/2018    Edema 02/28/2017    Shortness of breath 02/28/2017    Pneumonia 02/28/2017    Cellulitis of left lower extremity 02/28/2017    Hip arthritis 01/26/2017    Cough 04/12/2016        The  provided the following Interventions:  Initiated a relationship of care and support. Explored issues of giancarlo, belief, spirituality and Mormon/ritual needs while hospitalized. Listened empathically. Provided chaplaincy education. Provided information about Spiritual Care Services. Offered prayer and assurance of continued prayers on patient's behalf. Chart reviewed. The following outcomes where achieved:  Patient shared limited information about both their medical narrative and spiritual journey/beliefs.  confirmed Patient's Latter day Affiliation. Patient processed feeling about current hospitalization. Patient expressed gratitude for 's visit. Assessment:  Patient does not have any Mormon/cultural needs that will affect patients preferences in health care. There are no spiritual or Mormon issues which require intervention at this time. Plan:  Chaplains will continue to follow and will provide pastoral care on an as needed/requested basis.  recommends bedside caregivers page  on duty if patient shows signs of acute spiritual or emotional distress.         [facility].   [phone]

## 2018-06-23 NOTE — ROUTINE PROCESS
Cardizem po given this morning, IV cardizem stopped at 0800 as ordered by Dr Dion Dale. Bedside and Verbal shift change report given to Yesika Rice (oncoming nurse) by Tree Nevarez RN (offgoing nurse). Report included the following information SBAR, Kardex, MAR and Recent Results.

## 2018-06-23 NOTE — CONSULTS
[facility] - Consult Note    Consultation request by Fredo Solo MD for advice/opinion related to evaluating AFIB; Atrial fibrillation with RVR ([facility] [address] Atrial fibrillation*    Date of  Admission: 6/23/2018  5:28 AM   Primary Care Physician:  Sandra Mckay MD     Assessment:     --Paroxysmal atrial fibrillation. Prolonged history of this dating back for several years. Apparently patient has had issues with this during admissions at [facility].  She has been managed with Eliquis for oral anticoagulation and amiodarone for rhythm control. In the past, she has been treated with both metoprolol and diltiazem to help with rate control as well. She was started on oral diltiazem this admission.  --Valvular heart disease. Echocardiogram done today demonstrated wide-open tricuspid regurgitation and moderate to severe mitral regurgitation. Her LV function was difficult to assess, but her ejection fraction appeared to be grossly normal in the 50-60% range. Historically her LV function has been normal.  This degree of regurgitation appears to be new. She is not a candidate for any sort of valve surgery. She does not appear to be in decompensated heart failure. --History of junctional bradycardia. Because of this I would not be overly aggressive on her rate slowing agents. --Essential hypertension. Patient blood pressure appears to be well-controlled. --Anemia. This appears to be chronic with slightly worsening H&H. --Alzheimer's dementia. Patient now resides at the [address] [place] [address]. --Diabetes mellitus type 2.  -Chest pain-this was in the setting of rapid atrial fibrillation. She may have underlying coronary disease this may have been from demand ischemia, but given her age and comorbidities she is not a candidate for an invasive workup. She does have history of a low risk stress test from 2016 at [facility].      Plan:     --We will continue oral amiodarone for now, would prefer starting metoprolol instead of diltiazem. This can be uptitrated if needed to help with rate control. --We will decrease her scheduled losartan dosage in order to up titrate her rate slowing agents. --Continue Eliquis for anticoagulation. This should be dosed 5 mg twice daily. Previously followed by [facility]. No follow-up over the past year since moving to the [facility]. History of Present Illness: This is a 66 y.o. female admitted for AFIB; Atrial fibrillation with RVR ([address] Atrial fibrillation*. Patient complains of: Chest pain    Patient was sent to the emergency room by her nursing home after she was complaining of chest pain and heart palpitations. She was found to be in rapid atrial fibrillation, she was treated with both IV diltiazem and IV metoprolol. For some reason she was sent first to the [facility] and was then later transferred to [facility] for admission. Upon arrival, her symptoms had significantly improved. History is somewhat limited due to her dementia. But she currently appears to be comfortable and pleasant. Cardiac risk factors: diabetes mellitus, sedentary life style      Review of Symptoms:  Except as stated above include:  Constitutional:  negative  Respiratory:  negative  Cardiovascular:  negative  Gastrointestinal: negative  Genitourinary:  negative  Musculoskeletal:  Negative  Neurological:  Negative  Dermatological:  Negative  Endocrinological: Negative  Psychological:  Negative    A comprehensive review of systems was negative except for that written in the HPI.      Past Medical History:     Past Medical History:   Diagnosis Date    Anemia NEC     Arthritis     Arthropathy, unspecified, site unspecified     Asthma     Atrial fibrillation (HCC)     CHRONIC A-FIB    Diabetes mellitus     Essential hypertension     Heart abnormalities     irregular heartbeat    Hypertension     Liver disease steatohepatitis    Neurological disorder     dementia    Osteoarthritis     LEFT HIP         Social History:     Social History     Social History    Marital status:      Spouse name: N/A    Number of children: N/A    Years of education: N/A     Social History Main Topics    Smoking status: Never Smoker    Smokeless tobacco: Never Used    Alcohol use No    Drug use: No    Sexual activity: No     Other Topics Concern    Not on file     Social History Narrative        Family History:     Family History   Problem Relation Age of Onset    Hypertension Mother     Diabetes Father     Diabetes Daughter         Medications:      Allergies   Allergen Reactions    Compazine [Prochlorperazine Edisylate] Other (comments)     Twitching, seizure-like symptoms        Current Facility-Administered Medications   Medication Dose Route Frequency    acetaminophen-codeine (TYLENOL #3) per tablet 1 Tab  1 Tab Oral Q6H PRN    amiodarone (CORDARONE) tablet 200 mg  200 mg Oral DAILY    aspirin delayed-release tablet 81 mg  81 mg Oral DAILY    FLUoxetine (PROzac) capsule 40 mg  40 mg Oral DAILY    polyethylene glycol (MIRALAX) packet 17 g  17 g Oral DAILY PRN    QUEtiapine (SEROquel) tablet 12.5 mg  12.5 mg Oral BID    risperiDONE (RisperDAL) tablet 1 mg  1 mg Oral BID    simvastatin (ZOCOR) tablet 10 mg  10 mg Oral QHS    acetaminophen (TYLENOL) tablet 650 mg  650 mg Oral Q6H PRN    naloxone (NARCAN) injection 0.4 mg  0.4 mg IntraVENous PRN    ondansetron (ZOFRAN) injection 4 mg  4 mg IntraVENous Q6H PRN    docusate sodium (COLACE) capsule 100 mg  100 mg Oral BID PRN    insulin lispro (HUMALOG) injection   SubCUTAneous AC&HS    glucose chewable tablet 16 g  4 Tab Oral PRN    glucagon (GLUCAGEN) injection 1 mg  1 mg IntraMUSCular PRN    dextrose (D50W) injection syrg 12.5-25 g  25-50 mL IntraVENous PRN    [START ON 6/24/2018] fluticasone furoate (ARNUITY ELLIPTA) 100 mcg/puff  1 Puff Inhalation DAILY    furosemide (LASIX) tablet 40 mg  40 mg Oral DAILY    apixaban (ELIQUIS) tablet 5 mg  5 mg Oral BID    [START ON 6/24/2018] losartan (COZAAR) tablet 25 mg  25 mg Oral DAILY    metoprolol tartrate (LOPRESSOR) tablet 25 mg  25 mg Oral BID         Physical Exam:     Visit Vitals    /75 (BP 1 Location: Left arm, BP Patient Position: At rest)    Pulse (!) 120    Temp 97.4 °F (36.3 °C)    Resp 20    Ht 5' 5\" (1.651 m)    Wt 101.6 kg (224 lb)    SpO2 95%    BMI 37.28 kg/m2     BP Readings from Last 3 Encounters:   06/23/18 113/75   03/08/18 130/80   03/02/18 145/73     Pulse Readings from Last 3 Encounters:   06/23/18 (!) 120   03/08/18 72   03/02/18 65     Wt Readings from Last 3 Encounters:   06/23/18 101.6 kg (224 lb)   01/02/18 86.2 kg (190 lb)   12/21/17 81.6 kg (180 lb)       General:  cooperative, no distress, appears stated age  Neck:  no carotid bruit, no JVD  Lungs: Diminished breath sounds at the bases, no wheezes, no rales  Heart:  irregularly irregular rhythm, 3/6 systolic murmur at the apex  Abdomen:  abdomen is soft without significant tenderness, masses, organomegaly or guarding  Extremities:  extremities normal, atraumatic, no cyanosis or edema  Skin: Warm and dry. no hyperpigmentation, vitiligo, or suspicious lesions  Neuro: Oriented to person and place, no focal neurological deficits, able to move all extremities.   Psych: non focal     Data Review:     Recent Labs      06/23/18   0540   WBC  6.0   HGB  9.9*   HCT  29.6*   PLT  203     Recent Labs      06/23/18   0540   NA  133*   K  3.5   CL  102   CO2  21   GLU  240*   BUN  14   CREA  0.93   CA  8.3*   ALB  3.0*   SGOT  15   ALT  25       Results for orders placed or performed during the hospital encounter of 12/21/17   EKG, 12 LEAD, INITIAL   Result Value Ref Range    Ventricular Rate 50 BPM    Atrial Rate 241 BPM    QRS Duration 80 ms    Q-T Interval 430 ms    QTC Calculation (Bezet) 392 ms    Calculated R Axis 18 degrees    Calculated T Axis 124 degrees    Diagnosis       Junctional rhythm  Cannot exclude Atrial fibrillation  Nonspecific T wave abnormality  Abnormal ECG  When compared with ECG of 08-AUG-2017 16:29,  Junctional rhythm has replaced Sinus rhythm  Non-specific change in ST segment in Inferior leads  Nonspecific T wave abnormality now evident in Inferior leads  Confirmed by Von Nguyen MD. ([id number]) on 12/30/2017 10:59:25 AM         All Cardiac Markers in the last 24 hours:    Lab Results   Component Value Date/Time     06/23/2018 12:00 PM    CPK 76 06/23/2018 05:40 AM    CKMB 1.8 06/23/2018 12:00 PM    CKMB 1.1 06/23/2018 05:40 AM    CKND1 1.6 06/23/2018 12:00 PM    CKND1 1.4 06/23/2018 05:40 AM    TROIQ 0.03 06/23/2018 12:00 PM    TROIQ <0.02 06/23/2018 05:40 AM       Last Lipid:    Lab Results   Component Value Date/Time    Cholesterol, total 118 (L) 03/01/2017 09:54 AM    HDL Cholesterol 48 03/01/2017 09:54 AM    LDL, calculated 54 03/01/2017 09:54 AM    Triglyceride 78 03/01/2017 09:54 AM    CHOL/HDL Ratio 2.5 03/01/2017 09:54 AM       Signed By: Amy Litten, MD     June 23, 2018

## 2018-06-23 NOTE — ROUTINE PROCESS
Report received from [place] from [facility].  Patient arrived from the hosp via ambulance. Assisted patient to bed. IV cardizem infusing well. Patient is anxious and has tremors. O2 via NC in place. MD is aware of the admission. Continue to monitor.

## 2018-06-23 NOTE — PROGRESS NOTES
[facility]  Progress Note    Patient: Amilcar Ramon Age: 66 y.o. : 1940 MR#: [id number] SSN: [id number]  Date: 2018     Subjective:     Sleeping, rouses to voice. On review, denies F/C, N/V, CP, SOB, palps, but seems to answer negatively to all questions and is confused: does not give name of facility she lives in. Has no c/o otherwise. On tele, AFib, rates in 90-100s. Off Cardizem gtt this AM and receiving PO doses, confirmed with nursing. Assessment/Plan:   1. AFib c RVR - hx cardioversion in 2017. Is s/p Cardizem gtt, switched to PO this AM. Rates acceptable. Continue ASA, Eliquis, amiodarone. Will f/u cardiology recs. 2. HTN - BPs ok, last elevated. Will follow for now on Cardizem. 3. DM2 - SSI. HgbA1c of 8.8%. Hold metformin, glipizide while in-house. 4. Asthma - mild, intermittent, as best as I can tell from review of records. No acute. Nebs prn. Fluticasone in place of Asmanex while here. 5. Hyperlipidemia -statin. 6. Severe dementia hx - noted. Unknown baseline. Is conversant and responsive, but confused. Outpt meds: Prozac, Seroquel, Risperdal.   7. Records reviewed. Last echo seen is from 2017: EF 60%, no regional wall motion abnml. Noted edema on admission. Continue Lasix, PO, and will f/u repeat echo.      Additional Notes:      Case discussed with:  []Patient  []Family  [x]Nursing  []Case Management  DVT Prophylaxis:  []Lovenox  []Hep SQ  []SCDs  []Coumadin   [x]On Eliquis    Objective:   VS:   Visit Vitals    /79 (BP 1 Location: Left arm, BP Patient Position: At rest)    Pulse (!) 121    Temp 97.3 °F (36.3 °C)    Resp 20    Ht 5' 5\" (1.651 m)    Wt 101.6 kg (224 lb)    SpO2 91%    BMI 37.28 kg/m2      Tmax/24hrs: Temp (24hrs), Av.5 °F (36.4 °C), Min:97.3 °F (36.3 °C), Max:97.6 °F (36.4 °C)    Intake/Output Summary (Last 24 hours) at 18 1330  Last data filed at 18 1152   Gross per 24 hour Intake                0 ml   Output              300 ml   Net             -300 ml       General:  Awake, conversant, pleasantly demented. NAD. Cardiovascular:  iRRR, tachy. Pulmonary:  CTA B ant.   GI:  Soft, NT/ND, NABS. Extremities:  No CT, 1+ BLE edema. Additional:      Labs:    Recent Results (from the past 24 hour(s))   CARDIAC PANEL,(CK, CKMB & TROPONIN)    Collection Time: 06/23/18  5:40 AM   Result Value Ref Range    CK 76 26 - 192 U/L    CK - MB 1.1 <3.6 ng/ml    CK-MB Index 1.4 0.0 - 4.0 %    Troponin-I, Qt. <0.02 0.0 - 0.045 NG/ML   HEMOGLOBIN A1C WITH EAG    Collection Time: 06/23/18  5:40 AM   Result Value Ref Range    Hemoglobin A1c 8.8 (H) 4.2 - 5.6 %    Est. average glucose 206 mg/dL   CBC WITH AUTOMATED DIFF    Collection Time: 06/23/18  5:40 AM   Result Value Ref Range    WBC 6.0 4.6 - 13.2 K/uL    RBC 3.26 (L) 4.20 - 5.30 M/uL    HGB 9.9 (L) 12.0 - 16.0 g/dL    HCT 29.6 (L) 35.0 - 45.0 %    MCV 90.8 74.0 - 97.0 FL    MCH 30.4 24.0 - 34.0 PG    MCHC 33.4 31.0 - 37.0 g/dL    RDW 15.0 (H) 11.6 - 14.5 %    PLATELET 355 489 - 688 K/uL    MPV 10.4 9.2 - 11.8 FL    NEUTROPHILS 77 (H) 40 - 73 %    LYMPHOCYTES 13 (L) 21 - 52 %    MONOCYTES 9 3 - 10 %    EOSINOPHILS 1 0 - 5 %    BASOPHILS 0 0 - 2 %    ABS. NEUTROPHILS 4.6 1.8 - 8.0 K/UL    ABS. LYMPHOCYTES 0.8 (L) 0.9 - 3.6 K/UL    ABS. MONOCYTES 0.6 0.05 - 1.2 K/UL    ABS. EOSINOPHILS 0.0 0.0 - 0.4 K/UL    ABS.  BASOPHILS 0.0 0.0 - 0.1 K/UL    DF AUTOMATED     METABOLIC PANEL, COMPREHENSIVE    Collection Time: 06/23/18  5:40 AM   Result Value Ref Range    Sodium 133 (L) 136 - 145 mmol/L    Potassium 3.5 3.5 - 5.5 mmol/L    Chloride 102 100 - 108 mmol/L    CO2 21 21 - 32 mmol/L    Anion gap 10 3.0 - 18 mmol/L    Glucose 240 (H) 74 - 99 mg/dL    BUN 14 7.0 - 18 MG/DL    Creatinine 0.93 0.6 - 1.3 MG/DL    BUN/Creatinine ratio 15 12 - 20      GFR est AA >60 >60 ml/min/1.73m2    GFR est non-AA 58 (L) >60 ml/min/1.73m2    Calcium 8.3 (L) 8.5 - 10.1 MG/DL    Bilirubin, total 0.6 0.2 - 1.0 MG/DL    ALT (SGPT) 25 13 - 56 U/L    AST (SGOT) 15 15 - 37 U/L    Alk.  phosphatase 80 45 - 117 U/L    Protein, total 6.5 6.4 - 8.2 g/dL    Albumin 3.0 (L) 3.4 - 5.0 g/dL    Globulin 3.5 2.0 - 4.0 g/dL    A-G Ratio 0.9 0.8 - 1.7     TSH 3RD GENERATION    Collection Time: 06/23/18  5:40 AM   Result Value Ref Range    TSH 1.38 0.36 - 3.74 uIU/mL   GLUCOSE, POC    Collection Time: 06/23/18  7:59 AM   Result Value Ref Range    Glucose (POC) 267 (H) 70 - 110 mg/dL   GLUCOSE, POC    Collection Time: 06/23/18 11:41 AM   Result Value Ref Range    Glucose (POC) 291 (H) 70 - 110 mg/dL   CARDIAC PANEL,(CK, CKMB & TROPONIN)    Collection Time: 06/23/18 12:00 PM   Result Value Ref Range     26 - 192 U/L    CK - MB 1.8 <3.6 ng/ml    CK-MB Index 1.6 0.0 - 4.0 %    Troponin-I, Qt. 0.03 0.0 - 0.045 NG/ML   ECHO ADULT COMPLETE    Collection Time: 06/23/18 12:23 PM   Result Value Ref Range    LVIDd 5.22 3.9 - 5.3 cm    LVPWd 1.52 (A) 0.6 - 0.9 cm    LVIDs 4.80 cm    IVSd 1.42 (A) 0.6 - 0.9 cm    LVOT d 1.84 cm    LA Vol 4C 37.37 22 - 52 mL    LA Vol 2C 50.60 22 - 52 mL    LA Area 4C 16.6 cm2    LV Mass .4 (A) 67 - 162 g    LV Mass AL Index 155.2 (A) g/m2    Tapse 1.08 (A) 1.5 - 2.0 cm    Triscuspid Valve Regurgitation Peak Gradient 25.0 mmHg    TR Max Velocity 249.86 cm/s    LA Vol Index 24.38 ml/m2    LA Vol Index 18.00 ml/m2    Ao Root D 3.06 cm       Signed By: Jace Boas, MD     June 23, 2018 1:30 PM

## 2018-06-23 NOTE — IP AVS SNAPSHOT
[id number] [address] Patient: Darshan Glass MRN: [id number] About your hospitalization You were admitted on:  June 23, 2018 You last received care in the:  [address] [place] [address] You were discharged on:  June 26, 2018 Why you were hospitalized Your primary diagnosis was:  Atrial Fibrillation With Rvr (Hcc) Your diagnoses also included:  Chronic Anticoagulation, Lower Extremity Edema, Psychiatric Disorder Follow-up Information Follow up With Details Comments Contact Info Edyta Amin MD Schedule an appointment as soon as possible for a visit in 3 weeks  [address] [facility] [address] [facility] 
[phone] Dell Rousseau MD   [facility] [id number] [facility] [id number] [phone] Discharge Orders None A check den indicates which time of day the medication should be taken. My Medications START taking these medications Instructions Each Dose to Equal  
 Morning Noon Evening Bedtime  
 famotidine 20 mg tablet Commonly known as:  PEPCID Your last dose was: Your next dose is: Take 1 Tab by mouth nightly. Indications: gastroesophageal reflux disease 20 mg  
    
   
   
   
  
 furosemide 20 mg tablet Commonly known as:  LASIX Your last dose was: Your next dose is: Take 1 Tab by mouth daily as needed. Indications: Edema 20 mg  
    
   
   
   
  
 insulin lispro 100 unit/mL injection Commonly known as:  HUMALOG Your last dose was: Your next dose is:    
   
   
 SubCUTAneous route Before breakfast, lunch, dinner and at bedtime.   For Blood Sugar (mg/dL) of:    Less than 150 =   0 units          150 -199 =   2 units 200 -249 =   4 units 250 -299 =   6 units 300 -349 =   8 units 350 and above =  10 units  Indications: type 2 diabetes mellitus  
     
   
   
   
  
 metoprolol tartrate 25 mg tablet Commonly known as:  LOPRESSOR Your last dose was: Your next dose is: Take 1 Tab by mouth every twelve (12) hours. Indications: VENTRICULAR RATE CONTROL IN ATRIAL FIBRILLATION  
 25 mg  
    
   
   
   
  
 predniSONE 20 mg tablet Commonly known as:  Garrel Moron Your last dose was: Your next dose is: Take 1 Tab by mouth daily for 5 days. Indications: Asthma Exacerbation 20 mg CHANGE how you take these medications Instructions Each Dose to Equal  
 Morning Noon Evening Bedtime  
 acetaminophen 325 mg tablet Commonly known as:  TYLENOL What changed:  when to take this Your last dose was: Your next dose is: Take 2 Tabs by mouth every six (6) hours as needed for Pain or Fever. Indications: Arthritic Pain, Fever 650 mg  
    
   
   
   
  
 amiodarone 200 mg tablet Commonly known as:  CORDARONE What changed:  when to take this Your last dose was: Your next dose is: Take 1 Tab by mouth daily. Indications: Cardioversion of Atrial Fibrillation 200 mg  
    
   
   
   
  
 apixaban 5 mg tablet Commonly known as:  Farheen Elizondo What changed:  when to take this Your last dose was: Your next dose is: Take 1 Tab by mouth two (2) times a day. Indications: Cerebral Thromboembolism Prevention 5 mg FLUoxetine 20 mg tablet Commonly known as:  PROzac What changed:  when to take this Your last dose was: Your next dose is: Take 2 Tabs by mouth nightly. Indications: ANXIETY WITH DEPRESSION 40 mg  
    
   
   
   
  
 losartan 25 mg tablet Commonly known as:  COZAAR What changed:   
- medication strength 
- how much to take Your last dose was: Your next dose is: Take 1 Tab by mouth daily. Indications: hypertension 25 mg  
    
   
   
   
  
 mometasone 220 mcg (120 doses) Aepb inhaler Commonly known as:  Patti Tipton What changed:  when to take this Your last dose was: Your next dose is: Take 1 Puff by inhalation daily. Powder that patient inhales. 220mcg. Indications: MAINTENANCE THERAPY FOR ASTHMA  
 1 Puff * risperiDONE 1 mg tablet Commonly known as:  RisperDAL What changed:  when to take this Your last dose was: Your next dose is: Take 1 Tab by mouth nightly. 1 mg * risperiDONE 1 mg tablet Commonly known as:  RisperDAL What changed: You were already taking a medication with the same name, and this prescription was added. Make sure you understand how and when to take each. Your last dose was: Your next dose is: Take 1 Tab by mouth nightly. Indications: BIPOLAR DISORDER IN REMISSION, DEPRESSION ASSOCIATED WITH BIPOLAR DISORDER  
 1 mg * Notice: This list has 2 medication(s) that are the same as other medications prescribed for you. Read the directions carefully, and ask your doctor or other care provider to review them with you. CONTINUE taking these medications Instructions Each Dose to Equal  
 Morning Noon Evening Bedtime * albuterol 90 mcg/actuation inhaler Commonly known as:  PROVENTIL HFA, VENTOLIN HFA, PROAIR HFA Your last dose was: Your next dose is: Take 1 Puff by inhalation every six (6) hours as needed (Asthma/cough). Indications: Acute Asthma Attack 1 Puff * albuterol 2.5 mg /3 mL (0.083 %) nebulizer solution Commonly known as:  PROVENTIL VENTOLIN Your last dose was: Your next dose is:    
   
   
 3 mL by Nebulization route every four (4) hours as needed for Wheezing. Indications: Chronic Obstructive Pulmonary Disease 2.5 mg  
    
   
   
   
  
 aspirin delayed-release 81 mg tablet Your last dose was: Your next dose is: Take 1 Tab by mouth daily. Indications: CAD  
 81 mg  
    
   
   
   
  
 cholecalciferol 1,000 unit Cap Commonly known as:  VITAMIN D3 Your last dose was: Your next dose is: Take 2 Caps by mouth daily. Daughter is unsure about the dosage. Indications: Osteoporosis, Vitamin D Deficiency 2000 Units  
    
   
   
   
  
 glipiZIDE SR 10 mg CR tablet Commonly known as:  GLUCOTROL XL Your last dose was: Your next dose is: Take 1 Tab by mouth two (2) times a day. 1 in the morning and 1 at bedtime. Indications: type 2 diabetes mellitus 10 mg  
    
   
   
   
  
 Menthol-Zinc Oxide 0.44-20.6 % Oint Commonly known as:  [place] Bety Your last dose was: Your next dose is:    
   
   
 Apply 1 g to affected area three (3) times daily as needed (For rash, apply to inner buttocks). Indications: Diaper Rash 1 g  
    
   
   
   
  
 metFORMIN 1,000 mg tablet Commonly known as:  GLUCOPHAGE Your last dose was: Your next dose is: Take 1 Tab by mouth two (2) times daily (with meals). Indications: type 2 diabetes mellitus 1000 mg  
    
   
   
   
  
 montelukast 10 mg tablet Commonly known as:  SINGULAIR Your last dose was: Your next dose is: Take 1 Tab by mouth daily. Indications: MAINTENANCE THERAPY FOR ASTHMA 10 mg  
    
   
   
   
  
 polyethylene glycol 17 gram/dose powder Commonly known as:  Almeta Lauth Your last dose was: Your next dose is: Take 17 g by mouth daily as needed. Indications: constipation 17 g  
    
   
   
   
  
 prednisoLONE acetate 1 % ophthalmic suspension Commonly known as:  PRED FORTE Your last dose was: Your next dose is:    
   
   
 Administer 1 Drop to left eye two (2) times a day. Please verify with  Patient's daughter the percentage. Thanks,   Indications: PREVENTION OF CYTARABINE-INDUCED KERATOCONJUNCTIVITIS  
 1 Drop QUEtiapine 25 mg tablet Commonly known as:  SEROquel Your last dose was: Your next dose is: Take 0.5 Tabs by mouth two (2) times a day. Indications: Generalized Anxiety Disorder 12.5 mg  
    
   
   
   
  
 simvastatin 10 mg tablet Commonly known as:  ZOCOR Your last dose was: Your next dose is: Take 1 Tab by mouth nightly. Indications: hypertriglyceridemia 10 mg  
    
   
   
   
  
 * Notice: This list has 2 medication(s) that are the same as other medications prescribed for you. Read the directions carefully, and ask your doctor or other care provider to review them with you. STOP taking these medications   
 acetaminophen-codeine 300-30 mg per tablet Commonly known as:  TYLENOL-CODEINE #3  
   
  
 glucagon 1 mg injection Commonly known as:  GLUCAGEN  
   
  
 NORVASC 10 mg tablet Generic drug:  amLODIPine Where to Get Your Medications Information on where to get these meds will be given to you by the nurse or doctor. ! Ask your nurse or doctor about these medications  
  acetaminophen 325 mg tablet  
 albuterol 2.5 mg /3 mL (0.083 %) nebulizer solution  
 albuterol 90 mcg/actuation inhaler  
 amiodarone 200 mg tablet  
 apixaban 5 mg tablet  
 aspirin delayed-release 81 mg tablet  
 cholecalciferol 1,000 unit Cap  
 famotidine 20 mg tablet FLUoxetine 20 mg tablet  
 furosemide 20 mg tablet  
 glipiZIDE SR 10 mg CR tablet  
 insulin lispro 100 unit/mL injection  
 losartan 25 mg tablet Menthol-Zinc Oxide 0.44-20.6 % Oint  
 metFORMIN 1,000 mg tablet  
 metoprolol tartrate 25 mg tablet  
 mometasone 220 mcg (120 doses) Aepb inhaler  
 montelukast 10 mg tablet polyethylene glycol 17 gram/dose powder  
 prednisoLONE acetate 1 % ophthalmic suspension  
 predniSONE 20 mg tablet QUEtiapine 25 mg tablet  
 risperiDONE 1 mg tablet  
 risperiDONE 1 mg tablet  
 simvastatin 10 mg tablet Discharge Instructions DISCHARGE SUMMARY from Nurse PATIENT INSTRUCTIONS: 
 
 
F-face looks uneven A-arms unable to move or move unevenly S-speech slurred or non-existent T-time-call 911 as soon as signs and symptoms begin-DO NOT go Back to bed or wait to see if you get better-TIME IS BRAIN. Warning Signs of HEART ATTACK Call 911 if you have these symptoms: 
? Chest discomfort. Most heart attacks involve discomfort in the center of the chest that lasts more than a few minutes, or that goes away and comes back. It can feel like uncomfortable pressure, squeezing, fullness, or pain. ? Discomfort in other areas of the upper body. Symptoms can include pain or discomfort in one or both arms, the back, neck, jaw, or stomach. ? Shortness of breath with or without chest discomfort. ? Other signs may include breaking out in a cold sweat, nausea, or lightheadedness. Don't wait more than five minutes to call [address]! Fast action can save your life. Calling 911 is almost always the fastest way to get lifesaving treatment. Emergency Medical Services staff can begin treatment when they arrive  up to an hour sooner than if someone gets to the hospital by car. The discharge information has been reviewed with the patient. The patient verbalized understanding. Discharge medications reviewed with the patient and appropriate educational materials and side effects teaching were provided. MyChart Activation Thank you for requesting access to [employer]. Please follow the instructions below to securely access and download your online medical record. [employer] allows you to send messages to your doctor, view your test results, renew your prescriptions, schedule appointments, and more. How Do I Sign Up? 1. In your internet browser, go to [email or web address] 
2. Click on the First Time User? Click Here link in the Sign In box. You will be redirect to the New Member Sign Up page. 3. Enter your [employer] Access Code exactly as it appears below. You will not need to use this code after youve completed the sign-up process. If you do not sign up before the expiration date, you must request a new code. [employer]os Access Code: [email or web address] Expires: 2018  6:05 PM (This is the date your [employer] access code will ) 4. Enter the last four digits of your Social Security Number (xxxx) and Date of Birth (mm/dd/yyyy) as indicated and click Submit. You will be taken to the next sign-up page. 5. Create a Zappos ID. This will be your Z[employer]os login ID and cannot be changed, so think of one that is secure and easy to remember. 6. Create a Z[employer]os password. You can change your password at any time. 7. Enter your Password Reset Question and Answer. This can be used at a later time if you forget your password. 8. Enter your e-mail address. You will receive e-mail notification when new information is available in [address] 9. Click Sign Up. You can now view and download portions of your medical record. 10. Click the Download Summary menu link to download a portable copy of your medical information. Additional Information If you have questions, please visit the Frequently Asked Questions section of the [employer] website at [email or web address] Remember, [employer] is NOT to be used for urgent needs. For medical emergencies, dial 911. Patient armband removed and shredded ___________________________________________________________________________________________________________________________________ MyChart Announcement We are excited to announce that we are making your provider's discharge notes available to you in [employer]. You will see these notes when they are completed and signed by the physician that discharged you from your recent hospital stay. If you have any questions or concerns about any information you see in [employer], please call the Health Information Department where you were seen or reach out to your Primary Care Provider for more information about your plan of care. Introducing [facility]! [employer] introduces [employer] patient portal. Now you can access parts of your medical record, email your doctor's office, and request medication refills online. 1. In your internet browser, go to [email or web address] 2. Click on the First Time User? Click Here link in the Sign In box. You will see the New Member Sign Up page. 3. Enter your [employer] Access Code exactly as it appears below. You will not need to use this code after youve completed the sign-up process. If you do not sign up before the expiration date, you must request a new code. · [employer] Access Code: [email or web address] Expires: 9/24/2018  6:05 PM 
 
4. Enter the last four digits of your Social Security Number (xxxx) and Date of Birth (mm/dd/yyyy) as indicated and click Submit. You will be taken to the next sign-up page. 5. Create a [employer] ID. This will be your [employer] login ID and cannot be changed, so think of one that is secure and easy to remember. 6. Create a Purcht password. You can change your password at any time. 7. Enter your Password Reset Question and Answer. This can be used at a later time if you forget your password. 8. Enter your e-mail address. You will receive e-mail notification when new information is available in [address] 9. Click Sign Up. You can now view and download portions of your medical record. 10. Click the Download Summary menu link to download a portable copy of your medical information. If you have questions, please visit the Frequently Asked Questions section of the [employer] (P)t website. Remember, [employer] is NOT to be used for urgent needs. For medical emergencies, dial 911. Now available from your iPhone and Android! Introducing Junito Phillips As a [facility] patient, I wanted to make you aware of our electronic visit tool called Junito Phillips. [facility] 24/7 allows you to connect within minutes with a medical provider 24 hours a day, seven days a week via a mobile device or tablet or logging into a secure website from your computer. You can access Junito Phillips from anywhere in the [place]. A virtual visit might be right for you when you have a simple condition and feel like you just dont want to get out of bed, or cant get away from work for an appointment, when your regular [facility] provider is not available (evenings, weekends or holidays), or when youre out of town and need minor care. Electronic visits cost only $49 and if the [facility] 24/7 provider determines a prescription is needed to treat your condition, one can be electronically transmitted to a nearby pharmacy*. Please take a moment to enroll today if you have not already done so. The enrollment process is free and takes just a few minutes. To enroll, please download the [facility] 24/7 maxime to your tablet or phone, or visit [email or web address] to enroll on your computer. And, as an [address] [place] [address] patient with a [employer] account, the results of your visits will be scanned into your electronic medical record and your primary care provider will be able to view the scanned results.    
We urge you to continue to see your regular [facility] provider for your ongoing medical care. And while your primary care provider may not be the one available when you seek a Junito Phillips virtual visit, the peace of mind you get from getting a real diagnosis real time can be priceless. For more information on Junito Jacqueline, view our Frequently Asked Questions (FAQs) at [email or web address]. Sincerely, 
 
Raquel Jacobsen MD 
Chief Medical Officer Al Mcintosh *:  certain medications cannot be prescribed via Junito Phillips Unresulted tests-please follow up with your PCP on these results Procedure/Test Authorizing Provider CARDIAC PANEL,(CK, CKMB & TROPONIN) Felicity Mcleod MD  
 CARDIAC PANEL,(CK, CKMB & TROPONIN) Felicity Mcleod MD  
 CBC WITH AUTOMATED DIFF Felicity Mcleod, MD  
 CBC WITH AUTOMATED DIFF Felicity Mcleod MD  
 HEMOGLOBIN A1C WITH MD Lilly Mason Mai, MD  
 METABOLIC PANEL, Abhinav Goode MD  
 METABOLIC PANEL, MD Radhika Harris MD  
 TSH 3RD Mary Ann Dodge MD  
 XR Toan Morgan MD  
  
Providers Seen During Your Hospitalization Provider Specialty Primary office phone Felicity Mcleod MD Hospitalist [phone] Olga Vincent MD [facility] [phone] Lg Ramirez MD Internal Medicine [phone] Your Primary Care Physician (PCP) Primary Care Physician Office Phone Office Fax [facility], [facility] [phone] You are allergic to the following Allergen Reactions Compazine (Prochlorperazine Edisylate) Other (comments) Twitching, seizure-like symptoms Recent Documentation Height Weight BMI OB Status Smoking Status 1.651 m 102.1 kg 37.44 kg/m2 Postmenopausal Never Smoker Emergency Contacts Name Discharge Info Relation Home Work Mobile Orville Dandy DISCHARGE CAREGIVER [3] Daughter [21] [phone] Anna Wolff DISCHARGE CAREGIVER [3] Child [2] [phone] Arnulfo Rack  Daughter [21] [phone] Patient Belongings The following personal items are in your possession at time of discharge: 
  Dental Appliances: None         Home Medications: None   Jewelry: Watch, With patient  Clothing: Pants Please provide this summary of care documentation to your next provider. Signatures-by signing, you are acknowledging that this After Visit Summary has been reviewed with you and you have received a copy. Patient Signature:  ____________________________________________________________ Date:  ____________________________________________________________  
  
Arna Aleks Provider Signature:  ____________________________________________________________ Date:  ____________________________________________________________

## 2018-06-23 NOTE — H&P
History & Physical    Patient: Ezell Runner MRN: [id number]  CSN: [id number]    YOB: 1940  Age: 66 y.o. Sex: female      DOA: 6/23/2018    Chief Complaint: No chief complaint on file. HPI:     Ezell Runner is a 66 y.o.  female NH resident with PMH of A-fib s/p cardioversion 01/2017 and regular echo, chronic anticoagulation, severe dementia. Pt was in her NH when she started to c/o Chest tightness and pain that radiated to both her shoulders as per nursing staff in NH and had tachycardia   Pt was sent to [facility] center ER where she was found to have A-fib with RVR. Pt continued to c/o chest pain and SOB. Pt was given Cardizem 10 mg x 1 then metoprolol 5 mg IV x 1 but Pt continued to have A-fib with RVR and Pt was started on Cardizem drip  Pt's family was contacted and asked for Pt to be transferred to [facility] as all her records and cardiologist are here. Pt is seen and examined on arrival, HR is more controlled and will switches to PO meds. Denies CP and SOB currently but very anxious and starts screaming when left alone   Pt calms down when we enter the room and complaints of itching in her feet.     Past Medical History:   Diagnosis Date    Anemia NEC     Arthritis     Arthropathy, unspecified, site unspecified     Asthma     Atrial fibrillation (HCC)     CHRONIC A-FIB    Diabetes mellitus     Essential hypertension     Heart abnormalities     irregular heartbeat    Hypertension     Liver disease     steatohepatitis    Neurological disorder     dementia    Osteoarthritis     LEFT HIP       Past Surgical History:   Procedure Laterality Date    Jina Edmundo HIP REPLACEMENT  11/2014    HX OTHER SURGICAL  5/27/12    colon mass removed    HX TUBAL LIGATION         Family History   Problem Relation Age of Onset    Hypertension Mother     Diabetes Father     Diabetes Daughter        Social History     Social History    Marital status:      Spouse name: N/A    Number of children: N/A    Years of education: N/A     Social History Main Topics    Smoking status: Never Smoker    Smokeless tobacco: Never Used    Alcohol use No    Drug use: No    Sexual activity: No     Other Topics Concern    Not on file     Social History Narrative       Prior to Admission medications    Medication Sig Start Date End Date Taking? Authorizing Provider   glipiZIDE SR (GLUCOTROL XL) 10 mg CR tablet Take 10 mg by mouth two (2) times a day. 1 in the morning and 1 at bedtime. Historical Provider   acetaminophen (TYLENOL) 325 mg tablet Take 650 mg by mouth every four (4) hours as needed for Pain or Fever. Historical Provider   Menthol-Zinc Oxide (CALMOSEPTINE) 0.44-20.6 % oint Apply 1 g to affected area three (3) times daily as needed (For rash, apply to inner buttocks). Historical Provider   montelukast (SINGULAIR) 10 mg tablet Take 10 mg by mouth daily. Historical Provider   cholecalciferol (VITAMIN D3) 1,000 unit cap Take 2,000 Units by mouth daily. Daughter is unsure about the dosage. Erin Fischer MD   albuterol (PROVENTIL HFA, VENTOLIN HFA, PROAIR HFA) 90 mcg/actuation inhaler Take 1 Puff by inhalation every six (6) hours as needed (Asthma/cough). Historical Provider   risperiDONE (RISPERDAL) 1 mg tablet Take 1 mg by mouth two (2) times a day. Historical Provider   acetaminophen-codeine (TYLENOL-CODEINE #3) 300-30 mg per tablet Take 1 Tab by mouth every six (6) hours as needed (for moderate to severe pain only. caution can cause drowsiness). Max Daily Amount: 4 Tabs. 12/21/17   Franko Talbert MD   amiodarone (CORDARONE) 200 mg tablet Take 200 mg by mouth. Erin Fischer MD   amLODIPine (NORVASC) 10 mg tablet Take 10 mg by mouth daily. Erin Fischer MD   losartan (COZAAR) 50 mg tablet Take 50 mg by mouth daily.     Erin Fischer MD   albuterol (PROVENTIL VENTOLIN) 2.5 mg /3 mL (0.083 %) nebulizer solution 3 mL by Nebulization route every four (4) hours as needed for Wheezing. 17   Jose Robin PA-C   glucagon (GLUCAGEN) 1 mg injection 1 mL by IntraMUSCular route as needed for Hypoglycemia. 3/6/17   Martha Stewart MD   apixaban (ELIQUIS) 5 mg tablet Take 1 Tab by mouth two (2) times a day. Patient taking differently: Take 1 Tab by mouth daily. 17   Ana Rowley DO   QUEtiapine (SEROQUEL) 25 mg tablet Take 12.5 mg by mouth two (2) times a day. Historical Provider   polyethylene glycol (MIRALAX) 17 gram/dose powder Take 17 g by mouth daily as needed. Indications: Constipation    Historical Provider   prednisoLONE acetate (PRED FORTE) 1 % ophthalmic suspension Administer 1 Drop to left eye two (2) times a day. Please verify with  Patient's daughter the percentage. Thanks,    Erin Fischer MD   FLUoxetine (PROZAC) 20 mg tablet Take 40 mg by mouth daily. Erin Fischer MD   aspirin delayed-release 81 mg tablet Take 81 mg by mouth daily. Historical Provider   [facility]) 220 mcg (120 doses) AePB inhaler Take 1 Puff by inhalation. Powder that patient inhales. 220mcg. Indications: MAINTENANCE THERAPY FOR ASTHMA    Historical Provider   simvastatin (ZOCOR) 10 mg tablet Take 10 mg by mouth nightly. Historical Provider   metFORMIN (GLUCOPHAGE) 1,000 mg tablet Take 1,000 mg by mouth two (2) times daily (with meals). Historical Provider       Allergies   Allergen Reactions    Compazine [Prochlorperazine Edisylate] Other (comments)     Twitching, seizure-like symptoms         Review of Systems  GENERAL: Patient alert, not oriented >> unable to obtain  Severely demented       Physical Exam:     Physical Exam:  Visit Vitals    /75    Pulse 95    Temp 97.5 °F (36.4 °C)    Resp 20    SpO2 97%           Temp (24hrs), Av.5 °F (36.4 °C), Min:97.5 °F (36.4 °C), Max:97.5 °F (36.4 °C)             General:  Alert, cooperative, no distress, appears stated age.               Head: Normocephalic, without obvious abnormality, atraumatic. Eyes:  Conjunctivae/corneas clear. PERRL, EOMs intact. Nose: Nares normal. No drainage or sinus tenderness. Neck: Supple, symmetrical, trachea midline, no adenopathy, thyroid: no enlargement, no carotid bruit and no JVD. Lungs:   Decrease BS B/l with Bibasilar crackles    Heart:  Irregular rate and rhythm, S1, S2 tachy     Abdomen: Soft, non-tender. Bowel sounds normal.    Extremities: Extremities normal, atraumatic, no cyanosis. +2 LE edema. Pulses: 2+ and symmetric all extremities. Skin:  No rashes or lesions. itching Rt foot    Neurologic: Alert not oriented. Mostly bed bound        Labs Reviewed: All lab results for the last 24 hours reviewed.   CXR and EKG    Procedures/imaging: see electronic medical records for all procedures/Xrays and details which were not copied into this note but were reviewed prior to creation of Plan      Assessment/Plan     Principal Problem:    Atrial fibrillation with RVR ([address] (6/23/2018)    Active Problems:    Chronic anticoagulation (6/23/2018)      Lower extremity edema (6/23/2018)      Psychiatric disorder (6/23/2018)       Pt is admitted for Paroxysmal a-fib with RVR associated with Chest pain  Chest pain is supply and demand Vs ischemic episode     Cardiology consult in AM  Continue Cardizem drip and Start PO  Continue Amiodarone  Continue Eliquis  Series of cardiac enzymes   2 D echo  TSH   Will start Pt on lasix 20 mg BID    Continue Psych home meds     DVT/GI Prophylaxis: Eliquis    Plan of care is discussed in details with Patient/Family at bedside and agreed upon    Katlin Wang MD  6/23/2018 6:00 AM

## 2018-06-23 NOTE — IP AVS SNAPSHOT
Anthony Laws 
 
 
 [address] Patient: Bruno Hayes MRN: [id number] QZP:6/83/3401 A check den indicates which time of day the medication should be taken. My Medications START taking these medications Instructions Each Dose to Equal  
 Morning Noon Evening Bedtime  
 famotidine 20 mg tablet Commonly known as:  PEPCID Your last dose was: Your next dose is: Take 1 Tab by mouth nightly. Indications: gastroesophageal reflux disease 20 mg  
    
   
   
   
  
 furosemide 20 mg tablet Commonly known as:  LASIX Your last dose was: Your next dose is: Take 1 Tab by mouth daily as needed. Indications: Edema 20 mg  
    
   
   
   
  
 insulin lispro 100 unit/mL injection Commonly known as:  HUMALOG Your last dose was: Your next dose is:    
   
   
 SubCUTAneous route Before breakfast, lunch, dinner and at bedtime. For Blood Sugar (mg/dL) of:    Less than 150 =   0 units          150 -199 =   2 units 200 -249 =   4 units 250 -299 =   6 units 300 -349 =   8 units 350 and above =  10 units  Indications: type 2 diabetes mellitus  
     
   
   
   
  
 metoprolol tartrate 25 mg tablet Commonly known as:  LOPRESSOR Your last dose was: Your next dose is: Take 1 Tab by mouth every twelve (12) hours. Indications: VENTRICULAR RATE CONTROL IN ATRIAL FIBRILLATION  
 25 mg  
    
   
   
   
  
 predniSONE 20 mg tablet Commonly known as:  Apollo Wrightin Your last dose was: Your next dose is: Take 1 Tab by mouth daily for 5 days. Indications: Asthma Exacerbation 20 mg CHANGE how you take these medications Instructions Each Dose to Equal  
 Morning Noon Evening Bedtime  
 acetaminophen 325 mg tablet Commonly known as:  TYLENOL What changed:  when to take this Your last dose was: Your next dose is: Take 2 Tabs by mouth every six (6) hours as needed for Pain or Fever. Indications: Arthritic Pain, Fever 650 mg  
    
   
   
   
  
 amiodarone 200 mg tablet Commonly known as:  CORDARONE What changed:  when to take this Your last dose was: Your next dose is: Take 1 Tab by mouth daily. Indications: Cardioversion of Atrial Fibrillation 200 mg  
    
   
   
   
  
 apixaban 5 mg tablet Commonly known as:  Мария Roberts What changed:  when to take this Your last dose was: Your next dose is: Take 1 Tab by mouth two (2) times a day. Indications: Cerebral Thromboembolism Prevention 5 mg FLUoxetine 20 mg tablet Commonly known as:  PROzac What changed:  when to take this Your last dose was: Your next dose is: Take 2 Tabs by mouth nightly. Indications: ANXIETY WITH DEPRESSION 40 mg  
    
   
   
   
  
 losartan 25 mg tablet Commonly known as:  COZAAR What changed:   
- medication strength 
- how much to take Your last dose was: Your next dose is: Take 1 Tab by mouth daily. Indications: hypertension 25 mg  
    
   
   
   
  
 mometasone 220 mcg (120 doses) Aepb inhaler Commonly known as:  Santi Sit What changed:  when to take this Your last dose was: Your next dose is: Take 1 Puff by inhalation daily. Powder that patient inhales. 220mcg. Indications: MAINTENANCE THERAPY FOR ASTHMA  
 1 Puff * risperiDONE 1 mg tablet Commonly known as:  RisperDAL What changed:  when to take this Your last dose was: Your next dose is: Take 1 Tab by mouth nightly. 1 mg * risperiDONE 1 mg tablet Commonly known as:  RisperDAL What changed:   You were already taking a medication with the same name, and this prescription was added. Make sure you understand how and when to take each. Your last dose was: Your next dose is: Take 1 Tab by mouth nightly. Indications: BIPOLAR DISORDER IN REMISSION, DEPRESSION ASSOCIATED WITH BIPOLAR DISORDER  
 1 mg * Notice: This list has 2 medication(s) that are the same as other medications prescribed for you. Read the directions carefully, and ask your doctor or other care provider to review them with you. CONTINUE taking these medications Instructions Each Dose to Equal  
 Morning Noon Evening Bedtime * albuterol 90 mcg/actuation inhaler Commonly known as:  PROVENTIL HFA, VENTOLIN HFA, PROAIR HFA Your last dose was: Your next dose is: Take 1 Puff by inhalation every six (6) hours as needed (Asthma/cough). Indications: Acute Asthma Attack 1 Puff * albuterol 2.5 mg /3 mL (0.083 %) nebulizer solution Commonly known as:  PROVENTIL VENTOLIN Your last dose was: Your next dose is:    
   
   
 3 mL by Nebulization route every four (4) hours as needed for Wheezing. Indications: Chronic Obstructive Pulmonary Disease 2.5 mg  
    
   
   
   
  
 aspirin delayed-release 81 mg tablet Your last dose was: Your next dose is: Take 1 Tab by mouth daily. Indications: CAD  
 81 mg  
    
   
   
   
  
 cholecalciferol 1,000 unit Cap Commonly known as:  VITAMIN D3 Your last dose was: Your next dose is: Take 2 Caps by mouth daily. Daughter is unsure about the dosage. Indications: Osteoporosis, Vitamin D Deficiency 2000 Units  
    
   
   
   
  
 glipiZIDE SR 10 mg CR tablet Commonly known as:  GLUCOTROL XL Your last dose was: Your next dose is: Take 1 Tab by mouth two (2) times a day. 1 in the morning and 1 at bedtime. Indications: type 2 diabetes mellitus  10 mg  
 Menthol-Zinc Oxide 0.44-20.6 % Oint Commonly known as:  Nayely Perez Your last dose was: Your next dose is:    
   
   
 Apply 1 g to affected area three (3) times daily as needed (For rash, apply to inner buttocks). Indications: Diaper Rash 1 g  
    
   
   
   
  
 metFORMIN 1,000 mg tablet Commonly known as:  GLUCOPHAGE Your last dose was: Your next dose is: Take 1 Tab by mouth two (2) times daily (with meals). Indications: type 2 diabetes mellitus 1000 mg  
    
   
   
   
  
 montelukast 10 mg tablet Commonly known as:  SINGULAIR Your last dose was: Your next dose is: Take 1 Tab by mouth daily. Indications: MAINTENANCE THERAPY FOR ASTHMA 10 mg  
    
   
   
   
  
 polyethylene glycol 17 gram/dose powder Commonly known as:  Marlane Blinks Your last dose was: Your next dose is: Take 17 g by mouth daily as needed. Indications: constipation 17 g  
    
   
   
   
  
 prednisoLONE acetate 1 % ophthalmic suspension Commonly known as:  PRED FORTE Your last dose was: Your next dose is:    
   
   
 Administer 1 Drop to left eye two (2) times a day. Please verify with  Patient's daughter the percentage. Thanks,   Indications: PREVENTION OF CYTARABINE-INDUCED KERATOCONJUNCTIVITIS  
 1 Drop QUEtiapine 25 mg tablet Commonly known as:  SEROquel Your last dose was: Your next dose is: Take 0.5 Tabs by mouth two (2) times a day. Indications: Generalized Anxiety Disorder 12.5 mg  
    
   
   
   
  
 simvastatin 10 mg tablet Commonly known as:  ZOCOR Your last dose was: Your next dose is: Take 1 Tab by mouth nightly. Indications: hypertriglyceridemia 10 mg  
    
   
   
   
  
 * Notice:   This list has 2 medication(s) that are the same as other medications prescribed for you. Read the directions carefully, and ask your doctor or other care provider to review them with you. STOP taking these medications   
 acetaminophen-codeine 300-30 mg per tablet Commonly known as:  TYLENOL-CODEINE #3  
   
  
 glucagon 1 mg injection Commonly known as:  GLUCAGEN  
   
  
 NORVASC 10 mg tablet Generic drug:  amLODIPine Where to Get Your Medications Information on where to get these meds will be given to you by the nurse or doctor. ! Ask your nurse or doctor about these medications  
  acetaminophen 325 mg tablet  
 albuterol 2.5 mg /3 mL (0.083 %) nebulizer solution  
 albuterol 90 mcg/actuation inhaler  
 amiodarone 200 mg tablet  
 apixaban 5 mg tablet  
 aspirin delayed-release 81 mg tablet  
 cholecalciferol 1,000 unit Cap  
 famotidine 20 mg tablet FLUoxetine 20 mg tablet  
 furosemide 20 mg tablet  
 glipiZIDE SR 10 mg CR tablet  
 insulin lispro 100 unit/mL injection  
 losartan 25 mg tablet Menthol-Zinc Oxide 0.44-20.6 % Oint  
 metFORMIN 1,000 mg tablet  
 metoprolol tartrate 25 mg tablet  
 mometasone 220 mcg (120 doses) Aepb inhaler  
 montelukast 10 mg tablet  
 polyethylene glycol 17 gram/dose powder  
 prednisoLONE acetate 1 % ophthalmic suspension  
 predniSONE 20 mg tablet QUEtiapine 25 mg tablet  
 risperiDONE 1 mg tablet  
 risperiDONE 1 mg tablet  
 simvastatin 10 mg tablet

## 2018-06-24 NOTE — PROGRESS NOTES
Problem: Falls - Risk of  Goal: *Absence of Falls  Document Dipesh Fall Risk and appropriate interventions in the flowsheet.    Outcome: Progressing Towards Goal  Fall Risk Interventions:       Mentation Interventions: Adequate sleep, hydration, pain control    Medication Interventions: Bed/chair exit alarm    Elimination Interventions: Patient to call for help with toileting needs, Call light in reach, Bed/chair exit alarm

## 2018-06-24 NOTE — PROGRESS NOTES
Problem: Pressure Injury - Risk of  Goal: *Prevention of pressure injury  Document Julián Scale and appropriate interventions in the flowsheet.    Outcome: Progressing Towards Goal  Pressure Injury Interventions:  Sensory Interventions: Assess changes in LOC    Moisture Interventions: Absorbent underpads, Maintain skin hydration (lotion/cream)    Activity Interventions: Pressure redistribution bed/mattress(bed type)    Mobility Interventions: Pressure redistribution bed/mattress (bed type), PT/OT evaluation    Nutrition Interventions: Document food/fluid/supplement intake    Friction and Shear Interventions: Apply protective barrier, creams and emollients

## 2018-06-24 NOTE — ROUTINE PROCESS
Bedside and Verbal shift change report given to Benedict Joy (oncoming nurse) by Carlyle Feng RN (offgoing nurse). Report included the following information SBAR, Kardex, MAR and Recent Results.       Carlyle Feng RN

## 2018-06-24 NOTE — DISCHARGE SUMMARY
[address] [facility] [address] [place]  MR#: [id number]  : 1940  ACCOUNT #: [de-identified]   ADMIT DATE: 2018  DISCHARGE DATE:     TRANSFER SUMMARY    Please note this is a preliminary summary dictated for continuity as I anticipate the patient will be transferred tomorrow and she will be undergoing change of service. TRANSFER DIAGNOSES:    1. Atrial fibrillation with rapid ventricular response. 2.  Hypertension. 3.  Type 2 diabetes mellitus. 4.  Mildly, intermittent asthma. 5.  Hyperlipidemia. 6.  Severe dementia. SERVICE:  The patient was admitted to the hospitalist service. CONSULTATIONS:  Dr. Tennille Quiñones was consulted for cardiology. PROCEDURES:  None. HISTORY OF PRESENT ILLNESS:  Please refer to admission H and P.    Briefly, the patient is a 70-year-old female with a medical history significant for the above who is a nursing home resident who was sent complaining of some chest tightness and pain radiating to both of her shoulders. Per nursing staff, she was noted to have some tachycardia. She was brought to [place] where she had AFib with RVR, continued to have chest pain and shortness of breath. There, she received some IV Cardizem and IV metoprolol, continued to have AFib with RVR, started on a Cardizem drip. Family asked her to be transferred to [facility]. When we saw her, she denies any chest pain or shortness of breath, but appeared to be anxious and was screaming when left alone. She calmed down when admitting physician entered the room complaining of some itching of her feet. PHYSICAL EXAMINATION:  Vital signs were stable and normal.  She appeared to be in no distress. Had irregularly irregular heart, tachycardia, decreased breath sounds with bilateral lower lung field, bibasilar crackles, benign abdomen, 2+ lower extremity edema noted.     Labs on admission included metabolic panel normal except for a sodium 133, glucose 240. Liver function test normal.  Troponin I less than 0.02, other biomarkers within normal limits. Hemoglobin A1c of 8.8%. CBC normal with H and H of 9.9/29.6. HOSPITAL COURSE BY PROBLEM:  1. Atrial fibrillation with rapid ventricular response with history of cardioversion in January 2017. Maintain a Cardizem drip, switched over to p.o. Cardizem on the morning of admission, rate has been controlled. Eliquis was adjusted to 5 mg b.i.d. dosing. She does not reach dosing adjustment requirements given her age, weight, and renal function. Her heart rate is well controlled at the current time with both beta blocker, Cardizem, therapy in addition to her amiodarone. No further cardiac workup planned for cardiology. She had a 2D echocardiogram performed here, showing an ejection fraction 51-55%, left ventricular hypertrophy observed, left ventricular global hypokinesis, interventricular septal bounce motion. Right ventricular global systolic function moderately reduced. Moderate to severe MR, severe tricuspid valve regurg, some pulmonary hypertension noted, please refer to the report. No other acute issues at the current time. 2.  Hypertension:  Pressures have been normal with the above medicines. 3.  Diabetes mellitus type 2. On sliding scale insulin regimen. We held her metformin and glipizide while in house, she had a hemoglobin A1c of 8.8%. Resume outpatient medicines. 4.  Mild intermittent asthma as best as I can tell from the records. Nebulizers as needed. She can resume her asthma. 5.  Hyperlipidemia, on statin therapy. 6.  History of dementia:  Severe. Noted. Unknown baseline. She was conversant and responsive when I saw her, seemed pleasantly confused yesterday. She is a little bit somnolent today. We contacted her nursing home, and verified her outpatient medicines and adjusted her Prozac, Seroquel, Risperdal dosing and schedule.     7. Note again this is a preliminary summary dictated for continuity as the patient will be undergoing change of service and I anticipate the patient to be transferred back tomorrow. DISCHARGE MEDICATIONS:  New medicine as follows:  1. Amiodarone 200 mg p.o. every day -- The schedule was clarified. 2.  Eliquis 5 mg p.o. b.i.d. schedule clarified and justified as above. 3.  Prozac 40 mg p.o. q. p.m.  -- clarified and corrected records. 4.  Cozaar 25 mg p.o. every day -- new dose. 5.  Lopressor 25 mg p.o. b.i.d. -- new medicine. 6.  Risperdal 1 mg p.o. at bedtime -- clarified records. Otherwise, resume the followin. Tylenol 325 mg 2 tabs every 4 hours as needed for fever or pain. 2.  Albuterol MDI or nebulizer every 4-6 hours as needed for wheezing or shortness of breath    3. Asmanex 220 mcg 1 puff inhaled daily. 4.  Aspirin 81 mg daily. 5.  Menthol zinc oxide ointment to affected area t.i.d. as needed to inner buttocks as needed. 6.  Vitamin D3 2000 units p.o. daily. 7.  Glucotrol-XL 10 mg p.o. b.i.d.    8.  Glucagon as needed for hypoglycemia 1 mg intramuscularly. 9.  Metformin 1000 mg p.o. b.i.d. with meals. 10.  MiraLax 17 grams p.o. daily as needed for constipation. 11.  Singulair 10 mg p.o. every day. 12.  Prednisolone acetate 1% ophthalmic solution one drop left eye b.i.d.    13.  Seroquel 12.5 mg p.o. b.i.d. 14.  Zocor 10 mg p.o. at bedtime. FOLLOWUP:  With her PCP, Dr. Roderick Posada at 7-10 days. With cardiology in 3-4 weeks. Total time of discharge greater than 30 minutes. DISPOSITION:  Planned transfer to SNF.        Marsha Mcburney, MD PDP/MN  D: 2018 17:09     T: 2018 18:03  JOB #: [id number]  CC: Fer Joiner MD

## 2018-06-24 NOTE — PROGRESS NOTES
[facility]  Progress Note    Patient: Sariah Francisco Age: 66 y.o. : 1940 MR#: [id number] SSN: [id number]  Date: 2018     Subjective:     Case d/w nursing: has been somnolent all day. Confirmed outpt regimen of pt's Seroquel, Riserpdal, Prozac, and corrected. Pt is sleeping, rouses to verbal stim but easily falls back asleep. Denies pain or SOB. Appears comfortable. Cardiology note reviewed and appreciated. Assessment/Plan:   1. AFib c RVR - hx cardioversion in 2017. Is s/p Cardizem gtt, switched to PO on morning of admission. Rate controlled presently, remains in AFib on tele. Continue ASA, Eliquis, amiodarone, metoprolol. 2. HTN - BPs wnl. Following. 3. DM2 - SSI. HgbA1c of 8.8%. Hold metformin, glipizide while in-house. 4. Asthma - mild, intermittent, as best as I can tell from review of records. No acute. Nebs prn. Fluticasone in place of Asmanex while here. 5. Hyperlipidemia - statin. 6. Severe dementia hx - noted. Unknown baseline. Is conversant and responsive, but confused. Outpt meds: Prozac, Seroquel, Risperdal, doses and scheduled corrected while here. 7. Records reviewed. Last echo seen is from 2017: EF 60%, no regional wall motion abnml. Noted edema on admission. Repeat echo this admission: EF 51-55%, LV global hypokinesis. Full report reviewed, pasted below. 8. Aim for xfer tomorrow if remains stable. Additional Notes:     Echo:  · Left ventricular low normal systolic function. Estimated left ventricular ejection fraction is 51 - 55%. Left ventricular hypertrophy observed. Left ventricular global hypokinesis. Abnormal left ventricular septal motion. Interventricular septal \"bounce\". · Right ventricular cavity size is mildly dilatedRight ventricular global systolic function is moderately reduced. · Moderate to severe mitral valve regurgitation. · Severe tricuspid valve regurgitation is present.  Pulmonary hypertension is present. · Severely elevated central venous pressure (15+ mmHg); IVC diameter is larger than 21 mm and collapses less than 50% with respiration. · Left atrial cavity size is moderately dilated. · Right atrial cavity size is mildly dilated. · Wide open tricuspid regurgitation. PA pressures elevated by not quantifiable. Case discussed with:  []Patient  []Family  [x]Nursing  []Case Management  DVT Prophylaxis:  []Lovenox  []Hep SQ  []SCDs  []Coumadin   [x]On Eliquis    Objective:   VS:   Visit Vitals    /69 (BP 1 Location: Left arm, BP Patient Position: At rest)    Pulse 75    Temp 97.4 °F (36.3 °C)    Resp 20    Ht 5' 5\" (1.651 m)    Wt 103.1 kg (227 lb 6.4 oz)    SpO2 93%    BMI 37.84 kg/m2      Tmax/24hrs: Temp (24hrs), Av.5 °F (36.4 °C), Min:97.2 °F (36.2 °C), Max:97.8 °F (36.6 °C)      Intake/Output Summary (Last 24 hours) at 18 1651  Last data filed at 18 0112   Gross per 24 hour   Intake              960 ml   Output             1300 ml   Net             -340 ml       General:  Sleeping, somnolent, but rouses to verbal stim, answers a couple of questions and falls back asleep. Cardiovascular:  iRRR. Pulmonary:  CTA B ant.   GI:  Soft, NT/ND, NABS. Extremities:  No CT, trace BLE edema.    Additional:      Labs:    Recent Results (from the past 24 hour(s))   GLUCOSE, POC    Collection Time: 18 11:22 PM   Result Value Ref Range    Glucose (POC) 242 (H) 70 - 110 mg/dL   GLUCOSE, POC    Collection Time: 18  8:06 AM   Result Value Ref Range    Glucose (POC) 188 (H) 70 - 212 mg/dL   METABOLIC PANEL, BASIC    Collection Time: 18 11:10 AM   Result Value Ref Range    Sodium 133 (L) 136 - 145 mmol/L    Potassium 4.1 3.5 - 5.5 mmol/L    Chloride 99 (L) 100 - 108 mmol/L    CO2 23 21 - 32 mmol/L    Anion gap 11 3.0 - 18 mmol/L    Glucose 166 (H) 74 - 99 mg/dL    BUN 17 7.0 - 18 MG/DL    Creatinine 1.38 (H) 0.6 - 1.3 MG/DL    BUN/Creatinine ratio 12 12 - 20      GFR est AA 45 (L) >60 ml/min/1.73m2    GFR est non-AA 37 (L) >60 ml/min/1.73m2    Calcium 8.2 (L) 8.5 - 10.1 MG/DL   MAGNESIUM    Collection Time: 06/24/18 11:10 AM   Result Value Ref Range    Magnesium 1.6 1.6 - 2.6 mg/dL   PHOSPHORUS    Collection Time: 06/24/18 11:10 AM   Result Value Ref Range    Phosphorus 3.8 2.5 - 4.9 MG/DL   CBC WITH AUTOMATED DIFF    Collection Time: 06/24/18 11:10 AM   Result Value Ref Range    WBC 7.7 4.6 - 13.2 K/uL    RBC 3.36 (L) 4.20 - 5.30 M/uL    HGB 10.3 (L) 12.0 - 16.0 g/dL    HCT 30.0 (L) 35.0 - 45.0 %    MCV 89.3 74.0 - 97.0 FL    MCH 30.7 24.0 - 34.0 PG    MCHC 34.3 31.0 - 37.0 g/dL    RDW 15.0 (H) 11.6 - 14.5 %    PLATELET 829 781 - 832 K/uL    MPV 9.9 9.2 - 11.8 FL    NEUTROPHILS 88 (H) 40 - 73 %    LYMPHOCYTES 7 (L) 21 - 52 %    MONOCYTES 5 3 - 10 %    EOSINOPHILS 0 0 - 5 %    BASOPHILS 0 0 - 2 %    ABS. NEUTROPHILS 6.7 1.8 - 8.0 K/UL    ABS. LYMPHOCYTES 0.6 (L) 0.9 - 3.6 K/UL    ABS. MONOCYTES 0.4 0.05 - 1.2 K/UL    ABS. EOSINOPHILS 0.0 0.0 - 0.4 K/UL    ABS.  BASOPHILS 0.0 0.0 - 0.1 K/UL    DF AUTOMATED     GLUCOSE, POC    Collection Time: 06/24/18 11:27 AM   Result Value Ref Range    Glucose (POC) 175 (H) 70 - 110 mg/dL   GLUCOSE, POC    Collection Time: 06/24/18  3:33 PM   Result Value Ref Range    Glucose (POC) 129 (H) 70 - 110 mg/dL       Signed By: Lamberto Joiner MD     June 24, 2018 1:30 PM

## 2018-06-24 NOTE — PROGRESS NOTES
[facility]  -  Progress Note      Patient: Asha Gomez MRN: [id number]  SSN: [id number]    YOB: 1940  Age: 66 y.o. Sex: female      Admit Date: 6/23/2018    Assessment:     --Paroxysmal atrial fibrillation. Prolonged history of this dating back for several years. She has been managed with Eliquis for oral anticoagulation and amiodarone for rhythm control. In the past, she has been treated with both metoprolol and diltiazem to help with rate control as well. Patient's heart rate now appears to be well-controlled on metoprolol and amiodarone. --Valvular heart disease. Echocardiogram done 6/23 demonstrated wide-open tricuspid regurgitation and moderate to severe mitral regurgitation. Her LV function was difficult to assess, but her ejection fraction appeared to be grossly normal in the 50-55% range. Historically her LV function has been normal.  This degree of regurgitation appears to be new. She is not a candidate for any sort of valve surgery. She does not appear to be in decompensated heart failure. --History of junctional bradycardia. Because of this I would not be overly aggressive on her rate slowing agents. --Essential hypertension. Patient blood pressure appears to be well-controlled. --Anemia. This appears to be chronic with slightly worsening H&H. --Alzheimer's dementia. Patient now resides at the [address] [place] [address]. --Diabetes mellitus type 2.       Plan:     No further cardiac workup planned. Continue her current cardiac medical regimen. Stable for discharge back to her skilled nursing facility from a cardiac standpoint tomorrow. Subjective:     No new complaints.      Objective:      Patient Vitals for the past 8 hrs:   Temp Pulse Resp BP SpO2   06/24/18 1500 97.4 °F (36.3 °C) 75 20 110/69 93 %   06/24/18 1132 97.8 °F (36.6 °C) 74 20 138/73 90 %   06/24/18 0747 97.5 °F (36.4 °C) 93 20 125/68 90 %         Patient Vitals for the past 96 hrs:   Weight   06/24/18 0437 103.1 kg (227 lb 6.4 oz)   06/23/18 0600 101.6 kg (224 lb)         Intake/Output Summary (Last 24 hours) at 06/24/18 1544  Last data filed at 06/24/18 0112   Gross per 24 hour   Intake              960 ml   Output             1300 ml   Net             -340 ml       Physical Exam:  General:  fatigued, cooperative, no distress, appears stated age  Neck:  no JVD  Lungs:  clear to auscultation bilaterally  Heart:  irregularly irregular rhythm, 2/6 systolic murmur LLSB. SOCH  Abdomen:  abdomen is soft without significant tenderness, masses, organomegaly or guarding  Extremities:  extremities normal, atraumatic, no cyanosis or edema    Data Review:     Labs: Results:       Chemistry Recent Labs      06/24/18   1110  06/23/18   0540   GLU  166*  240*   NA  133*  133*   K  4.1  3.5   CL  99*  102   CO2  23  21   BUN  17  14   CREA  1.38*  0.93   CA  8.2*  8.3*   MG  1.6   --    PHOS  3.8   --    AGAP  11  10   BUCR  12  15   AP   --   80   TP   --   6.5   ALB   --   3.0*   GLOB   --   3.5   AGRAT   --   0.9      CBC w/Diff Recent Labs      06/24/18   1110  06/23/18   0540   WBC  7.7  6.0   RBC  3.36*  3.26*   HGB  10.3*  9.9*   HCT  30.0*  29.6*   PLT  197  203   GRANS  88*  77*   LYMPH  7*  13*   EOS  0  1      Cardiac Enzymes No results found for: CPK, CK, CKMMB, CKMB, RCK3, CKMBT, CKNDX, CKND1, NAMAN, TROPT, TROIQ, RONA, TROPT, TNIPOC, BNP, BNPP   Coagulation No results for input(s): PTP, INR, APTT in the last 72 hours.     No lab exists for component: INREXT    Lipid Panel Lab Results   Component Value Date/Time    Cholesterol, total 118 (L) 03/01/2017 09:54 AM    HDL Cholesterol 48 03/01/2017 09:54 AM    LDL, calculated 54 03/01/2017 09:54 AM    Triglyceride 78 03/01/2017 09:54 AM    CHOL/HDL Ratio 2.5 03/01/2017 09:54 AM      BNP Lab Results   Component Value Date/Time    BNP 88.0 04/12/2016 10:30 AM      Liver Enzymes Recent Labs      06/23/18   0540   TP  6.5   ALB  3.0*   AP  80   SGOT 15      Digoxin    Thyroid Studies Lab Results   Component Value Date/Time    TSH 1.38 06/23/2018 05:40 AM

## 2018-06-25 NOTE — PROGRESS NOTES
[facility]    Patient: Asha Gomez Age: 66 y.o. : 1940 MR#: [id number] SSN: [id number]  Date: 2018     Subjective:     Case d/w nursing: pt was somnolent all day yesterday but better today, she did not eat or drink yesterday. Obtained  outpt med's form [facility], pt's not on Seroquel, but she is on Riserpdal, Prozac. Pt feels ok, Denies pain or SOB. Appears comfortable. Assessment/Plan:   1. AFib c RVR - hx cardioversion in 2017. remains in AFib on tele but rate controlled. Continue ASA, Eliquis, amiodarone, metoprolol per cardio  2. Prerenal azotemia: possible du eto lasix and poor oral intake, will start IVF  2. HTN - BPs wnl. Following. Will hold ARB. 3. DM2 - SSI. HgbA1c of 8.8%. Hold metformin, glipizide while in-house. 4. Asthma - mild, intermittent, mild wheezing, will start Nebs prn. Fluticasone in place of Asmanex while here. 5. Hyperlipidemia - statin. 6. Severe dementia hx - noted. Unknown baseline. Is conversant and responsive, but confused. 7. Moderate to severe mitral valve regurgitation: plan per cardio. 8. Aim for xfer tomorrow if remains stable and crt better.   D/w pt and daughter Ms Reema Ellis over phone in detail  Full code   Will get PT/OT     Case discussed with:  [x]Patient  [x]Family  [x]Nursing  []Case Management  DVT Prophylaxis:  []Lovenox  []Hep SQ  []SCDs  []Coumadin   [x]On Eliquis    Objective:   VS:   Visit Vitals    /69 (BP 1 Location: Left arm, BP Patient Position: At rest)    Pulse 85    Temp 97.8 °F (36.6 °C)    Resp 18    Ht 5' 5\" (1.651 m)    Wt 101.2 kg (223 lb)    SpO2 96%    BMI 37.11 kg/m2      Tmax/24hrs: Temp (24hrs), Av.7 °F (36.5 °C), Min:97.4 °F (36.3 °C), Max:97.8 °F (36.6 °C)      Intake/Output Summary (Last 24 hours) at 18 1012  Last data filed at 18 0459   Gross per 24 hour   Intake               50 ml   Output                0 ml   Net 50 ml       General: AA, NAD. Cardiovascular:  iRRR. Pulmonary: some exp wheezing. GI:  Soft, NT/ND, NABS. Extremities:  No CT, trace BLE edema. Neuro: AAOx1-2, moves all ext     Labs:    Recent Results (from the past 24 hour(s))   METABOLIC PANEL, BASIC    Collection Time: 06/24/18 11:10 AM   Result Value Ref Range    Sodium 133 (L) 136 - 145 mmol/L    Potassium 4.1 3.5 - 5.5 mmol/L    Chloride 99 (L) 100 - 108 mmol/L    CO2 23 21 - 32 mmol/L    Anion gap 11 3.0 - 18 mmol/L    Glucose 166 (H) 74 - 99 mg/dL    BUN 17 7.0 - 18 MG/DL    Creatinine 1.38 (H) 0.6 - 1.3 MG/DL    BUN/Creatinine ratio 12 12 - 20      GFR est AA 45 (L) >60 ml/min/1.73m2    GFR est non-AA 37 (L) >60 ml/min/1.73m2    Calcium 8.2 (L) 8.5 - 10.1 MG/DL   MAGNESIUM    Collection Time: 06/24/18 11:10 AM   Result Value Ref Range    Magnesium 1.6 1.6 - 2.6 mg/dL   PHOSPHORUS    Collection Time: 06/24/18 11:10 AM   Result Value Ref Range    Phosphorus 3.8 2.5 - 4.9 MG/DL   CBC WITH AUTOMATED DIFF    Collection Time: 06/24/18 11:10 AM   Result Value Ref Range    WBC 7.7 4.6 - 13.2 K/uL    RBC 3.36 (L) 4.20 - 5.30 M/uL    HGB 10.3 (L) 12.0 - 16.0 g/dL    HCT 30.0 (L) 35.0 - 45.0 %    MCV 89.3 74.0 - 97.0 FL    MCH 30.7 24.0 - 34.0 PG    MCHC 34.3 31.0 - 37.0 g/dL    RDW 15.0 (H) 11.6 - 14.5 %    PLATELET 241 646 - 619 K/uL    MPV 9.9 9.2 - 11.8 FL    NEUTROPHILS 88 (H) 40 - 73 %    LYMPHOCYTES 7 (L) 21 - 52 %    MONOCYTES 5 3 - 10 %    EOSINOPHILS 0 0 - 5 %    BASOPHILS 0 0 - 2 %    ABS. NEUTROPHILS 6.7 1.8 - 8.0 K/UL    ABS. LYMPHOCYTES 0.6 (L) 0.9 - 3.6 K/UL    ABS. MONOCYTES 0.4 0.05 - 1.2 K/UL    ABS. EOSINOPHILS 0.0 0.0 - 0.4 K/UL    ABS.  BASOPHILS 0.0 0.0 - 0.1 K/UL    DF AUTOMATED     GLUCOSE, POC    Collection Time: 06/24/18 11:27 AM   Result Value Ref Range    Glucose (POC) 175 (H) 70 - 110 mg/dL   GLUCOSE, POC    Collection Time: 06/24/18  3:33 PM   Result Value Ref Range    Glucose (POC) 129 (H) 70 - 110 mg/dL GLUCOSE, POC    Collection Time: 06/24/18  9:48 PM   Result Value Ref Range    Glucose (POC) 121 (H) 70 - 642 mg/dL   METABOLIC PANEL, BASIC    Collection Time: 06/25/18  2:46 AM   Result Value Ref Range    Sodium 133 (L) 136 - 145 mmol/L    Potassium 3.7 3.5 - 5.5 mmol/L    Chloride 100 100 - 108 mmol/L    CO2 22 21 - 32 mmol/L    Anion gap 11 3.0 - 18 mmol/L    Glucose 112 (H) 74 - 99 mg/dL    BUN 22 (H) 7.0 - 18 MG/DL    Creatinine 1.82 (H) 0.6 - 1.3 MG/DL    BUN/Creatinine ratio 12 12 - 20      GFR est AA 33 (L) >60 ml/min/1.73m2    GFR est non-AA 27 (L) >60 ml/min/1.73m2    Calcium 7.8 (L) 8.5 - 10.1 MG/DL   CBC WITH AUTOMATED DIFF    Collection Time: 06/25/18  2:46 AM   Result Value Ref Range    WBC 6.6 4.6 - 13.2 K/uL    RBC 3.29 (L) 4.20 - 5.30 M/uL    HGB 10.1 (L) 12.0 - 16.0 g/dL    HCT 29.6 (L) 35.0 - 45.0 %    MCV 90.0 74.0 - 97.0 FL    MCH 30.7 24.0 - 34.0 PG    MCHC 34.1 31.0 - 37.0 g/dL    RDW 15.3 (H) 11.6 - 14.5 %    PLATELET 873 181 - 822 K/uL    MPV 10.3 9.2 - 11.8 FL    NEUTROPHILS 71 40 - 73 %    LYMPHOCYTES 17 (L) 21 - 52 %    MONOCYTES 10 3 - 10 %    EOSINOPHILS 2 0 - 5 %    BASOPHILS 0 0 - 2 %    ABS. NEUTROPHILS 4.7 1.8 - 8.0 K/UL    ABS. LYMPHOCYTES 1.1 0.9 - 3.6 K/UL    ABS. MONOCYTES 0.7 0.05 - 1.2 K/UL    ABS. EOSINOPHILS 0.1 0.0 - 0.4 K/UL    ABS.  BASOPHILS 0.0 0.0 - 0.1 K/UL    DF AUTOMATED     GLUCOSE, POC    Collection Time: 06/25/18  7:57 AM   Result Value Ref Range    Glucose (POC) 116 (H) 70 - 110 mg/dL       Signed By: Noah Caceres MD     June 25, 2018 1:30 PM

## 2018-06-25 NOTE — ROUTINE PROCESS
Bedside and Verbal shift change report given to Solange Jeffries RN (oncoming nurse) by Aaron Romero (offgoing nurse). Report included the following information SBAR, Kardex, MAR and Recent Results.     SITUATION:    Code Status: Full Code   Reason for Admission: AFIB   Atrial fibrillation with RVR ([address]   Atrial fibrillation with RVR ([address]    [facility] day: 2   Problem List:       Hospital Problems  Date Reviewed: 6/23/2018          Codes Class Noted POA    * (Principal)Atrial fibrillation with RVR ([address] ICD-10-CM: I48.91  ICD-9-CM: 427.31  6/23/2018 Yes        Chronic anticoagulation ICD-10-CM: Z79.01  ICD-9-CM: V58.61  6/23/2018 Yes        Lower extremity edema ICD-10-CM: R60.0  ICD-9-CM: 782.3  6/23/2018 Unknown        Psychiatric disorder ICD-10-CM: F99  ICD-9-CM: 300.9  6/23/2018 Unknown              BACKGROUND:    Past Medical History:   Past Medical History:   Diagnosis Date    Anemia NEC     Arthritis     Arthropathy, unspecified, site unspecified     Asthma     Atrial fibrillation ([address]     CHRONIC A-FIB    Diabetes mellitus     Essential hypertension     Heart abnormalities     irregular heartbeat    Hypertension     Liver disease     steatohepatitis    Neurological disorder     dementia    Osteoarthritis     LEFT HIP         Patient taking anticoagulants yes     ASSESSMENT:    Changes in Assessment Throughout Shift: none     Patient has Central Line: no Reasons if yes: n/a   Patient has Aden Cath: no Reasons if yes: n/a      Last Vitals:     Vitals:    06/24/18 1951 06/25/18 0023 06/25/18 0325 06/25/18 0445   BP: 99/64 131/81 126/65    Pulse: 73 84 61    Resp: 16 18 18    Temp: 97.4 °F (36.3 °C) 97.8 °F (36.6 °C) 97.7 °F (36.5 °C)    SpO2: 96% 98% 97%    Weight:    101.2 kg (223 lb)   Height:            IV and DRAINS (will only show if present)   External Female Catheter 06/23/18-Site Assessment: Clean, dry, & intact  Peripheral IV 06/23/18 Right Antecubital-Site Assessment: Clean, dry, & intact     WOUND (if present)   Wound Type:  none   Dressing present Dressing Present : No   Wound Concerns/Notes:  none     PAIN    Pain Assessment    Pain Intensity 1: 0 (06/25/18 0325)    Pain Location 1: Neck, Shoulder    Pain Intervention(s) 1: Medication (see MAR)    Patient Stated Pain Goal: 0  o Interventions for Pain:  none  o Intervention effective: n/a  o Time of last intervention: n/a   o Reassessment Completed: yes      Last 3 Weights:  Last 3 Recorded Weights in this Encounter    06/23/18 0600 06/24/18 0437 06/25/18 0445   Weight: 101.6 kg (224 lb) 103.1 kg (227 lb 6.4 oz) 101.2 kg (223 lb)     Weight change: -1.996 kg (-4 lb 6.4 oz)     INTAKE/OUPUT    Current Shift:      Last three shifts: 06/23 1901 - 06/25 0700  In: 50 [P.O.:50]  Out: 1300 [Urine:1300]     LAB RESULTS     Recent Labs      06/25/18   0246  06/24/18   1110  06/23/18   0540   WBC  6.6  7.7  6.0   HGB  10.1*  10.3*  9.9*   HCT  29.6*  30.0*  29.6*   PLT  210  197  203        Recent Labs      06/25/18   0246  06/24/18   1110  06/23/18   0540   NA  133*  133*  133*   K  3.7  4.1  3.5   GLU  112*  166*  240*   BUN  22*  17  14   CREA  1.82*  1.38*  0.93   CA  7.8*  8.2*  8.3*   MG   --   1.6   --        RECOMMENDATIONS AND DISCHARGE PLANNING     1. Pending tests/procedures/ Plan of Care or Other Needs: continue to monitor     2. Discharge plan for patient and Needs/Barriers: [facility]    3. Estimated Discharge Date: tbd Posted on Whiteboard in Patients Room: no      4. The patient's care plan was reviewed with the oncoming nurse. \"HEALS\" SAFETY CHECK      Fall Risk    Total Score: 4    Safety Measures: Safety Measures: Bed/Chair-Wheels locked, Bed in low position, Call light within reach    A safety check occurred in the patient's room between off going nurse and oncoming nurse listed above.     The safety check included the below items  Area Items   H  High Alert Medications - Verify all high alert medication drips (heparin, PCA, etc.)   E  Equipment - Suction is set up for ALL patients (with ted)  - Red plugs utilized for all equipment (IV pumps, etc.)  - WOWs wiped down at end of shift.  - Room stocked with oxygen, suction, and other unit-specific supplies   A  Alarms - Bed alarm is set for fall risk patients  - Ensure chair alarm is in place and activated if patient is up in a chair   L  Lines - Check IV for any infiltration  - Aden bag is empty if patient has a Aden   - Tubing and IV bags are labeled   S  Safety   - Room is clean, patient is clean, and equipment is clean. - Hallways are clear from equipment besides carts. - Fall bracelet on for fall risk patients  - Ensure room is clear and free of clutter  - Suction is set up for ALL patients (with ted)  - Hallways are clear from equipment besides carts.    - Isolation precautions followed, supplies available outside room, sign posted     Electa Limb

## 2018-06-25 NOTE — PROGRESS NOTES
Problem: Falls - Risk of  Goal: *Absence of Falls  Document Dipesh Fall Risk and appropriate interventions in the flowsheet.    Outcome: Progressing Towards Goal  Fall Risk Interventions:  Mobility Interventions: OT consult for ADLs, PT Consult for mobility concerns, PT Consult for assist device competence    Mentation Interventions: Door open when patient unattended, Evaluate medications/consider consulting pharmacy, Eyeglasses and hearing aids, Update white board, More frequent rounding    Medication Interventions: Evaluate medications/consider consulting pharmacy, Patient to call before getting OOB, Teach patient to arise slowly    Elimination Interventions: Call light in reach, Patient to call for help with toileting needs, Toileting schedule/hourly rounds

## 2018-06-25 NOTE — PROGRESS NOTES
[facility]  -  Progress Note      Patient: Gerald Anderson MRN: [id number]  SSN: [id number]    YOB: 1940  Age: 66 y.o. Sex: female      Admit Date: 6/23/2018    Hospital Problem List:     Hospital Problems  Date Reviewed: 6/23/2018          Codes Class Noted POA    * (Principal)Atrial fibrillation with RVR ([address] ICD-10-CM: I48.91  ICD-9-CM: 427.31  6/23/2018 Yes        Chronic anticoagulation ICD-10-CM: Z79.01  ICD-9-CM: V58.61  6/23/2018 Yes        Lower extremity edema ICD-10-CM: R60.0  ICD-9-CM: 782.3  6/23/2018 Unknown        Psychiatric disorder ICD-10-CM: F99  ICD-9-CM: 300.9  6/23/2018 Unknown            --Paroxysmal atrial fibrillation.  Prolonged history of this dating back for several years.    She has been managed with Eliquis for oral anticoagulation and amiodarone for rhythm control.  In the past, she has been treated with both metoprolol and diltiazem to help with rate control as well.    Patient's heart rate now appears to be well-controlled on metoprolol and amiodarone.     --Valvular heart disease.  Echocardiogram done 6/23 demonstrated wide-open tricuspid regurgitation and moderate to severe mitral regurgitation.  Her LV function was difficult to assess, but her ejection fraction appeared to be grossly normal in the 50-55% range.  Historically her LV function has been normal.  This degree of regurgitation appears to be new.  She is not a candidate for any sort of valve surgery.  She does not appear to be in decompensated heart failure. --History of junctional bradycardia.  Because of this I would not be overly aggressive on her rate slowing agents. --Essential hypertension.  Patient blood pressure appears to be well-controlled. --Anemia.  This appears to be chronic with slightly worsening H&H. --Alzheimer's dementia.  Patient now resides at the [facility]. --Diabetes mellitus type 2. Assessment & Plan:     -Stable from our standpoint.  Will sign off and be available if needed. Planned for transfer back to SNF tomorrow. Subjective:     No new complaints. Objective:      Patient Vitals for the past 8 hrs:   Temp Pulse Resp BP SpO2   06/25/18 1249 97.5 °F (36.4 °C) 87 18 111/73 95 %   06/25/18 1048 - - - - 93 %   06/25/18 0754 97.8 °F (36.6 °C) 85 18 108/69 96 %         Patient Vitals for the past 96 hrs:   Weight   06/25/18 0445 101.2 kg (223 lb)   06/24/18 0437 103.1 kg (227 lb 6.4 oz)   06/23/18 0600 101.6 kg (224 lb)         Intake/Output Summary (Last 24 hours) at 06/25/18 1447  Last data filed at 06/25/18 0459   Gross per 24 hour   Intake               50 ml   Output                0 ml   Net               50 ml       Physical Exam:  General:  Awake, alert, oriented  Neck: supple without jvd  Lungs:  Clear to auscultation bilat  Heart:  irreg rate and rhythm  Abdomen:  soft  Extremities:  No edema or cyanosis    Data Review:     Labs: Results:       Chemistry Recent Labs      06/25/18   0246 06/24/18   1110  06/23/18   0540   GLU  112*  166*  240*   NA  133*  133*  133*   K  3.7  4.1  3.5   CL  100  99*  102   CO2  22  23  21   BUN  22*  17  14   CREA  1.82*  1.38*  0.93   CA  7.8*  8.2*  8.3*   MG   --   1.6   --    PHOS   --   3.8   --    AGAP  11  11  10   BUCR  12  12  15   AP   --    --   80   TP   --    --   6.5   ALB   --    --   3.0*   GLOB   --    --   3.5   AGRAT   --    --   0.9      CBC w/Diff Recent Labs      06/25/18   0246  06/24/18   1110  06/23/18   0540   WBC  6.6  7.7  6.0   RBC  3.29*  3.36*  3.26*   HGB  10.1*  10.3*  9.9*   HCT  29.6*  30.0*  29.6*   PLT  210  197  203   GRANS  71  88*  77*   LYMPH  17*  7*  13*   EOS  2  0  1      Cardiac Enzymes No results found for: CPK, CK, CKMMB, CKMB, RCK3, CKMBT, CKNDX, CKND1, NAMAN, TROPT, TROIQ, RONA, TROPT, TNIPOC, BNP, BNPP   Coagulation No results for input(s): PTP, INR, APTT in the last 72 hours.     No lab exists for component: INREXT    Lipid Panel Lab Results   Component Value Date/Time    Cholesterol, total 118 (L) 03/01/2017 09:54 AM    HDL Cholesterol 48 03/01/2017 09:54 AM    LDL, calculated 54 03/01/2017 09:54 AM    Triglyceride 78 03/01/2017 09:54 AM    CHOL/HDL Ratio 2.5 03/01/2017 09:54 AM      BNP Lab Results   Component Value Date/Time    BNP 88.0 04/12/2016 10:30 AM      Liver Enzymes Recent Labs      06/23/18   0540   TP  6.5   ALB  3.0*   AP  80   SGOT  15      Digoxin    Thyroid Studies Lab Results   Component Value Date/Time    TSH 1.38 06/23/2018 05:40 AM            Signed By: KRISTIN Bae     June 25, 2018

## 2018-06-26 NOTE — PROGRESS NOTES
Problem: Falls - Risk of  Goal: *Absence of Falls  Document Dipesh Fall Risk and appropriate interventions in the flowsheet.    Outcome: Progressing Towards Goal  Fall Risk Interventions:  Mobility Interventions: Communicate number of staff needed for ambulation/transfer, PT Consult for mobility concerns, PT Consult for assist device competence    Mentation Interventions: Bed/chair exit alarm, Door open when patient unattended, Update white board, Reorient patient    Medication Interventions: Patient to call before getting OOB, Teach patient to arise slowly    Elimination Interventions: Call light in reach, Patient to call for help with toileting needs, Toileting schedule/hourly rounds

## 2018-06-26 NOTE — PROGRESS NOTES
PT evaluation completed with recommendation of SNF. Full note to follow.     Thank you, Caleb Guevara PT, DPT

## 2018-06-26 NOTE — PROGRESS NOTES
Problem: Self Care Deficits Care Plan (Adult)  Goal: *Acute Goals and Plan of Care (Insert Text)  Occupational Therapy Goals  Initiated 6/26/2018 within 7 day(s). 1.  Patient will perform grooming with supervision/set-up. 2.  Patient will perform upper body dressing with supervision/set-up. 3.  Patient will perform lower body dressing with minimal assistance/contact guard assist.  4.  Patient will perform toilet transfers with moderate assistance . 5. Patient will participate in upper extremity therapeutic exercise/activities with supervision/set-up for 8 minutes to increase strength/endurance for ADLs. Outcome: Progressing Towards Goal  Occupational Therapy EVALUATION    Patient: Kerrie Joseph (16 y.o. female)  Date: 6/26/2018  Primary Diagnosis: AFIB  Atrial fibrillation with RVR ([facility])  Atrial fibrillation with RVR ([facility])        Precautions:   Fall, Skin    ASSESSMENT :  Based on the objective data described below, the patient presents with decreased ADLs, decreased functional mobility and muscle weakness, complicated by dementia. Patient's daughter in room to answer questions. She requires mod to max assist with LB self care but is able to assist with UB tasks. Patient has assistance at [facility] with all ADLs and uses a wheelchair for mobility. Recommend return to [facility] with home health to increase her functional independence in her familiar environment. Patient will benefit from skilled intervention to address the above impairments.   Patients rehabilitation potential is considered to be Good  Factors which may influence rehabilitation potential include:   []             None noted  []             Mental ability/status  [x]             Medical condition  []             Home/family situation and support systems  []             Safety awareness  []             Pain tolerance/management  []             Other:      PLAN :  Recommendations and Planned Interventions:  [x]               Self Care Training                  [x]        Therapeutic Activities  [x]               Functional Mobility Training    []        Cognitive Retraining  [x]               Therapeutic Exercises           [x]        Endurance Activities  [x]               Balance Training                   []        Neuromuscular Re-Education  []               Visual/Perceptual Training     [x]   Home Safety Training  [x]               Patient Education                 [x]        Family Training/Education  []               Other (comment):    Frequency/Duration: Patient will be followed by occupational therapy 1-2 times per day/4-7 days per week to address goals. Discharge Recommendations: Home Health  Further Equipment Recommendations for Discharge: N/A     Barriers to Learning/Limitations: yes;  cognitive  Compensate with: visual, verbal, tactile, kinesthetic cues/model     PATIENT COMPLEXITY      Eval Complexity: History: MEDIUM Complexity : Expanded review of history including physical, cognitive and psychosocial  history ; Examination: MEDIUM Complexity : 3-5 performance deficits relating to physical, cognitive , or psychosocial skils that result in activity limitations and / or participation restrictions; Decision Making:MEDIUM Complexity : Patient may present with comorbidities that affect occupational performnce. Miniml to moderate modification of tasks or assistance (eg, physical or verbal ) with assesment(s) is necessary to enable patient to complete evaluation  Assessment: Moderate Complexity     G-CODES:     Self Care  Current  CL= 60-79%   Goal  CK= 40-59%. The severity rating is based on the Level of Assistance required for Functional Mobility and ADLs. SUBJECTIVE:   Patient stated I'm not sure.     OBJECTIVE DATA SUMMARY:     Past Medical History:   Diagnosis Date    Anemia NEC     Arthritis     Arthropathy, unspecified, site unspecified     Asthma     Atrial fibrillation (HCC)     CHRONIC A-FIB    Diabetes mellitus     Essential hypertension     Heart abnormalities     irregular heartbeat    Hypertension     Liver disease     steatohepatitis    Neurological disorder     dementia    Osteoarthritis     LEFT HIP     Past Surgical History:   Procedure Laterality Date    HX Rhonda Sahu HIP REPLACEMENT  11/2014    HX OTHER SURGICAL  5/27/12    colon mass removed    HX TUBAL LIGATION       Prior Level of Function/Home Situation: Pt required min assist with basic self care tasks and used a wheelchair for functional mobility PTA. Home Situation  Home Environment: [address] [place] [address] Road Name: [address] [place] [address]  One/Two Story Residence: One story  Living Alone: No  Support Systems: Assisted living, Family member(s)  Patient Expects to be Discharged to[de-identified] Assisted living  Current DME Used/Available at Home: [employer], [address] [employer] chair, Raised toilet seat, Cane, straight  Tub or Shower Type: Shower  [x]  Right hand dominant   []  Left hand dominant  Cognitive/Behavioral Status:  Neurologic State: Alert;Confused  Orientation Level: Oriented to person;Disoriented to place; Disoriented to time  Cognition: Follows commands;Memory loss  Safety/Judgement: Fall prevention    Skin: Intact on UEs    Edema: None noted in UEs    Vision/Perceptual:    Acuity: Able to read clock/calendar on wall without difficulty    Corrective Lenses: Glasses    Coordination:  Fine Motor Skills-Upper: Left Intact; Right Intact    Gross Motor Skills-Upper: Left Intact; Right Intact    Balance:  Sitting: Impaired  Sitting - Static: Good (unsupported)  Sitting - Dynamic: Fair (occasional)  Standing: Impaired; With support  Standing - Static: Poor    Strength:  Strength: Generally decreased, functional (UEs; 4/5 throughout)    Tone & Sensation:  Tone: Normal (UEs)  Sensation: Intact (UEs)    Range of Motion:  AROM: Within functional limits (UEs)    Functional Mobility and Transfers for ADLs:  Bed Mobility:  Supine to Sit: Maximum assistance  Sit to Supine: Moderate assistance  Scooting: Moderate assistance  Transfers:  Sit to Stand: Maximum assistance   Toilet Transfer : Maximum assistance    ADL Assessment:  Feeding: Setup;Supervision    Oral Facial Hygiene/Grooming: Minimum assistance    Bathing: Moderate assistance    Upper Body Dressing: Minimum assistance    Lower Body Dressing: Moderate assistance    Toileting: Moderate assistance    Pain:  Pt reports 0/10 pain or discomfort prior to treatment.    Pt reports 0/10 pain or discomfort post treatment. Activity Tolerance:   Good    Please refer to the flowsheet for vital signs taken during this treatment. After treatment:   [] Patient left in no apparent distress sitting up in chair  [x] Patient left in no apparent distress in bed  [x] Call bell left within reach  [] Nursing notified  [x] Caregiver (daughter) present  [] Bed alarm activated    COMMUNICATION/EDUCATION:   [x] Home safety education was provided and the patient/caregiver indicated understanding. [x] Patient/family have participated as able in goal setting and plan of care. [x] Patient/family agree to work toward stated goals and plan of care. [] Patient understands intent and goals of therapy, but is neutral about his/her participation. [] Patient is unable to participate in goal setting and plan of care.     Thank you for this referral.  Marialuisa Hughes MS OTR/L  Time Calculation: 15 mins

## 2018-06-26 NOTE — PROGRESS NOTES
Problem: Mobility Impaired (Adult and Pediatric)  Goal: *Acute Goals and Plan of Care (Insert Text)  Physical Therapy Goals  Initiated 6/26/2018 and to be accomplished within 7 day(s)  1. Patient will move from supine to sit and sit to supine and scoot up and down in bed with minimal assistance/contact guard assist.     2.  Patient will transfer from bed to chair and chair to bed with minimal assistance/contact guard assist using the least restrictive device. 3.  Patient will perform sit to stand with minimal assistance/contact guard assist.  4.  Patient will ambulate with minimal assistance/contact guard assist for >10 feet with the least restrictive device. Outcome: Progressing Towards Goal  physical Therapy EVALUATION    Patient: Eusebio Guzmán (37 y.o. female)  Date: 6/26/2018  Primary Diagnosis: AFIB  Atrial fibrillation with RVR (HCC)  Atrial fibrillation with RVR (HCC)  Precautions: Fall, Skin    OBJECTIVE/ASSESSMENT :  Based on the objective data described below, the patient presents with impaired functional mobility including bed mobility, transfers, ambulation, and general activity tolerance following admission for A-Fib with RVR. Patient presented today semi-reclined in bed, anxious but agreeable to PT evaluation. Patient transferred to sitting EOB with MaxA, trained in pursed lip breathing to ease anxiety and wheezing. Patient then completed sit to stand x2 trials with MaxA and RW, stood 10-20 seconds each trial. Patient scooted along EOB with ModA, then transferred back to supine with ModA. At conclusion of session, patient left sitting up in chair position of bed with lunch tray in reach, call bell in reach, needs met, and nurse notified.   Education: bed mobility, transfers, assistive device management, pursed lip breathing, activity pacing, energy conservation, body mechanics -- patient verbalized understanding    Patient will benefit from skilled intervention to address the above impairments. Patients rehabilitation potential is considered to be Fair  Factors which may influence rehabilitation potential include:   []         None noted  []         Mental ability/status  []         Medical condition  []         Home/family situation and support systems  []         Safety awareness  []         Pain tolerance/management  []         Other:      PLAN :  Recommendations and Planned Interventions:  [x]           Bed Mobility Training             [x]    Neuromuscular Re-Education  [x]           Transfer Training                   []    Orthotic/Prosthetic Training  [x]           Gait Training                          []    Modalities  [x]           Therapeutic Exercises          []    Edema Management/Control  [x]           Therapeutic Activities            [x]    Patient and Family Training/Education  []           Other (comment):    Frequency/Duration: Patient will be followed by physical therapy 1-2 times per day, 4-7 days per week to address goals. Discharge Recommendations: [facility]  Further Equipment Recommendations for Discharge: TBD     SUBJECTIVE:   Patient stated Did I really do well?     OBJECTIVE DATA SUMMARY:     Past Medical History:   Diagnosis Date    Anemia NEC     Arthritis     Arthropathy, unspecified, site unspecified     Asthma     Atrial fibrillation ([facility] [address] [address]     CHRONIC A-FIB    Diabetes mellitus     Essential hypertension     Heart abnormalities     irregular heartbeat    Hypertension     Liver disease     steatohepatitis    Neurological disorder     dementia    Osteoarthritis     LEFT HIP     Past Surgical History:   Procedure Laterality Date    HX Eward Camper HIP REPLACEMENT  11/2014    HX OTHER SURGICAL  5/27/12    colon mass removed    HX TUBAL LIGATION       Barriers to Learning/Limitations: yes;  altered mental status (i.e.Sedation, Confusion)  Compensate with: Visual Cues, Verbal Cues and Tactile Cues  Prior Level of Function/Home Situation: Per medical chart[facility] Patient has advanced dementia, unable to provide accurate PLOF and no family available in room for clarification. Home Situation  [facility] Road Name: [address] [place] [address]  One/Two Story Residence: One story  Living Alone: No  Support Systems: Assisted living, Family member(s)  Patient Expects to be Discharged to[de-identified] Assisted living  Current DME Used/Available at Home: Kavita Reek, Shower chair, Raised toilet seat, Cane, straight  Critical Behavior:  Neurologic State: Confused; Restless  Psychosocial  Patient Behaviors: Anxious; Cooperative  Purposeful Interaction: Yes  Pt Identified Daily Priority: Clinical issues (comment)  Caritas Process: Nurture loving kindness;Establish trust;Supportive expression; Teaching/learning; Attend basic human needs  Caring Interventions: Therapeutic modalities; Reassure  Reassure: Therapeutic listening; Informing; Acceptance;Caring rounds; Support family  Therapeutic Modalities: Intentional therapeutic touch  Strength:    Strength: Generally decreased, functional (BLE)  Tone & Sensation:   Tone: Normal (BLE)  Sensation: Intact (BLE)   Range Of Motion:  AROM: Within functional limits (BLE)  Functional Mobility:  Bed Mobility:  Supine to Sit: Maximum assistance  Sit to Supine: Moderate assistance  Scooting: Moderate assistance  Transfers:  Sit to Stand: Maximum assistance  Stand to Sit: Maximum assistance  Balance:   Sitting: Impaired  Sitting - Static: Good (unsupported)  Sitting - Dynamic: Fair (occasional)  Standing: Impaired; With support  Standing - Static: Poor  Ambulation/Gait Training:   N/A  Pain:  Pre session: 0/10  Post session: 0/10  Activity Tolerance:   Fair  Please refer to the flowsheet for vital signs taken during this treatment.   After treatment:   [] Patient left in no apparent distress sitting up in chair  [] Patient left sitting on EOB  [x] Patient left in no apparent distress in bed  [] Patient declined to be OOB at this time due to  [x] Call bell left within reach  [x] Nursing notified(Jordi)  [] Caregiver present  [x] Bed alarm activated  [] SCDs in place    COMMUNICATION/EDUCATION:   [x]         Fall prevention education was provided and the patient/caregiver indicated understanding. [x]         Patient/family have participated as able in goal setting and plan of care. [x]         Patient/family agree to work toward stated goals and plan of care. []         Patient understands intent and goals of therapy, but is neutral about his/her participation. []         Patient is unable to participate in goal setting and plan of care. Thank you for this referral.  Claudia Gee   Time Calculation: 15 mins      Mobility  Current  CM= 80-99%   Goal  CJ= 20-39%. The severity rating is based on the Level of Assistance required for Functional Mobility and ADLs.     Eval Complexity: History: MEDIUM  Complexity : 1-2 comorbidities / personal factors will impact the outcome/ POC Exam:MEDIUM Complexity : 3 Standardized tests and measures addressing body structure, function, activity limitation and / or participation in recreation  Presentation: MEDIUM Complexity : Evolving with changing characteristics  Clinical Decision Making:Medium Complexity   Overall Complexity:MEDIUM

## 2018-06-26 NOTE — ROUTINE PROCESS
Pt is uncomfortable. States she cannot breath unless she is sitting as high as the bed will allow. Pt wanted to sit on the side of the bed. Upon trying this, the pt was barely able to move her legs. The nurse sat the pt up on the side of the bed, pt immediately wanted to get on the bedpan. Nurse repositioned pt back into the bed and put the pt on the bedpan. Pt became SOB and began wheezing. A breathing tx was given. Pt was on the bedpan for 20 minutes. The nurse removed the bedpan and pt had no BM.      0100--Pt wanted to sit on the side of the bed. This nurse explained to the pt that last time she did that the pt became SOB and could barely move. Pt did not remember this but agreed not to sit on the side of the bed \"for now\". Pt stated she needed to use the bathroom. Pt was explained that she cannot get up to go to the bathroom because she is too weak and may fall. Pt finally agreed to try the bedpan again. Pt sat on the bedpan for 30 minutes then stated she was finished. Pt did not have a BM.      0300--Pt has been educated about using the purewick, the bedpan, and the call bell. Pt continues to be disruptive calling out for the nurse. Every time any nurse or CNA go into the room pt cannot remember that she is too weak to walk to the bathroom. Pt is now refusing to get on the bedpan but states that \"you are not letting me go to the bathroom\". Pt is becoming angry, yelling, and using inappropriate language. 0400--Notified MD of pt's behavior. Dr. Maddy Barron came to see the pt and addressed any of the pt's needs. 5--Pt continues previous behavior.

## 2018-06-26 NOTE — DIABETES MGMT
NUTRITIONAL ASSESSMENT GLYCEMIC CONTROL/ PLAN OF CARE     Audrey Ryderu Agent           66 y.o.           6/23/2018               No diagnosis found. INTERVENTIONS/PLAN:   Recommend addition of Lantus insulin 10 units     ASSESSMENT:   Pt is a 66year old female with a past medical history significant for A-fib, anemia, hypertension, liver disease, diabetes, and dementia. Pt from NH. Blood glucose elevated above targets. Pt is on a diabetic diet, intake varying. Pt is receiving correctional Lispro insulin coverage.      Diabetes Management:   Recent blood glucose:     6/25/2018 12:06 6/25/2018 15:59 6/25/2018 22:11 6/26/2018 07:39 6/26/2018 12:37   182 (H) 145 (H) 232 (H) 203 (H) 203 (H)   Within target range (non-ICU: <140; ICU<180): [] Yes   [x]  No    Current Insulin regimen:   Correctional Lispro insulin 4 times daily ACHS (very insulin resistant scale)  Home medication/insulin regimen:   Glipizide SR 10 mg two times daily   Metformin 1,000 mg two times daily   HbA1c: 8.8% (estimated average glucose of 206 mg/dL)  Adequate glycemic control PTA:  [] Yes  [x] No     SUBJECTIVE/OBJECTIVE:     Diet: Diabetic Consistent Carbohydrate     Patient Vitals for the past 100 hrs:   % Diet Eaten   06/25/18 1816 30 %   06/24/18 1728 0 %   06/23/18 1813 40 %     Medications: [x]   Reviewed     Most Recent POC Glucose:   Recent Labs      06/26/18   0318  06/25/18   0246  06/24/18   1110   GLU  185*  112*  166*      Labs:   Lab Results   Component Value Date/Time    Hemoglobin A1c 8.8 (H) 06/23/2018 05:40 AM     Lab Results   Component Value Date/Time    Sodium 133 (L) 06/26/2018 03:18 AM    Potassium 3.6 06/26/2018 03:18 AM    Chloride 100 06/26/2018 03:18 AM    CO2 23 06/26/2018 03:18 AM    Anion gap 10 06/26/2018 03:18 AM    Glucose 185 (H) 06/26/2018 03:18 AM    BUN 32 (H) 06/26/2018 03:18 AM    Creatinine 1.57 (H) 06/26/2018 03:18 AM    Calcium 8.6 06/26/2018 03:18 AM    Magnesium 1.6 06/24/2018 11:10 AM    Phosphorus 3.8 06/24/2018 11:10 AM    Albumin 3.0 (L) 06/23/2018 05:40 AM     Anthropometrics:   BMI (calculated): 37.4  Wt Readings from Last 1 Encounters:   06/26/18 102.1 kg (225 lb)      Ht Readings from Last 1 Encounters:   06/23/18 5' 5\" (1.651 m)     Estimated Nutrition Needs: 1725-7584 Kcal/day,  grams protein/day   Based on:   [x] Actual BW    [] IBW   [] Adjusted BW      Nutrition Diagnoses:    Altered nutrition related lab value related to diabetes as evidenced by hemoglobin A1c of 8.8 mg/dL  Nutrition Interventions: coordination of care   Goal: Blood glucose will be within target range of  mg/dL by 6/29/18     Nutrition Monitoring and Evaluation    []     Monitor po intake on meal rounds  [x]     Continue inpatient monitoring and intervention  []     Other:    Jaclyn Tobar RD, CDE  pgr [phone]

## 2018-06-26 NOTE — PROGRESS NOTES
Reason for Admission:   AFIB                   RRAT Score:    8                  Plan for utilizing home health: Yes                      Likelihood of Readmission:  Green/Low                         Transition of Care Plan:     Pt is confused. She is a resident of [facility]. CM contacted pt's daughter  Latanya Fuentes who prefers for pt to return to [facility] vs SNF placement. [facility] but was [facility] to . Message was left on voicemail to return call. Unit nurse shares that  may want for pt to be re-assessed prior to return. CM will continue with attempts to contact . Call made to  who shares that she will be to this facility to assess pt today. Pt and her daughter in room were informed.

## 2018-06-26 NOTE — CDMP QUERY
'Chest pain-this was in the setting of rapid atrial fibrillation. She may have underlying coronary disease this may have been from demand ischemia' documented on cardiology consult. Please clarify the diagnosis of 'demand ischemia' as it is unclear. Please clarify if this patient is being treated/managed for:    => Demand ischemia POA  or  => Demand ischemia ruled out  => Other Explanation of clinical findings  => Unable to Determine (no explanation of clinical findings)    The medical record reflects the following:  Risk:  -- admitted with afib, chest pain/tightness, SOB    Clinical Indicators:  -- 'Chest pain-this was in the setting of rapid atrial fibrillation. She may have underlying coronary disease this may have been from demand ischemia' documented on cardiology consult    -- H&P: Chest pain is supply and demand Vs ischemic episode     -- 6/23/2018 05:40: Troponin-I, Qt.: <0.02  -- 6/23/2018 12:00: Troponin-I, Qt.: 0.03    Treatment:   -- cardiology following:   * she is not a candidate for an invasive workup. She does have history of a low risk stress test from 2016 at [facility]. * We will continue oral amiodarone for now, would prefer starting metoprolol instead of diltiazem. This can be uptitrated if needed to help with rate control. * We will decrease her scheduled losartan dosage in order to up titrate her rate slowing agents. * Continue Eliquis for anticoagulation. This should be dosed 5 mg twice daily. Please clarify and document your clinical opinion in the progress notes and discharge summary including the definitive and/or presumptive diagnosis, (suspected or probable), related to the above clinical findings. Please include clinical findings supporting your diagnosis. If you DECLINE this query or would like to communicate with [facility], please utilize the \"[facility] message box\" at the TOP of the Progress Note on the right.       Thank you,  Delfin Bajwa [address] [facility], [address] [place] [address]

## 2018-06-26 NOTE — DISCHARGE SUMMARY
Transfer Summary    Patient: Amilcar Ramon MRN: [id number]  CSN: [id number]    YOB: 1940  Age: 66 y.o. Sex: female    DOA: 6/23/2018 LOS:  LOS: 3 days   Discharge Date:      Disposition: Transfer to SNF    Admission Diagnoses: AFIB  Atrial fibrillation with RVR ([facility] [address]  Atrial fibrillation with RVR [facility])    Discharge Diagnoses:  Please see Dictation. Additional diagnosis  1. Mild acute asthma exb, mild intermittent   2. CP POA, possible demand ischemia from A fib RVR    Discharge Condition: Stable    PHYSICAL EXAM  Visit Vitals    /72 (BP 1 Location: Left arm, BP Patient Position: At rest)    Pulse 100    Temp 97.4 °F (36.3 °C)    Resp 18    Ht 5' 5\" (1.651 m)    Wt 102.1 kg (225 lb)    SpO2 98%    BMI 37.44 kg/m2       General: Alert, cooperative, no acute distress    Lungs:  Bilaterally clear. Some Occ exp Wheezing, no Rhonchi/Rales. Heart:             Regular rate and Rhythm. Abdomen: Soft, Non distended, Non tender. + Bowel sounds. Extremities: No edema or cyanosis   Neurologic:  AA, oriented X 2. Moves all ext                                 Hospital Course: Please see dictation by Dr. Tim Jara. Pt had prerenal azotemia due to diuresis, Crt improved with IVF. Pt HR well controlled, cardio ok for transfer. Pt had mild wheezing this am, started on PO steroids and BD this am, she improved. Weaned off O2. Pt spo2 95% on RA but keeps asking for o2. Will cont steroids for next 5 days. D/w pt's daughter Ms Funes Party over phone, she ok for transfer to SNF, she wants full code       Current Discharge Medication List      START taking these medications    Details   famotidine (PEPCID) 20 mg tablet Take 1 Tab by mouth nightly. Qty: 15 Tab, Refills: 0      insulin lispro (HUMALOG) 100 unit/mL injection SubCUTAneous route Before breakfast, lunch, dinner and at bedtime.    For Blood Sugar (mg/dL) of:     Less than 150 =   0 units           150 -199 =   2 units  200 -249 =   4 units  250 -299 =   6 units  300 -349 =   8 units  350 and above =  10 units  Qty: 1 Vial, Refills: 0      predniSONE (DELTASONE) 20 mg tablet Take 1 Tab by mouth daily for 5 days. Qty: 5 Tab, Refills: 0      furosemide (LASIX) 20 mg tablet Take 1 Tab by mouth daily as needed. Qty: 30 Tab, Refills: 0      metoprolol tartrate (LOPRESSOR) 25 mg tablet Take 1 Tab by mouth every twelve (12) hours. Qty: 30 Tab, Refills: 0         CONTINUE these medications which have CHANGED    Details   amiodarone (CORDARONE) 200 mg tablet Take 1 Tab by mouth daily. Qty: 30 Tab, Refills: 0      apixaban (ELIQUIS) 5 mg tablet Take 1 Tab by mouth two (2) times a day. Qty: 60 Tab, Refills: 0      FLUoxetine (PROZAC) 20 mg tablet Take 2 Tabs by mouth nightly. Qty: 30 Tab, Refills: 0      losartan (COZAAR) 25 mg tablet Take 1 Tab by mouth daily. Qty: 30 Tab, Refills: 0      risperiDONE (RISPERDAL) 1 mg tablet Take 1 Tab by mouth nightly. Qty: 30 Tab, Refills: 0         CONTINUE these medications which have NOT CHANGED    Details   QUEtiapine (SEROQUEL) 25 mg tablet Take 12.5 mg by mouth two (2) times a day. simvastatin (ZOCOR) 10 mg tablet Take 10 mg by mouth nightly. glipiZIDE SR (GLUCOTROL XL) 10 mg CR tablet Take 10 mg by mouth two (2) times a day. 1 in the morning and 1 at bedtime. acetaminophen (TYLENOL) 325 mg tablet Take 650 mg by mouth every four (4) hours as needed for Pain or Fever. Menthol-Zinc Oxide (CALMOSEPTINE) 0.44-20.6 % oint Apply 1 g to affected area three (3) times daily as needed (For rash, apply to inner buttocks). montelukast (SINGULAIR) 10 mg tablet Take 10 mg by mouth daily. cholecalciferol (VITAMIN D3) 1,000 unit cap Take 2,000 Units by mouth daily. Daughter is unsure about the dosage. albuterol (PROVENTIL HFA, VENTOLIN HFA, PROAIR HFA) 90 mcg/actuation inhaler Take 1 Puff by inhalation every six (6) hours as needed (Asthma/cough).       albuterol (PROVENTIL VENTOLIN) 2.5 mg /3 mL (0.083 %) nebulizer solution 3 mL by Nebulization route every four (4) hours as needed for Wheezing. Qty: 24 Each, Refills: 0      glucagon (GLUCAGEN) 1 mg injection 1 mL by IntraMUSCular route as needed for Hypoglycemia. Qty: 1 Vial, Refills: 0      polyethylene glycol (MIRALAX) 17 gram/dose powder Take 17 g by mouth daily as needed. Indications: Constipation      prednisoLONE acetate (PRED FORTE) 1 % ophthalmic suspension Administer 1 Drop to left eye two (2) times a day. Please verify with  Patient's daughter the percentage. Thanks,      aspirin delayed-release 81 mg tablet Take 81 mg by mouth daily. mometasone (ASMANEX TWISTHALER) 220 mcg (120 doses) AePB inhaler Take 1 Puff by inhalation. Powder that patient inhales. 220mcg. Indications: MAINTENANCE THERAPY FOR ASTHMA      metFORMIN (GLUCOPHAGE) 1,000 mg tablet Take 1,000 mg by mouth two (2) times daily (with meals). STOP taking these medications       acetaminophen-codeine (TYLENOL-CODEINE #3) 300-30 mg per tablet Comments:   Reason for Stopping:         amLODIPine (NORVASC) 10 mg tablet Comments:   Reason for Stopping:               DIET:  Cardiac Diet and Diabetic Diet     ACTIVITY: Activity as tolerated  Patient needs to be on Fall, aspiration, decubitus precaution. ·                 PT/OT consult  ·                 Speech consult  ·                 Accuchecks  QAC and QHS     ADDITIONAL INFORMATION: If you experience any of the following symptoms but not limited to Fever, chills, nausea, vomiting, diarrhea, change in mentation, falling, bleeding, shortness of breath, chest pain, please call your primary care physician or return to the emergency room if you cannot get hold of your doctor:      FOLLOW UP CARE:  Follow-up with 1.  Physician at SNF in 1-2 days with Cbc with diff, Bmp, mg.                            2. Dr. Avel Busby, cardio in 3-4 weeks    Pt's PCP: Mendel Melo MD.    Minutes spent on discharge: >40 minutes spent coordinating this discharge (review instructions/follow-up, prescriptions, preparing report for sign off)    Jennifer Helton MD  6/26/2018 12:47 PM

## 2018-06-26 NOTE — PROGRESS NOTES
[facility]  -  Progress Note      Patient: Aditi Breen MRN: [id number]  SSN: [id number]    YOB: 1940  Age: 66 y.o. Sex: female      Admit Date: 6/23/2018    Hospital Problem List:     Hospital Problems  Date Reviewed: 6/23/2018          Codes Class Noted POA    * (Principal)Atrial fibrillation with RVR ([address] ICD-10-CM: I48.91  ICD-9-CM: 427.31  6/23/2018 Yes        Chronic anticoagulation ICD-10-CM: Z79.01  ICD-9-CM: V58.61  6/23/2018 Yes        Lower extremity edema ICD-10-CM: R60.0  ICD-9-CM: 782.3  6/23/2018 Unknown        Psychiatric disorder ICD-10-CM: F99  ICD-9-CM: 300.9  6/23/2018 Unknown            --Paroxysmal atrial fibrillation.  Prolonged history of this dating back for several years. Nestor Dorsey has been managed with Eliquis for oral anticoagulation and amiodarone for rhythm control.  In the past, she has been treated with both metoprolol and diltiazem to help with rate control as well.    Patient's heart rate now appears to be well-controlled on metoprolol and amiodarone.      --Valvular heart disease.  Echocardiogram done 6/23 demonstrated wide-open tricuspid regurgitation and moderate to severe mitral regurgitation.  Her LV function was difficult to assess, but her ejection fraction appeared to be grossly normal in the 50-55% range.  Historically her LV function has been normal.  This degree of regurgitation appears to be new.  She is not a candidate for any sort of valve surgery.  She does not appear to be in decompensated heart failure. --History of junctional bradycardia.  Because of this I would not be overly aggressive on her rate slowing agents. --Essential hypertension.  Patient blood pressure appears to be well-controlled. --Anemia.  This appears to be chronic with slightly worsening H&H. --Alzheimer's dementia.  Patient now resides at the [facility]. --Diabetes mellitus type 2. Assessment & Plan:     Continue current regimen.  Amio, Eliquis, aspirin, lopressor, and statin. Follow up with our office in 4 weeks. Subjective:     No new complaints. Objective:      Patient Vitals for the past 8 hrs:   Temp Pulse Resp BP SpO2   06/26/18 1233 97.4 °F (36.3 °C) 100 18 111/72 98 %   06/26/18 0832 97.3 °F (36.3 °C) (!) 102 20 122/73 97 %         Patient Vitals for the past 96 hrs:   Weight   06/26/18 0432 102.1 kg (225 lb)   06/25/18 0445 101.2 kg (223 lb)   06/24/18 0437 103.1 kg (227 lb 6.4 oz)   06/23/18 0600 101.6 kg (224 lb)         Intake/Output Summary (Last 24 hours) at 06/26/18 1348  Last data filed at 06/26/18 0425   Gross per 24 hour   Intake              930 ml   Output              250 ml   Net              680 ml       Physical Exam:  General: awake, and alert  Neck: supple  Lungs:  CTAB  Heart:  irreg rate and rhythm  Abdomen:  Soft, non-tender  Extremities:  No edema, atraumatic    Data Review:     Labs: Results:       Chemistry Recent Labs      06/26/18   0318  06/25/18   0246  06/24/18   1110   GLU  185*  112*  166*   NA  133*  133*  133*   K  3.6  3.7  4.1   CL  100  100  99*   CO2  23  22  23   BUN  32*  22*  17   CREA  1.57*  1.82*  1.38*   CA  8.6  7.8*  8.2*   MG   --    --   1.6   PHOS   --    --   3.8   AGAP  10  11  11   BUCR  20  12  12      CBC w/Diff Recent Labs      06/26/18   0318  06/25/18   0246  06/24/18   1110   WBC  5.9  6.6  7.7   RBC  3.40*  3.29*  3.36*   HGB  10.3*  10.1*  10.3*   HCT  31.1*  29.6*  30.0*   PLT  225  210  197   GRANS  76*  71  88*   LYMPH  16*  17*  7*   EOS  2  2  0      Cardiac Enzymes No results found for: CPK, CK, CKMMB, CKMB, RCK3, CKMBT, CKNDX, CKND1, NAMAN, TROPT, TROIQ, RONA, TROPT, TNIPOC, BNP, BNPP   Coagulation No results for input(s): PTP, INR, APTT in the last 72 hours.     No lab exists for component: INREXT    Lipid Panel Lab Results   Component Value Date/Time    Cholesterol, total 118 (L) 03/01/2017 09:54 AM    HDL Cholesterol 48 03/01/2017 09:54 AM    LDL, calculated 54 03/01/2017 09:54 AM    Triglyceride 78 03/01/2017 09:54 AM    CHOL/HDL Ratio 2.5 03/01/2017 09:54 AM      BNP Lab Results   Component Value Date/Time    BNP 88.0 04/12/2016 10:30 AM      Liver Enzymes No results for input(s): TP, ALB, TBIL, AP, SGOT, GPT in the last 72 hours.     No lab exists for component: DBIL   Digoxin    Thyroid Studies Lab Results   Component Value Date/Time    TSH 1.38 06/23/2018 05:40 AM            Signed By: KRISTIN Sampson     June 26, 2018

## 2018-06-26 NOTE — ROUTINE PROCESS
Bedside and Verbal shift change report given to Sal Heath RN (oncoming nurse) by Mark Garcia (offgoing nurse). Report included the following information SBAR, Kardex, MAR and Recent Results.     SITUATION:    Code Status: Full Code  Reason for Admission: AFIB  Atrial fibrillation with RVR ([address]   Atrial fibrillation with RVR ([address]    [facility] day: 3   Problem List:       Hospital Problems  Date Reviewed: 6/23/2018          Codes Class Noted POA    * (Principal)Atrial fibrillation with RVR ([address] ICD-10-CM: I48.91  ICD-9-CM: 427.31  6/23/2018 Yes        Chronic anticoagulation ICD-10-CM: Z79.01  ICD-9-CM: V58.61  6/23/2018 Yes        Lower extremity edema ICD-10-CM: R60.0  ICD-9-CM: 782.3  6/23/2018 Unknown        Psychiatric disorder ICD-10-CM: F99  ICD-9-CM: 300.9  6/23/2018 Unknown              BACKGROUND:    Past Medical History:   Past Medical History:   Diagnosis Date    Anemia NEC     Arthritis     Arthropathy, unspecified, site unspecified     Asthma     Atrial fibrillation ([address]     CHRONIC A-FIB    Diabetes mellitus     Essential hypertension     Heart abnormalities     irregular heartbeat    Hypertension     Liver disease     steatohepatitis    Neurological disorder     dementia    Osteoarthritis     LEFT HIP         Patient taking anticoagulants yes     ASSESSMENT:    Changes in Assessment Throughout Shift: none     Patient has Central Line: no Reasons if yes: n/a   Patient has Aden Cath: no Reasons if yes: n/a      Last Vitals:     Vitals:    06/25/18 1556 06/25/18 2040 06/26/18 0039 06/26/18 0432   BP: 101/64 118/68 129/71 104/78   Pulse: 80 90 95 96   Resp: 18 16 20 20   Temp: 97.4 °F (36.3 °C) 97.3 °F (36.3 °C) 97.5 °F (36.4 °C) 97.5 °F (36.4 °C)   SpO2: 94% 96% 96% 96%   Weight:    102.1 kg (225 lb)   Height:            IV and DRAINS (will only show if present)   External Female Catheter 06/23/18-Site Assessment: Clean, dry, & intact  [REMOVED] Peripheral IV 06/23/18 Right Antecubital-Site Assessment: Clean, dry, & intact  Peripheral IV 06/26/18 Right Wrist-Site Assessment: Clean, dry, & intact     WOUND (if present)   Wound Type:  none   Dressing present Dressing Present : No   Wound Concerns/Notes:  none     PAIN    Pain Assessment    Pain Intensity 1: 0 (06/26/18 0424)    Pain Location 1: Neck, Shoulder    Pain Intervention(s) 1: Medication (see MAR)    Patient Stated Pain Goal: 0  o Interventions for Pain:  none  o Intervention effective: n/a  o Time of last intervention: n/a   o Reassessment Completed: yes      Last 3 Weights:  Last 3 Recorded Weights in this Encounter    06/24/18 0437 06/25/18 0445 06/26/18 0432   Weight: 103.1 kg (227 lb 6.4 oz) 101.2 kg (223 lb) 102.1 kg (225 lb)     Weight change: 0.907 kg (2 lb)     INTAKE/OUPUT    Current Shift:      Last three shifts: 06/24 1901 - 06/26 0700  In: 980 [P.O.:530; I.V.:450]  Out: 250 [Urine:250]     LAB RESULTS     Recent Labs      06/26/18   0318  06/25/18   0246  06/24/18   1110   WBC  5.9  6.6  7.7   HGB  10.3*  10.1*  10.3*   HCT  31.1*  29.6*  30.0*   PLT  225  210  197        Recent Labs      06/26/18   0318  06/25/18   0246  06/24/18   1110   NA  133*  133*  133*   K  3.6  3.7  4.1   GLU  185*  112*  166*   BUN  32*  22*  17   CREA  1.57*  1.82*  1.38*   CA  8.6  7.8*  8.2*   MG   --    --   1.6       RECOMMENDATIONS AND DISCHARGE PLANNING     1. Pending tests/procedures/ Plan of Care or Other Needs: continue to monitor     2. Discharge plan for patient and Needs/Barriers: [facility]    3. Estimated Discharge Date: tbd Posted on Whiteboard in Patients Room: no      4. The patient's care plan was reviewed with the oncoming nurse.        \"HEALS\" SAFETY CHECK      Fall Risk    Total Score: 3    Safety Measures: Safety Measures: Bed/Chair-Wheels locked, Bed in low position, Call light within reach    A safety check occurred in the patient's room between off going nurse and oncoming nurse listed above.    The safety check included the below items  Area Items   H  High Alert Medications - Verify all high alert medication drips (heparin, PCA, etc.)   E  Equipment - Suction is set up for ALL patients (with ted)  - Red plugs utilized for all equipment (IV pumps, etc.)  - WOWs wiped down at end of shift.  - Room stocked with oxygen, suction, and other unit-specific supplies   A  Alarms - Bed alarm is set for fall risk patients  - Ensure chair alarm is in place and activated if patient is up in a chair   L  Lines - Check IV for any infiltration  - Aden bag is empty if patient has a Aden   - Tubing and IV bags are labeled   S  Safety   - Room is clean, patient is clean, and equipment is clean. - Hallways are clear from equipment besides carts. - Fall bracelet on for fall risk patients  - Ensure room is clear and free of clutter  - Suction is set up for ALL patients (with ted)  - Hallways are clear from equipment besides carts.    - Isolation precautions followed, supplies available outside room, sign posted     Eugenie Adler

## 2018-06-26 NOTE — DISCHARGE INSTRUCTIONS
DISCHARGE SUMMARY from Nurse    PATIENT INSTRUCTIONS:    After general anesthesia or intravenous sedation, for 24 hours or while taking prescription Narcotics:  · Limit your activities  · Do not drive and operate hazardous machinery  · Do not make important personal or business decisions  · Do  not drink alcoholic beverages  · If you have not urinated within 8 hours after discharge, please contact your surgeon on call. Report the following to your surgeon:  · Excessive pain, swelling, redness or odor of or around the surgical area  · Temperature over 100.5  · Nausea and vomiting lasting longer than 4 hours or if unable to take medications  · Any signs of decreased circulation or nerve impairment to extremity: change in color, persistent  numbness, tingling, coldness or increase pain  · Any questions    What to do at Home:  Recommended activity: Activity as tolerated,     If you experience any of the following symptoms Shortness of breath,Dizziness  please follow up with Primary Care Provider. *  Please give a list of your current medications to your Primary Care Provider. *  Please update this list whenever your medications are discontinued, doses are      changed, or new medications (including over-the-counter products) are added. *  Please carry medication information at all times in case of emergency situations. These are general instructions for a healthy lifestyle:    No smoking/ No tobacco products/ Avoid exposure to second hand smoke  Surgeon General's Warning:  Quitting smoking now greatly reduces serious risk to your health.     Obesity, smoking, and sedentary lifestyle greatly increases your risk for illness    A healthy diet, regular physical exercise & weight monitoring are important for maintaining a healthy lifestyle    You may be retaining fluid if you have a history of heart failure or if you experience any of the following symptoms:  Weight gain of 3 pounds or more overnight or 5 pounds in a week, increased swelling in our hands or feet or shortness of breath while lying flat in bed. Please call your doctor as soon as you notice any of these symptoms; do not wait until your next office visit. Recognize signs and symptoms of STROKE:    F-face looks uneven    A-arms unable to move or move unevenly    S-speech slurred or non-existent    T-time-call 911 as soon as signs and symptoms begin-DO NOT go       Back to bed or wait to see if you get better-TIME IS BRAIN. Warning Signs of HEART ATTACK     Call 911 if you have these symptoms:   Chest discomfort. Most heart attacks involve discomfort in the center of the chest that lasts more than a few minutes, or that goes away and comes back. It can feel like uncomfortable pressure, squeezing, fullness, or pain.  Discomfort in other areas of the upper body. Symptoms can include pain or discomfort in one or both arms, the back, neck, jaw, or stomach.  Shortness of breath with or without chest discomfort.  Other signs may include breaking out in a cold sweat, nausea, or lightheadedness. Don't wait more than five minutes to call 911 - MINUTES MATTER! Fast action can save your life. Calling 911 is almost always the fastest way to get lifesaving treatment. Emergency Medical Services staff can begin treatment when they arrive -- up to an hour sooner than if someone gets to the hospital by car. The discharge information has been reviewed with the patient. The patient verbalized understanding. Discharge medications reviewed with the patient and appropriate educational materials and side effects teaching were provided. [employer] Activation    Thank you for requesting access to [employer]. Please follow the instructions below to securely access and download your online medical record. [employer] allows you to send messages to your doctor, view your test results, renew your prescriptions, schedule appointments, and more. How Do I Sign Up? 1.  In your internet browser, go to [email or web address]  2. Click on the First Time User? Click Here link in the Sign In box. You will be redirect to the New Member Sign Up page. 3. Enter your [employer] Access Code exactly as it appears below. You will not need to use this code after youve completed the sign-up process. If you do not sign up before the expiration date, you must request a new code. [employer] Access Code: [email or web address]  Expires: 2018  6:05 PM (This is the date your [employer] access code will )    4. Enter the last four digits of your Social Security Number (xxxx) and Date of Birth (mm/dd/yyyy) as indicated and click Submit. You will be taken to the next sign-up page. 5. Create a [employer] ID. This will be your [employer] login ID and cannot be changed, so think of one that is secure and easy to remember. 6. Create a [employer] password. You can change your password at any time. 7. Enter your Password Reset Question and Answer. This can be used at a later time if you forget your password. 8. Enter your e-mail address. You will receive e-mail notification when new information is available in [address] 9. Click Sign Up. You can now view and download portions of your medical record. 10. Click the Download Summary menu link to download a portable copy of your medical information. Additional Information    If you have questions, please visit the Frequently Asked Questions section of the [employer] website at [email or web address] Remember, [employer] is NOT to be used for urgent needs. For medical emergencies, dial 911.       Patient armband removed and shredded    ___________________________________________________________________________________________________________________________________

## 2018-06-26 NOTE — PROGRESS NOTES
Care Management Specialist reviewed over the Important Medicare Rights Letter with patient. Patient signed the letter. Patient given a copy of letter by bedside. Signed copy placed into patient chart

## 2018-07-26 PROBLEM — J18.9 HCAP (HEALTHCARE-ASSOCIATED PNEUMONIA): Status: ACTIVE | Noted: 2018-01-01

## 2018-07-26 NOTE — ED TRIAGE NOTES
The patient presents from [address] [place] [address] for evaluation of altered mental status and a fever. She is currently awake, alert, oriented to person only.

## 2018-07-26 NOTE — ED PROVIDER NOTES
EMERGENCY DEPARTMENT HISTORY AND PHYSICAL EXAM    1:51 PM      Date: 7/26/2018  Patient Name: Ronni Garibay    History of Presenting Illness     Chief Complaint   Patient presents with    Altered mental status         History Provided By: EMS History limited due to patient's altered mental status. Chief Complaint: Altered mental status  Duration: 2  Days  Timing:  Constant  Location: Generalized  Quality: \"Not normal\"  Severity: Moderate       Additional History (Context): Ronni Garibay is a 66 y.o. female with a past medical history of DM, HTN, high cholesterol, asthma, dementia, and is on Amiodarone and Eliquis and Lopressor for a-fib who presents, via EMS. History limited due to patient's altered mental status. Per EMS, patient was picked up from [facility] for evaluation of altered mental status and a fever. Medics say that the patient has not been normal since discharged from a hospital admission 2 days ago, where she was admitted to [facility] from [place] for CHF exacerbation. EMS report that the patient feels warm to the touch, tachycardic, with normal blood pressure, elevated respiratory rate, clear lung sounds, with no known infections or sores, and with a blood sugar of 123. No other complaints.       PCP: Sherly Stiles MD    Current Facility-Administered Medications   Medication Dose Route Frequency Provider Last Rate Last Dose    sodium chloride (NS) flush 5-10 mL  5-10 mL IntraVENous PRN Citlaly Brito MD        piperacillin-tazobactam (ZOSYN) 3.375 g in 0.9% sodium chloride (MBP/ADV) 100 mL ADV  3.375 g IntraVENous Home Huff MD   Stopped at 07/26/18 1550    [START ON 7/27/2018] vancomycin (VANCOCIN) 1,000 mg in 0.9% sodium chloride (MBP/ADV) 250 mL adv  1,000 mg IntraVENous Q12H Citlaly Brito MD        potassium chloride (KLOR-CON) packet 40 mEq  40 mEq Oral ONCE Farrukh Perez MD        [START ON 7/27/2018] magnesium oxide (MAG-OX) tablet 400 mg  400 mg Oral DAILY Brayan Carmichael MD Theodora         Current Outpatient Prescriptions   Medication Sig Dispense Refill    acetaminophen (TYLENOL) 325 mg tablet Take 2 Tabs by mouth every six (6) hours as needed for Pain or Fever. Indications: Arthritic Pain, Fever 30 Tab 0    albuterol (PROVENTIL HFA, VENTOLIN HFA, PROAIR HFA) 90 mcg/actuation inhaler Take 1 Puff by inhalation every six (6) hours as needed (Asthma/cough). Indications: Acute Asthma Attack 1 Inhaler 0    albuterol (PROVENTIL VENTOLIN) 2.5 mg /3 mL (0.083 %) nebulizer solution 3 mL by Nebulization route every four (4) hours as needed for Wheezing. Indications: Chronic Obstructive Pulmonary Disease 24 Each 0    amiodarone (CORDARONE) 200 mg tablet Take 1 Tab by mouth daily. Indications: Cardioversion of Atrial Fibrillation 30 Tab 0    apixaban (ELIQUIS) 5 mg tablet Take 1 Tab by mouth two (2) times a day. Indications: Cerebral Thromboembolism Prevention 60 Tab 0    aspirin delayed-release 81 mg tablet Take 1 Tab by mouth daily. Indications: CAD 30 Tab 0    cholecalciferol (VITAMIN D3) 1,000 unit cap Take 2 Caps by mouth daily. Daughter is unsure about the dosage. Indications: Osteoporosis, Vitamin D Deficiency 30 Cap 0    famotidine (PEPCID) 20 mg tablet Take 1 Tab by mouth nightly. Indications: gastroesophageal reflux disease 15 Tab 0    FLUoxetine (PROZAC) 20 mg tablet Take 2 Tabs by mouth nightly. Indications: ANXIETY WITH DEPRESSION 30 Tab 0    furosemide (LASIX) 20 mg tablet Take 1 Tab by mouth daily as needed. Indications: Edema 30 Tab 0    glipiZIDE SR (GLUCOTROL XL) 10 mg CR tablet Take 1 Tab by mouth two (2) times a day. 1 in the morning and 1 at bedtime. Indications: type 2 diabetes mellitus 30 Tab 0    insulin lispro (HUMALOG) 100 unit/mL injection SubCUTAneous route Before breakfast, lunch, dinner and at bedtime.    For Blood Sugar (mg/dL) of:     Less than 150 =   0 units           150 -199 =   2 units  200 -249 =   4 units  250 -299 =   6 units  300 -349 = 8 units  350 and above =  10 units  Indications: type 2 diabetes mellitus 1 Vial 0    losartan (COZAAR) 25 mg tablet Take 1 Tab by mouth daily. Indications: hypertension 30 Tab 0    Menthol-Zinc Oxide (CALMOSEPTINE) 0.44-20.6 % oint Apply 1 g to affected area three (3) times daily as needed (For rash, apply to inner buttocks). Indications: Diaper Rash 1 Tube 0    metFORMIN (GLUCOPHAGE) 1,000 mg tablet Take 1 Tab by mouth two (2) times daily (with meals). Indications: type 2 diabetes mellitus 60 Tab 0    metoprolol tartrate (LOPRESSOR) 25 mg tablet Take 1 Tab by mouth every twelve (12) hours. Indications: VENTRICULAR RATE CONTROL IN ATRIAL FIBRILLATION 30 Tab 0    mometasone (ASMANEX TWISTHALER) 220 mcg (120 doses) aepb inhaler Take 1 Puff by inhalation daily. Powder that patient inhales. 220mcg. Indications: MAINTENANCE THERAPY FOR ASTHMA 120 Cap 0    montelukast (SINGULAIR) 10 mg tablet Take 1 Tab by mouth daily. Indications: MAINTENANCE THERAPY FOR ASTHMA 30 Tab 0    polyethylene glycol (MIRALAX) 17 gram/dose powder Take 17 g by mouth daily as needed. Indications: constipation 510 g 0    prednisoLONE acetate (PRED FORTE) 1 % ophthalmic suspension Administer 1 Drop to left eye two (2) times a day. Please verify with  Patient's daughter the percentage. Thanks,   Indications: PREVENTION OF CYTARABINE-INDUCED KERATOCONJUNCTIVITIS 5 mL 0    QUEtiapine (SEROQUEL) 25 mg tablet Take 0.5 Tabs by mouth two (2) times a day. Indications: Generalized Anxiety Disorder 30 Tab 0    risperiDONE (RISPERDAL) 1 mg tablet Take 1 Tab by mouth nightly. Indications: BIPOLAR DISORDER IN REMISSION, DEPRESSION ASSOCIATED WITH BIPOLAR DISORDER 30 Tab 0    simvastatin (ZOCOR) 10 mg tablet Take 1 Tab by mouth nightly. Indications: hypertriglyceridemia 30 Tab 0    risperiDONE (RISPERDAL) 1 mg tablet Take 1 Tab by mouth nightly.  30 Tab 0       Past History     Past Medical History:  Past Medical History:   Diagnosis Date    Anemia NEC     Arthritis     Arthropathy, unspecified, site unspecified     Asthma     Atrial fibrillation ([facility] [address]     CHRONIC A-FIB    Diabetes mellitus     Essential hypertension     Heart abnormalities     irregular heartbeat    Hypertension     Liver disease     steatohepatitis    Neurological disorder     dementia    Osteoarthritis     LEFT HIP       Past Surgical History:  Past Surgical History:   Procedure Laterality Date    Shannan Horton HIP REPLACEMENT  11/2014    HX OTHER SURGICAL  5/27/12    colon mass removed    HX TUBAL LIGATION         Family History:  Family History   Problem Relation Age of Onset    Hypertension Mother     Diabetes Father     Diabetes Daughter        Social History:  Social History   Substance Use Topics    Smoking status: Never Smoker    Smokeless tobacco: Never Used    Alcohol use No       Allergies:   Allergies   Allergen Reactions    Compazine [Prochlorperazine Edisylate] Other (comments)     Twitching, seizure-like symptoms         Review of Systems       Review of Systems   Unable to perform ROS: Mental status change         Physical Exam     Patient Vitals for the past 12 hrs:   Temp Pulse Resp BP SpO2   07/26/18 1915 - (!) 118 14 118/63 98 %   07/26/18 1900 - (!) 120 16 118/77 97 %   07/26/18 1845 - (!) 117 16 (!) 131/94 98 %   07/26/18 1830 - (!) 117 15 106/55 97 %   07/26/18 1815 - (!) 112 14 121/74 96 %   07/26/18 1800 - (!) 119 15 (!) 142/128 97 %   07/26/18 1745 - (!) 115 18 139/89 97 %   07/26/18 1730 - (!) 107 15 123/82 97 %   07/26/18 1715 - (!) 120 17 122/84 97 %   07/26/18 1700 - (!) 120 20 (!) 122/102 96 %   07/26/18 1645 - (!) 116 20 (!) 138/94 96 %   07/26/18 1630 - (!) 109 17 131/87 96 %   07/26/18 1615 - (!) 116 19 131/78 98 %   07/26/18 1600 - (!) 119 20 (!) 133/100 96 %   07/26/18 1545 - (!) 118 23 (!) 133/100 96 %   07/26/18 1530 - (!) 116 19 (!) 131/93 97 %   07/26/18 1445 - (!) 118 20 119/81 96 %   07/26/18 1430 - (!) 117 20 122/66 95 %   07/26/18 1415 - (!) 118 17 124/84 97 %   07/26/18 1413 99 °F (37.2 °C) (!) 122 17 131/89 96 %   07/26/18 1400 - (!) 123 23 (!) 131/99 97 %           Physical Exam   Constitutional: She appears well-developed. HENT:   Head: Normocephalic and atraumatic. Eyes: Conjunctivae and EOM are normal.   Neck: Normal range of motion. Cardiovascular: Normal heart sounds. Tachycardia present. Exam reveals no gallop and no friction rub. No murmur heard. Pulmonary/Chest: No stridor. Crackles at baselines, tachypneic to the 30's   Abdominal: Soft. There is no tenderness. Musculoskeletal: Normal range of motion. She exhibits no edema or tenderness. No lower extremity swelling. Neurological:   Altered. Repeating my name. Oriented only to self. Skin: Skin is warm and dry. She is not diaphoretic. Nursing note and vitals reviewed. Diagnostic Study Results     Labs -  Recent Results (from the past 12 hour(s))   METABOLIC PANEL, COMPREHENSIVE    Collection Time: 07/26/18  1:50 PM   Result Value Ref Range    Sodium 140 136 - 145 mmol/L    Potassium 2.7 (LL) 3.5 - 5.5 mmol/L    Chloride 104 100 - 108 mmol/L    CO2 24 21 - 32 mmol/L    Anion gap 12 3.0 - 18 mmol/L    Glucose 189 (H) 74 - 99 mg/dL    BUN 9 7.0 - 18 MG/DL    Creatinine 0.89 0.6 - 1.3 MG/DL    BUN/Creatinine ratio 10 (L) 12 - 20      GFR est AA >60 >60 ml/min/1.73m2    GFR est non-AA >60 >60 ml/min/1.73m2    Calcium 8.3 (L) 8.5 - 10.1 MG/DL    Bilirubin, total 1.0 0.2 - 1.0 MG/DL    ALT (SGPT) 9 (L) 13 - 56 U/L    AST (SGOT) 21 15 - 37 U/L    Alk.  phosphatase 80 45 - 117 U/L    Protein, total 6.8 6.4 - 8.2 g/dL    Albumin 2.9 (L) 3.4 - 5.0 g/dL    Globulin 3.9 2.0 - 4.0 g/dL    A-G Ratio 0.7 (L) 0.8 - 1.7     CBC WITH AUTOMATED DIFF    Collection Time: 07/26/18  1:50 PM   Result Value Ref Range    WBC 8.2 4.6 - 13.2 K/uL    RBC 3.68 (L) 4.20 - 5.30 M/uL    HGB 10.9 (L) 12.0 - 16.0 g/dL    HCT 32.6 (L) 35.0 - 45.0 %    MCV 88.6 74.0 - 97.0 FL MCH 29.6 24.0 - 34.0 PG    MCHC 33.4 31.0 - 37.0 g/dL    RDW 14.1 11.6 - 14.5 %    PLATELET 272 754 - 144 K/uL    MPV 9.9 9.2 - 11.8 FL    NEUTROPHILS 84 (H) 42 - 75 %    LYMPHOCYTES 9 (L) 20 - 51 %    MONOCYTES 7 2 - 9 %    EOSINOPHILS 0 0 - 5 %    BASOPHILS 0 0 - 3 %    ABS. NEUTROPHILS 6.9 1.8 - 8.0 K/UL    ABS. LYMPHOCYTES 0.7 (L) 0.8 - 3.5 K/UL    ABS. MONOCYTES 0.6 0 - 1.0 K/UL    ABS. EOSINOPHILS 0.0 0.0 - 0.4 K/UL    ABS.  BASOPHILS 0.0 0.0 - 0.06 K/UL    DF MANUAL      PLATELET COMMENTS ADEQUATE PLATELETS      RBC COMMENTS        ANISOCYTOSIS  SLIGHT  POIKILOCYTOSIS  SLIGHT  POLYCHROMASIA  SLIGHT  NORMOCYTIC, NORMOCHROMIC     CARDIAC PANEL,(CK, CKMB & TROPONIN)    Collection Time: 07/26/18  1:50 PM   Result Value Ref Range     26 - 192 U/L    CK - MB 2.0 <3.6 ng/ml    CK-MB Index 1.7 0.0 - 4.0 %    Troponin-I, Qt. 0.09 (H) 0.0 - 0.045 NG/ML   NT-PRO BNP    Collection Time: 07/26/18  1:50 PM   Result Value Ref Range    NT pro-BNP 6620 (H) 0 - 1800 PG/ML   TSH 3RD GENERATION    Collection Time: 07/26/18  1:50 PM   Result Value Ref Range    TSH 2.00 0.36 - 3.74 uIU/mL   MAGNESIUM    Collection Time: 07/26/18  1:50 PM   Result Value Ref Range    Magnesium 1.2 (L) 1.6 - 2.6 mg/dL   POC LACTIC ACID    Collection Time: 07/26/18  2:30 PM   Result Value Ref Range    Lactic Acid (POC) 1.8 0.4 - 2.0 mmol/L   URINALYSIS W/ RFLX MICROSCOPIC    Collection Time: 07/26/18  4:00 PM   Result Value Ref Range    Color YELLOW      Appearance CLEAR      Specific gravity 1.016 1.005 - 1.030      pH (UA) 6.0 5.0 - 8.0      Protein TRACE (A) NEG mg/dL    Glucose NEGATIVE  NEG mg/dL    Ketone TRACE (A) NEG mg/dL    Bilirubin NEGATIVE  NEG      Blood NEGATIVE  NEG      Urobilinogen 1.0 0.2 - 1.0 EU/dL    Nitrites NEGATIVE  NEG      Leukocyte Esterase NEGATIVE  NEG     URINE MICROSCOPIC ONLY    Collection Time: 07/26/18  4:00 PM   Result Value Ref Range    WBC NONE 0 - 4 /hpf    RBC NONE 0 - 5 /hpf    Epithelial cells FEW 0 - 5 /lpf    Bacteria FEW (A) NEG /hpf       Radiologic Studies -   CT HEAD WO CONT   Final Result      XR CHEST PORT   Final Result        Ct Head Wo Cont  Result Date: 7/26/2018  IMPRESSION: No acute hemorrhage or midline shift. Mild diffuse atrophy. No evidence for fracture in the calvarium. Mild inflammatory changes right sphenoid sinus. Delorse Forreston Chest Port  Result Date: 7/26/2018  IMPRESSION: Right greater than left pleural effusions. Probable underlying bilateral lower lobe infiltrates or atelectasis. Moderate cardiomegaly      Medical Decision Making     Provider Notes (Medical Decision Making): Pt with recent CHF exacerbation where she was heavily diuresed as she currently is hypokalemic and hypomagnesic, but not fluid overloaded. Here she is altered and tachypneic so sepsis bundle was started early. Cannot give excessive fluids given tenuous fluid status given recent week plus admit for diuresis. Broad spec abx, and vanc is large volume, replete mag and K, admit to step down for further iv abx. CT head neg for acute pathology, AMS likely due to sepsis. I am the first provider for this patient. I reviewed the vital signs, available nursing notes, past medical history, past surgical history, family history and social history. Vital Signs-Reviewed the patient's vital signs. Pulse Oximetry Analysis -  96% on room air (normal)    Records Reviewed: Nursing Notes (Time of Review: 1:51 PM)    ED Course: Progress Notes, Reevaluation, and Consults:      1:45 PM - I suspect that this patient has an active infection. The patient met criteria for sepsis at this time with pulmonary source. She does NOT have severe sepsis currently with lactate of 1.8 and adequate BPs.        PROVIDER SEPSIS PHYSICAL EXAM EVAL  Vital signs reviewed (see nursing documentation for further details):  Vitals:    07/26/18 1830 07/26/18 1845 07/26/18 1900 07/26/18 1915   BP: 106/55 (!) 131/94 118/77 118/63   Pulse: (!) 117 (!) 117 (!) 120 (!) 118   Resp: 15 16 16 14   Temp:       SpO2: 97% 98% 97% 98%       Cardiac exam:Tachycardic    Pulmonary exam:Increased work of breathing    Peripheral pulses:Normal    Capillary refill:Normal    Skin exam:pink    Exam performed byMiguel Ring MD    SEP-1 Core Measure Exclusion Criteria  Can not give fluids due to severe CHF.      4:03 PM  I talked to patient's family, and they confirm that the patient did go to [facility] for CHF exacerbation. They state that she was doing well and diuresed well and went straight to nursing facility after discharge. The patient did have a UTI while at [facility] agree that the patient's mental status is more altered than usual.       5:28 PM Consult: I discussed care with Dr. Cristhian Buchanan (hospitalist). It was a standard discussion including patient history, chief complaint, available diagnostic results, and predicted treatment course. Dr. Cristhian Buchanan accept patient for admission      Critical Care Time:  The services I provided to this patient were to treat and/or prevent clinically significant deterioration that could result in the failure of one or more body systems and/or organ systems due to sepsis. Services included the following:  -reviewing nursing notes and old charts  -vital sign assessments  -direct patient care  -medication orders and management  -interpreting and reviewing diagnostic studies/labs  -re-evaluations  -documentation time    Aggregate critical care time was 35 minutes, which includes only time during which I was engaged in work directly related to the patient's care as described above, whether I was at bedside or elsewhere in the Emergency Department. It did not include time spent performing other reported procedures or the services of residents, students, nurses, or advance practice providers. Balwinder Smith MD    8:01 PM        Diagnosis     Clinical Impression:   1. HCAP (healthcare-associated pneumonia)    2. Hypokalemia    3. Hypomagnesemia    4. Congestive heart failure, unspecified HF chronicity, unspecified heart failure type ([facility]        Disposition: admitted    Follow-up Information     None           Patient's Medications   Start Taking    No medications on file   Continue Taking    ACETAMINOPHEN (TYLENOL) 325 MG TABLET    Take 2 Tabs by mouth every six (6) hours as needed for Pain or Fever. Indications: Arthritic Pain, Fever    ALBUTEROL (PROVENTIL HFA, VENTOLIN HFA, PROAIR HFA) 90 MCG/ACTUATION INHALER    Take 1 Puff by inhalation every six (6) hours as needed (Asthma/cough). Indications: Acute Asthma Attack    ALBUTEROL (PROVENTIL VENTOLIN) 2.5 MG /3 ML (0.083 %) NEBULIZER SOLUTION    3 mL by Nebulization route every four (4) hours as needed for Wheezing. Indications: Chronic Obstructive Pulmonary Disease    AMIODARONE (CORDARONE) 200 MG TABLET    Take 1 Tab by mouth daily. Indications: Cardioversion of Atrial Fibrillation    APIXABAN (ELIQUIS) 5 MG TABLET    Take 1 Tab by mouth two (2) times a day. Indications: Cerebral Thromboembolism Prevention    ASPIRIN DELAYED-RELEASE 81 MG TABLET    Take 1 Tab by mouth daily. Indications: CAD    CHOLECALCIFEROL (VITAMIN D3) 1,000 UNIT CAP    Take 2 Caps by mouth daily. Daughter is unsure about the dosage. Indications: Osteoporosis, Vitamin D Deficiency    FAMOTIDINE (PEPCID) 20 MG TABLET    Take 1 Tab by mouth nightly. Indications: gastroesophageal reflux disease    FLUOXETINE (PROZAC) 20 MG TABLET    Take 2 Tabs by mouth nightly. Indications: ANXIETY WITH DEPRESSION    FUROSEMIDE (LASIX) 20 MG TABLET    Take 1 Tab by mouth daily as needed. Indications: Edema    GLIPIZIDE SR (GLUCOTROL XL) 10 MG CR TABLET    Take 1 Tab by mouth two (2) times a day. 1 in the morning and 1 at bedtime. Indications: type 2 diabetes mellitus    INSULIN LISPRO (HUMALOG) 100 UNIT/ML INJECTION    SubCUTAneous route Before breakfast, lunch, dinner and at bedtime.    For Blood Sugar (mg/dL) of:     Less than 150 =   0 units           150 -199 =   2 units  200 -249 =   4 units  250 -299 =   6 units  300 -349 =   8 units  350 and above =  10 units  Indications: type 2 diabetes mellitus    LOSARTAN (COZAAR) 25 MG TABLET    Take 1 Tab by mouth daily. Indications: hypertension    MENTHOL-ZINC OXIDE (CALMOSEPTINE) 0.44-20.6 % OINT    Apply 1 g to affected area three (3) times daily as needed (For rash, apply to inner buttocks). Indications: Diaper Rash    METFORMIN (GLUCOPHAGE) 1,000 MG TABLET    Take 1 Tab by mouth two (2) times daily (with meals). Indications: type 2 diabetes mellitus    METOPROLOL TARTRATE (LOPRESSOR) 25 MG TABLET    Take 1 Tab by mouth every twelve (12) hours. Indications: VENTRICULAR RATE CONTROL IN ATRIAL FIBRILLATION    MOMETASONE (ASMANEX TWISTHALER) 220 MCG (120 DOSES) AEPB INHALER    Take 1 Puff by inhalation daily. Powder that patient inhales. 220mcg. Indications: MAINTENANCE THERAPY FOR ASTHMA    MONTELUKAST (SINGULAIR) 10 MG TABLET    Take 1 Tab by mouth daily. Indications: MAINTENANCE THERAPY FOR ASTHMA    POLYETHYLENE GLYCOL (MIRALAX) 17 GRAM/DOSE POWDER    Take 17 g by mouth daily as needed. Indications: constipation    PREDNISOLONE ACETATE (PRED FORTE) 1 % OPHTHALMIC SUSPENSION    Administer 1 Drop to left eye two (2) times a day. Please verify with  Patient's daughter the percentage. Thanks,   Indications: PREVENTION OF CYTARABINE-INDUCED KERATOCONJUNCTIVITIS    QUETIAPINE (SEROQUEL) 25 MG TABLET    Take 0.5 Tabs by mouth two (2) times a day. Indications: Generalized Anxiety Disorder    RISPERIDONE (RISPERDAL) 1 MG TABLET    Take 1 Tab by mouth nightly. RISPERIDONE (RISPERDAL) 1 MG TABLET    Take 1 Tab by mouth nightly. Indications: BIPOLAR DISORDER IN REMISSION, DEPRESSION ASSOCIATED WITH BIPOLAR DISORDER    SIMVASTATIN (ZOCOR) 10 MG TABLET    Take 1 Tab by mouth nightly.  Indications: hypertriglyceridemia   These Medications have changed    No medications on file   Stop Taking    No medications on file     _______________________________    Attestations:  Scribe Attestation     Mirlande Benson acting as a scribe for and in the presence of Jamarcus Jameson MD      July 26, 2018 at 1:51 PM       Provider Attestation:      I personally performed the services described in the documentation, reviewed the documentation, as recorded by the scribe in my presence, and it accurately and completely records my words and actions.  July 26, 2018 at 1:51 PM - Jamarcus Jameson MD    _______________________________

## 2018-07-26 NOTE — ED NOTES
Resting on stretcher with eyes closed. Respirations regular and unlabored. Awaiting inpatient bed assignment. Explanation of wait provided to the patient. VSS, refer to flow sheet. Will continue to monitor.

## 2018-07-26 NOTE — ED NOTES
Bedside shift report given to Dru Murray RN. Patient resting on stretcher, INAD.  VSS, refer to flow sheet. Family at bedside.

## 2018-07-26 NOTE — IP AVS SNAPSHOT
[address] Patient: Toya Crowder MRN: [id number] FOW:[id number] A check den indicates which time of day the medication should be taken. My Medications START taking these medications Instructions Each Dose to Equal  
 Morning Noon Evening Bedtime  
 atropine 1 % ophthalmic solution 1 Drop by SubLINGual route every four (4) hours as needed. 1 Drop  
    
   
   
   
  
 doxycycline 100 mg capsule Commonly known as:  VIBRAMYCIN Take 1 Cap by mouth every twelve (12) hours for 2 days. 100 mg  
    
  
   
   
  
   
  
 insulin aspart U-100 100 unit/mL injection Commonly known as:  Mu Bangura Normal Insulin Sensitivity  For Blood Sugar (mg/dL) of:    Less than 150 =   0 units          150 -199 =   2 units 200 -249 =   4 units 250 -299 =   6 units 300 -349 =   8 units 350 and above =   10 units  
     
   
   
   
  
 insulin glargine 100 unit/mL injection Commonly known as:  LANTUS  
   
 18 Units by SubCUTAneous route Daily (before dinner). 18 Units LORazepam 2 mg/mL concentrated solution Commonly known as:  LORazepam Intensol Take 0.5 mL by mouth every four (4) hours as needed for Agitation, Anxiety or Other (shortness of breath). Max Daily Amount: 6 mg.  
 1 mg  
    
   
   
   
  
 morphine 100 mg/5 mL (20 mg/mL) concentrated solution Commonly known as:  Ethelle Hotter Take 0.5 mL by mouth every two (2) hours as needed for Pain. Max Daily Amount: 120 mg.  
 10 mg CHANGE how you take these medications Instructions Each Dose to Equal  
 Morning Noon Evening Bedtime * albuterol 90 mcg/actuation inhaler Commonly known as:  PROVENTIL HFA, VENTOLIN HFA, PROAIR HFA What changed:  Another medication with the same name was changed. Make sure you understand how and when to take each. Take 1 Puff by inhalation every six (6) hours as needed (Asthma/cough). Indications: Acute Asthma Attack 1 Puff * albuterol 2.5 mg /3 mL (0.083 %) nebulizer solution Commonly known as:  PROVENTIL VENTOLIN What changed:  reasons to take this 3 mL by Nebulization route every four (4) hours as needed for Wheezing or Shortness of Breath. Indications: Chronic Obstructive Pulmonary Disease 2.5 mg  
    
   
   
   
  
 metoprolol tartrate 25 mg tablet Commonly known as:  LOPRESSOR What changed:  how much to take Take 0.5 Tabs by mouth every twelve (12) hours. Indications: VENTRICULAR RATE CONTROL IN ATRIAL FIBRILLATION  
 12.5 mg  
    
  
   
   
  
   
  
 prednisoLONE acetate 1 % ophthalmic suspension Commonly known as:  PRED FORTE What changed:  additional instructions Administer 1 Drop to left eye two (2) times a day. Indications: PREVENTION OF CYTARABINE-INDUCED KERATOCONJUNCTIVITIS  
 1 Drop  
    
  
   
   
  
   
  
 risperiDONE 1 mg tablet Commonly known as:  RisperDAL What changed:  Another medication with the same name was removed. Continue taking this medication, and follow the directions you see here. Take 1 Tab by mouth nightly. 1 mg * Notice: This list has 2 medication(s) that are the same as other medications prescribed for you. Read the directions carefully, and ask your doctor or other care provider to review them with you. CONTINUE taking these medications Instructions Each Dose to Equal  
 Morning Noon Evening Bedtime  
 acetaminophen 325 mg tablet Commonly known as:  TYLENOL Take 2 Tabs by mouth every six (6) hours as needed for Pain or Fever. Indications: Arthritic Pain, Fever 650 mg  
    
   
   
   
  
 amiodarone 200 mg tablet Commonly known as:  CORDARONE Take 1 Tab by mouth daily. Indications: Cardioversion of Atrial Fibrillation  200 mg  
    
  
   
   
 apixaban 5 mg tablet Commonly known as:  Naomi Scriptyaya Take 1 Tab by mouth two (2) times a day. Indications: Cerebral Thromboembolism Prevention 5 mg  
    
  
   
   
  
   
  
 aspirin delayed-release 81 mg tablet Take 1 Tab by mouth daily. Indications: CAD  
 81 mg  
    
  
   
   
   
  
 cholecalciferol 1,000 unit Cap Commonly known as:  VITAMIN D3 Take 2 Caps by mouth daily. Daughter is unsure about the dosage. Indications: Osteoporosis, Vitamin D Deficiency 2000 Units  
    
  
   
   
   
  
 famotidine 20 mg tablet Commonly known as:  PEPCID Take 1 Tab by mouth nightly. Indications: gastroesophageal reflux disease 20 mg FLUoxetine 20 mg tablet Commonly known as:  PROzac Take 2 Tabs by mouth nightly. Indications: ANXIETY WITH DEPRESSION 40 mg  
    
   
   
  
   
  
 furosemide 20 mg tablet Commonly known as:  LASIX Take 1 Tab by mouth daily as needed. Indications: Edema 20 mg  
    
   
   
   
  
 insulin lispro 100 unit/mL injection Commonly known as:  HUMALOG SubCUTAneous route Before breakfast, lunch, dinner and at bedtime. For Blood Sugar (mg/dL) of:    Less than 150 =   0 units          150 -199 =   2 units 200 -249 =   4 units 250 -299 =   6 units 300 -349 =   8 units 350 and above =  10 units  Indications: type 2 diabetes mellitus  
     
  
   
  
   
  
   
  
  
 mometasone 220 mcg (120 doses) Aepb inhaler Commonly known as:  Almaz Dave Take 1 Puff by inhalation daily. Powder that patient inhales. 220mcg. Indications: MAINTENANCE THERAPY FOR ASTHMA  
 1 Puff  
    
  
   
   
   
  
 montelukast 10 mg tablet Commonly known as:  SINGULAIR Take 1 Tab by mouth daily. Indications: MAINTENANCE THERAPY FOR ASTHMA 10 mg  
    
  
   
   
   
  
 polyethylene glycol 17 gram/dose powder Commonly known as:  Frank Polanco Take 17 g by mouth daily as needed. Indications: constipation 17 g QUEtiapine 25 mg tablet Commonly known as:  SEROquel Take 0.5 Tabs by mouth two (2) times a day. Indications: Generalized Anxiety Disorder 12.5 mg  
    
  
   
   
  
   
  
 simvastatin 10 mg tablet Commonly known as:  ZOCOR Take 1 Tab by mouth nightly. Indications: hypertriglyceridemia 10 mg  
    
   
   
  
   
  
  
STOP taking these medications   
 glipiZIDE SR 10 mg CR tablet Commonly known as:  GLUCOTROL XL  
   
  
 losartan 25 mg tablet Commonly known as:  COZAAR Menthol-Zinc Oxide 0.44-20.6 % Oint Commonly known as:  CALMOSEPTINE  
   
  
 metFORMIN 1,000 mg tablet Commonly known as:  GLUCOPHAGE Where to Get Your Medications Information on where to get these meds will be given to you by the nurse or doctor. ! Ask your nurse or doctor about these medications  
  acetaminophen 325 mg tablet  
 albuterol 2.5 mg /3 mL (0.083 %) nebulizer solution  
 albuterol 90 mcg/actuation inhaler  
 amiodarone 200 mg tablet  
 aspirin delayed-release 81 mg tablet  
 atropine 1 % ophthalmic solution  
 cholecalciferol 1,000 unit Cap  
 doxycycline 100 mg capsule  
 famotidine 20 mg tablet FLUoxetine 20 mg tablet  
 furosemide 20 mg tablet  
 insulin aspart U-100 100 unit/mL injection  
 insulin glargine 100 unit/mL injection  
 insulin lispro 100 unit/mL injection LORazepam 2 mg/mL concentrated solution  
 metoprolol tartrate 25 mg tablet  
 montelukast 10 mg tablet  
 morphine 100 mg/5 mL (20 mg/mL) concentrated solution  
 polyethylene glycol 17 gram/dose powder  
 prednisoLONE acetate 1 % ophthalmic suspension QUEtiapine 25 mg tablet  
 risperiDONE 1 mg tablet  
 simvastatin 10 mg tablet

## 2018-07-26 NOTE — IP AVS SNAPSHOT
Candace Ryder 
 
 
 [address] Patient: Damir Garcia MRN: [id number] DRS:[id number] About your hospitalization You were admitted on:  July 26, 2018 You last received care in the:  [address] [place] [address] You were discharged on:  August 3, 2018 Why you were hospitalized Your primary diagnosis was:  Not on File Your diagnoses also included:  Hcap (Healthcare-Associated Pneumonia) Follow-up Information Follow up With Details Comments Contact Info MD Gabriela Whittaker [phone] [facility] [id number] [phone] Discharge Orders None A check den indicates which time of day the medication should be taken. My Medications START taking these medications Instructions Each Dose to Equal  
 Morning Noon Evening Bedtime  
 atropine 1 % ophthalmic solution 1 Drop by SubLINGual route every four (4) hours as needed. 1 Drop  
    
   
   
   
  
 doxycycline 100 mg capsule Commonly known as:  VIBRAMYCIN Take 1 Cap by mouth every twelve (12) hours for 2 days. 100 mg  
    
  
   
   
  
   
  
 insulin aspart U-100 100 unit/mL injection Commonly known as:  [place] Normal Insulin Sensitivity  For Blood Sugar (mg/dL) of:    Less than 150 =   0 units          150 -199 =   2 units 200 -249 =   4 units 250 -299 =   6 units 300 -349 =   8 units 350 and above =   10 units  
     
   
   
   
  
 insulin glargine 100 unit/mL injection Commonly known as:  LANTUS  
   
 18 Units by SubCUTAneous route Daily (before dinner). 18 Units LORazepam 2 mg/mL concentrated solution Commonly known as:  LORazepam Intensol Take 0.5 mL by mouth every four (4) hours as needed for Agitation, Anxiety or Other (shortness of breath). Max Daily Amount: 6 mg.  
 1 mg  
    
   
   
   
  
 morphine 100 mg/5 mL (20 mg/mL) concentrated solution Commonly known as:  Toribio Kicks Take 0.5 mL by mouth every two (2) hours as needed for Pain. Max Daily Amount: 120 mg.  
 10 mg CHANGE how you take these medications Instructions Each Dose to Equal  
 Morning Noon Evening Bedtime * albuterol 90 mcg/actuation inhaler Commonly known as:  PROVENTIL HFA, VENTOLIN HFA, PROAIR HFA What changed:  Another medication with the same name was changed. Make sure you understand how and when to take each. Take 1 Puff by inhalation every six (6) hours as needed (Asthma/cough). Indications: Acute Asthma Attack 1 Puff * albuterol 2.5 mg /3 mL (0.083 %) nebulizer solution Commonly known as:  PROVENTIL VENTOLIN What changed:  reasons to take this 3 mL by Nebulization route every four (4) hours as needed for Wheezing or Shortness of Breath. Indications: Chronic Obstructive Pulmonary Disease 2.5 mg  
    
   
   
   
  
 metoprolol tartrate 25 mg tablet Commonly known as:  LOPRESSOR What changed:  how much to take Take 0.5 Tabs by mouth every twelve (12) hours. Indications: VENTRICULAR RATE CONTROL IN ATRIAL FIBRILLATION  
 12.5 mg  
    
  
   
   
  
   
  
 prednisoLONE acetate 1 % ophthalmic suspension Commonly known as:  PRED FORTE What changed:  additional instructions Administer 1 Drop to left eye two (2) times a day. Indications: PREVENTION OF CYTARABINE-INDUCED KERATOCONJUNCTIVITIS  
 1 Drop  
    
  
   
   
  
   
  
 risperiDONE 1 mg tablet Commonly known as:  RisperDAL What changed:  Another medication with the same name was removed. Continue taking this medication, and follow the directions you see here. Take 1 Tab by mouth nightly. 1 mg * Notice: This list has 2 medication(s) that are the same as other medications prescribed for you.  Read the directions carefully, and ask your doctor or other care provider to review them with you. CONTINUE taking these medications Instructions Each Dose to Equal  
 Morning Noon Evening Bedtime  
 acetaminophen 325 mg tablet Commonly known as:  TYLENOL Take 2 Tabs by mouth every six (6) hours as needed for Pain or Fever. Indications: Arthritic Pain, Fever 650 mg  
    
   
   
   
  
 amiodarone 200 mg tablet Commonly known as:  CORDARONE Take 1 Tab by mouth daily. Indications: Cardioversion of Atrial Fibrillation 200 mg  
    
  
   
   
   
  
 apixaban 5 mg tablet Commonly known as:  Rosaland Rachid Take 1 Tab by mouth two (2) times a day. Indications: Cerebral Thromboembolism Prevention 5 mg  
    
  
   
   
  
   
  
 aspirin delayed-release 81 mg tablet Take 1 Tab by mouth daily. Indications: CAD  
 81 mg  
    
  
   
   
   
  
 cholecalciferol 1,000 unit Cap Commonly known as:  VITAMIN D3 Take 2 Caps by mouth daily. Daughter is unsure about the dosage. Indications: Osteoporosis, Vitamin D Deficiency 2000 Units  
    
  
   
   
   
  
 famotidine 20 mg tablet Commonly known as:  PEPCID Take 1 Tab by mouth nightly. Indications: gastroesophageal reflux disease 20 mg FLUoxetine 20 mg tablet Commonly known as:  PROzac Take 2 Tabs by mouth nightly. Indications: ANXIETY WITH DEPRESSION 40 mg  
    
   
   
  
   
  
 furosemide 20 mg tablet Commonly known as:  LASIX Take 1 Tab by mouth daily as needed. Indications: Edema 20 mg  
    
   
   
   
  
 insulin lispro 100 unit/mL injection Commonly known as:  HUMALOG SubCUTAneous route Before breakfast, lunch, dinner and at bedtime. For Blood Sugar (mg/dL) of:    Less than 150 =   0 units          150 -199 =   2 units 200 -249 =   4 units 250 -299 =   6 units 300 -349 =   8 units 350 and above =  10 units  Indications: type 2 diabetes mellitus mometasone 220 mcg (120 doses) Aepb inhaler Commonly known as:  Princella Farzaneh Take 1 Puff by inhalation daily. Powder that patient inhales. 220mcg. Indications: MAINTENANCE THERAPY FOR ASTHMA  
 1 Puff  
    
  
   
   
   
  
 montelukast 10 mg tablet Commonly known as:  SINGULAIR Take 1 Tab by mouth daily. Indications: MAINTENANCE THERAPY FOR ASTHMA 10 mg  
    
  
   
   
   
  
 polyethylene glycol 17 gram/dose powder Commonly known as:  Reda Tung Take 17 g by mouth daily as needed. Indications: constipation 17 g QUEtiapine 25 mg tablet Commonly known as:  SEROquel Take 0.5 Tabs by mouth two (2) times a day. Indications: Generalized Anxiety Disorder 12.5 mg  
    
  
   
   
  
   
  
 simvastatin 10 mg tablet Commonly known as:  ZOCOR Take 1 Tab by mouth nightly. Indications: hypertriglyceridemia 10 mg  
    
   
   
  
   
  
  
STOP taking these medications   
 glipiZIDE SR 10 mg CR tablet Commonly known as:  GLUCOTROL XL  
   
  
 losartan 25 mg tablet Commonly known as:  COZAAR Menthol-Zinc Oxide 0.44-20.6 % Oint Commonly known as:  CALMOSEPTINE  
   
  
 metFORMIN 1,000 mg tablet Commonly known as:  GLUCOPHAGE Where to Get Your Medications Information on where to get these meds will be given to you by the nurse or doctor. ! Ask your nurse or doctor about these medications  
  acetaminophen 325 mg tablet  
 albuterol 2.5 mg /3 mL (0.083 %) nebulizer solution  
 albuterol 90 mcg/actuation inhaler  
 amiodarone 200 mg tablet  
 aspirin delayed-release 81 mg tablet  
 atropine 1 % ophthalmic solution  
 cholecalciferol 1,000 unit Cap  
 doxycycline 100 mg capsule  
 famotidine 20 mg tablet FLUoxetine 20 mg tablet  
 furosemide 20 mg tablet  
 insulin aspart U-100 100 unit/mL injection  
 insulin glargine 100 unit/mL injection  
 insulin lispro 100 unit/mL injection LORazepam 2 mg/mL concentrated solution  
 metoprolol tartrate 25 mg tablet  
 montelukast 10 mg tablet  
 morphine 100 mg/5 mL (20 mg/mL) concentrated solution  
 polyethylene glycol 17 gram/dose powder  
 prednisoLONE acetate 1 % ophthalmic suspension QUEtiapine 25 mg tablet  
 risperiDONE 1 mg tablet  
 simvastatin 10 mg tablet Opioid Education Prescription Opioids: What You Need to Know: 
 
Prescription opioids can be used to help relieve moderate-to-severe pain and are often prescribed following a surgery or injury, or for certain health conditions. These medications can be an important part of treatment but also come with serious risks. Opioids are strong pain medicines. Examples include hydrocodone, oxycodone, fentanyl, and morphine. Heroin is an example of an illegal opioid. It is important to work with your health care provider to make sure you are getting the safest, most effective care. WHAT ARE THE RISKS AND SIDE EFFECTS OF OPIOID USE? Prescription opioids carry serious risks of addiction and overdose, especially with prolonged use. An opioid overdose, often marked by slow breathing, can cause sudden death. The use of prescription opioids can have a number of side effects as well, even when taken as directed. · Tolerance-meaning you might need to take more of a medication for the same pain relief · Physical dependence-meaning you have symptoms of withdrawal when the medication is stopped. Withdrawal symptoms can include nausea, sweating, chills, diarrhea, stomach cramps, and muscle aches. Withdrawal can last up to several weeks, depending on which drug you took and how long you took it. · Increased sensitivity to pain · Constipation · Nausea, vomiting, and dry mouth · Sleepiness and dizziness · Confusion · Depression · Low levels of testosterone that can result in lower sex drive, energy, and strength · Itching and sweating RISKS ARE GREATER WITH:      
 · History of drug misuse, substance use disorder, or overdose · Mental health conditions (such as depression or anxiety) · Sleep apnea · Older age (72 years or older) · Pregnancy Avoid alcohol while taking prescription opioids. Also, unless specifically advised by your health care provider, medications to avoid include: · Benzodiazepines (such as Xanax or Valium) · Muscle relaxants (such as Soma or Flexeril) · Hypnotics (such as Ambien or Lunesta) · Other prescription opioids KNOW YOUR OPTIONS Talk to your health care provider about ways to manage your pain that don't involve prescription opioids. Some of these options may actually work better and have fewer risks and side effects. Options may include: 
· Pain relievers such as acetaminophen, ibuprofen, and naproxen · Some medications that are also used for depression or seizures · Physical therapy and exercise · Counseling to help patients learn how to cope better with triggers of pain and stress. · Application of heat or cold compress · Massage therapy · Relaxation techniques Be Informed Make sure you know the name of your medication, how much and how often to take it, and its potential risks & side effects. IF YOU ARE PRESCRIBED OPIOIDS FOR PAIN: 
· Never take opioids in greater amounts or more often than prescribed. Remember the goal is not to be pain-free but to manage your pain at a tolerable level. · Follow up with your primary care provider to: · Work together to create a plan on how to manage your pain. · Talk about ways to help manage your pain that don't involve prescription opioids. · Talk about any and all concerns and side effects. · Help prevent misuse and abuse. · Never sell or share prescription opioids · Help prevent misuse and abuse. · Store prescription opioids in a secure place and out of reach of others (this may include visitors, children, friends, and family). · Safely dispose of unused/unwanted prescription opioids: Find your community drug take-back program or your pharmacy mail-back program, or flush them down the toilet, following guidance from the Food and Drug Administration (www.fda.gov/Drugs/ResourcesForYou). · Visit www.cdc.gov/drugoverdose to learn about the risks of opioid abuse and overdose. · If you believe you may be struggling with addiction, tell your health care provider and ask for guidance or call [address] [place] [address] at [phone]. Discharge Instructions None Adzilla Announcement We are excited to announce that we are making your provider's discharge notes available to you in Adzilla. You will see these notes when they are completed and signed by the physician that discharged you from your recent hospital stay. If you have any questions or concerns about any information you see in Adzilla, please call the Health Information Department where you were seen or reach out to your Primary Care Provider for more information about your plan of care. Introducing [facility]! Tristan Murillo introduces Adzilla patient portal. Now you can access parts of your medical record, email your doctor's office, and request medication refills online. 1. In your internet browser, go to [email or web address] 2. Click on the First Time User? Click Here link in the Sign In box. You will see the New Member Sign Up page. 3. Enter your Adzilla Access Code exactly as it appears below. You will not need to use this code after youve completed the sign-up process. If you do not sign up before the expiration date, you must request a new code. · Adzilla Access Code: [email or web address] Expires: 9/21/2018  4:29 AM 
 
4.  Enter the last four digits of your Social Security Number (xxxx) and Date of Birth (mm/dd/yyyy) as indicated and click Submit. You will be taken to the next sign-up page. 5. Create a Brandtonet ID. This will be your PixelFlow login ID and cannot be changed, so think of one that is secure and easy to remember. 6. Create a Brandtonet password. You can change your password at any time. 7. Enter your Password Reset Question and Answer. This can be used at a later time if you forget your password. 8. Enter your e-mail address. You will receive e-mail notification when new information is available in [address] 9. Click Sign Up. You can now view and download portions of your medical record. 10. Click the Download Summary menu link to download a portable copy of your medical information. If you have questions, please visit the Frequently Asked Questions section of the PixelFlow website. Remember, PixelFlow is NOT to be used for urgent needs. For medical emergencies, dial 911. Now available from your iPhone and Android! Introducing Junito Phillips As a [facility] patient, I wanted to make you aware of our electronic visit tool called Junito Bryanshannonfin. [facility] 24/7 allows you to connect within minutes with a medical provider 24 hours a day, seven days a week via a mobile device or tablet or logging into a secure website from your computer. You can access Junito Phillips from anywhere in the [place]. A virtual visit might be right for you when you have a simple condition and feel like you just dont want to get out of bed, or cant get away from work for an appointment, when your regular [facility] provider is not available (evenings, weekends or holidays), or when youre out of town and need minor care. Electronic visits cost only $49 and if the [facility] [employer] 24/7 provider determines a prescription is needed to treat your condition, one can be electronically transmitted to a nearby pharmacy*. Please take a moment to enroll today if you have not already done so. The enrollment process is free and takes just a few minutes. To enroll, please download the [employer] 24/7 maxime to your tablet or phone, or visit [email or web address] to enroll on your computer. And, as an [address] [place] [address] patient with a [employer] account, the results of your visits will be scanned into your electronic medical record and your primary care provider will be able to view the scanned results. We urge you to continue to see your regular [employer] provider for your ongoing medical care. And while your primary care provider may not be the one available when you seek a AGELON ? virtual visit, the peace of mind you get from getting a real diagnosis real time can be priceless. For more information on AGELON ?, view our Frequently Asked Questions (FAQs) at [email or web address]. Sincerely, 
 
Kilo Weston MD 
Chief Medical Officer [employer] *:  certain medications cannot be prescribed via AGELON ? Unresulted Labs-Please follow up with your PCP about these lab tests Order Current Status AFB CULTURE + SMEAR W/RFLX PCR AND ID Preliminary result CULTURE, BODY FLUID W GRAM STAIN Preliminary result Providers Seen During Your Hospitalization Provider Specialty Primary office phone Fe Valle MD Emergency Medicine [phone] Nuris Arguello MD Internal Medicine [phone] Crystal Vickers MD Internal Medicine [phone] Your Primary Care Physician (PCP) Primary Care Physician Office Phone Office Fax [facility], [facility] [phone] You are allergic to the following Allergen Reactions Compazine (Prochlorperazine Edisylate) Other (comments) Twitching, seizure-like symptoms Recent Documentation Height Weight Breastfeeding? BMI OB Status Smoking Status 1.651 m 93.4 kg No 34.28 kg/m2 Postmenopausal Never Smoker Emergency Contacts Name Discharge Info Relation Home Work Mobile Twan Ronquillo CAREGIVER [3] Daughter [21] [phone] MariellaAnna DISCHARGE CAREGIVER [3] Child [2] [phone] Azeb Gastelum  Daughter [21] [phone] Patient Belongings The following personal items are in your possession at time of discharge: 
  Dental Appliances: None  Visual Aid: Glasses      Home Medications: None   Jewelry: None  Clothing: None    Other Valuables: None Please provide this summary of care documentation to your next provider. Signatures-by signing, you are acknowledging that this After Visit Summary has been reviewed with you and you have received a copy. Patient Signature:  ____________________________________________________________ Date:  ____________________________________________________________  
  
Jessica Barraza Provider Signature:  ____________________________________________________________ Date:  ____________________________________________________________

## 2018-07-27 NOTE — PROGRESS NOTES
Problem: Dysphagia (Adult) Goal: *Acute Goals and Plan of Care (Insert Text) Dysphagia Present: yes Aspiration: suspected Recommendations: 
Diet: NPO while lethargic Meds: NPO Aspiration Precautions Oral Care TID Other: please complete nursing swallow screen when pt more awake/ alert Goals:  Patient will: 1. Tolerate PO trials with no overt s/s aspiration or distress in 4/5 trials 2. Utilize compensatory swallow strategies/maneuvers (decrease bite/sip, size/rate, alt. liq/sol) with min cues in 4/5 trials 3. Perform oral-motor/laryngeal strengthening exercises with min cues to increase oropharyngeal swallow function 4. Complete an objective swallow study (i.e., MBSS) to assess swallow integrity, r/o aspiration, and determine of safest LRD, min A Outcome: Not Met Speech LAnguage Pathology bedside swallow evaluation Patient: Natacha He (46 y.o. female) Date: 7/27/2018 Primary Diagnosis: HCAP (healthcare-associated pneumonia) Precautions: aspiration ASSESSMENT : 
Based on the objective data described below, the patient presents with mild- moderate oropharyngeal dysphagia in setting of AMS. Pt lethargic, kept eyes closed throughout session, not following commands, no verbalizations, however, pt did accept ice chip when placed to lips and 1/8 tsp thin when spoon placed on lower lip. Pt demo decreased labial seal, no anterior spillage, delayed oral onset, slow oral manipulation, suspect premature spillage into pharynx, mildly delayed swallow response (3-5 seconds), multiple swallows per presentations, no overt s/sx aspiration following ice chip, ?throat clear response vs moaning following thin via 1/8 tsp? Given pt's decreased level alertness, rec continue NPO status. Spoke with nursing re: completing nursing swallow screen when pt more awake/ alert. SLP will continue to follow. Thank you.  
 
Patient will benefit from skilled intervention to address the above impairments. Patients rehabilitation potential is considered to be Guarded Factors which may influence rehabilitation potential include:  
[]            None noted []            Mental ability/status [x]            Medical condition []            Home/family situation and support systems 
[]            Safety awareness 
[]            Pain tolerance/management [x]            Other: lethargy, decreased level alertness PLAN : 
Recommendations and Planned Interventions: 
 continue NPO status. Spoke with nursing re: completing nursing swallow screen when pt more awake/ alert. Frequency/Duration: Patient will be followed by speech-language pathology 1-2 times per day/3-5 days per week to address goals. Discharge Recommendations: To Be Determined SUBJECTIVE:  
Patient stated: n/a OBJECTIVE:  
 
Past Medical History:  
Diagnosis Date  Anemia NEC  Arthritis  Arthropathy, unspecified, site unspecified  Asthma  Atrial fibrillation ([address] CHRONIC A-FIB  Diabetes mellitus  Essential hypertension  Heart abnormalities   
 irregular heartbeat  Hypertension  Liver disease   
 steatohepatitis  Neurological disorder   
 dementia  Osteoarthritis LEFT HIP Past Surgical History:  
Procedure Laterality Date  HX BUNIONECTOMY  HX HIP REPLACEMENT  11/2014  HX OTHER SURGICAL  5/27/12  
 colon mass removed  HX TUBAL LIGATION Prior Level of Function/Home Situation: as per H&P Home Situation Home Environment: Assisted living Care Facility Name: [facility] One/Two Story Residence: One story Living Alone: No 
Support Systems: Assisted living Patient Expects to be Discharged to[de-identified] Assisted living Current DME Used/Available at Home: None Diet prior to admission: unknown Current Diet:  NPO Cognitive and Communication Status: 
Neurologic State: Lethargic, Eyes do not open to any stimulus Orientation Level: Unable to verbalize Cognition: No command following Safety/Judgement: Fall prevention Oral Assessment: 
Oral Assessment Labial: Decreased rate;Decreased seal 
Dentition: Other (comment) (unable to assess) Oral Hygiene:  (unable to assess) Lingual: Decreased rate;Decreased strength Velum: Unable to visualize Mandible: No impairment P.O. Trials: 
Patient Position: HOB 50* Vocal quality prior to P.O.: Aphonic Consistency Presented: Thin liquid; Ice chips How Presented: SLP-fed/presented;Spoon Bolus Acceptance: Impaired Bolus Formation/Control: Impaired Type of Impairment: Delayed;Premature spillage;Poor;Lip closure Propulsion: Delayed (# of seconds) Oral Residue: Other (comment) (unable to assess) Initiation of Swallow: Delayed (# of seconds) Laryngeal Elevation: Decreased Aspiration Signs/Symptoms: Clear throat (with thin via 1/8 tsp) Pharyngeal Phase Characteristics: Suspected pharyngeal residue;Poor endurance;Easily fatigued ; Double swallow Effective Modifications: None Cues for Modifications: Maximal 
  
 
Oral Phase Severity: Mild-moderate Pharyngeal Phase Severity : Mild-moderate GCODESwallowing:  Swallow Current Status CM= 80-99%  Swallow Goal Status CJ= 20-39%  Swallow D/C Status CJ= 20-39% The severity rating is based on the following outcomes: RACHAEL Noms Swallow Level 1 Clinical Judgement PAIN: 
Start of Eval: 0 End of Eval: 0 After treatment:  
[]            Patient left in no apparent distress sitting up in chair 
[x]            Patient left in no apparent distress in bed 
[x]            Call bell left within reach [x]            Nursing notified []            Family present 
[]            Caregiver present 
[]            Bed alarm activated COMMUNICATION/EDUCATION:  
[x]            Aspiration precautions; swallow safety; compensatory techniques. []            Patient/family have participated as able in goal setting and plan of care.  
[]            Patient/family agree to work toward stated goals and plan of care. []            Patient understands intent and goals of therapy; neutral about participation. [x]            Patient unable to participate in goal setting/plan of care; educ ongoing with interdisciplinary staff [x]         Posted safety precautions in patient's room. Thank you for this referral. 
Issa Shah MS, CCC/SLP Time Calculation: 14 mins

## 2018-07-27 NOTE — PROGRESS NOTES
Reason for Admission:   HCAP 
                
RRAT Score:   11 Plan for utilizing home health:       
                 
Likelihood of Readmission:  Green/Low Transition of Care Plan:  Return to [facility] Care Management Interventions PCP Verified by CM: Yes Speech Therapy Consult: Yes Current Support Network: Assisted Living Confirm Follow Up Transport: Family Plan discussed with Pt/Family/Caregiver: Yes Discharge Location Discharge Placement: Assisted Living Pt is alert and oriented in room. Pt asleep with daughter Janeen Kline at bedside. Pt's daughter informs that her sister Jessica Wright [phone]) is pt's POA. Pt is a resident of [facility]. Pt's daughter shares that she is dependent with [facility] and uses a wheelchair for mobility. Pt will need medical transportation at discharge.

## 2018-07-27 NOTE — PROGRESS NOTES
Pt woke up from sleeping all day at 1845. Pt was talking and said she was hungry. Bedside swallow test given with water-pt took first sip fine, second sip she did not tolerate. Pt failed test. PM nurse notified and she will try another bedside swallow test later with apple sauce. 
  
Bedside shift change report given to Kyle Richard (oncoming nurse) by Adrian Mccray RN (offgoing nurse).  Report included the following information SBAR, Kardex, MAR and Recent Results.

## 2018-07-27 NOTE — PROGRESS NOTES
Hospitalist Progress Note Patient: Gwen Banegas MRN: [id number]  CSN: [id number] YOB: 1940  Age: 66 y.o. Sex: female DOA: 7/26/2018 LOS:  LOS: 1 day A.fib rates improved. K 2.7 again this am.  
 
Family is aware she is not a candidate for valve repair. Daughter is to bring in copy of advanced directive. Patient is very somnolent today. ABG no evidence of CO2 retention. She wakes up however by the end of interview and states she feels better. Denies pain anywhere. Currently not safe for po intake while drowsy. Daughter states she had MRI done at [facility], which revealed mini stroke. Records not available through cc. Assessment/Plan 1. Acute metabolic encephalopathy. Appears to be related to infection. This is also superimposed on dementia. 2.  Healthcare-associated pneumonia. Please follow her blood cultures. Continue on broad-spectrum antibiotics for now. Pulmonary to consult. 3.  Preserved ejection fraction on recent echocardiogram.  Moderate to severe mitral regurgitation, wide-open tricuspid regurgitation. lasix held, Cardiology input appreciated. 4.  Chronic atrial fibrillation. on Eliquis and beta blocker. 5.  Anemia, normocytic, normochromic. 6.  Hypokalemia replete as needed. Replete magnesium. 7.  Hypomagnesemia replete as needed. 8.  Right greater than left pleural effusions. Pulmonary input appreciated. 9.  Atelectasis, ICS. 10.  Parkinson's, under the care of Dr. Jaimes. 11.  obesity. Body mass index is 34.93 kg/(m^2). 12.  Ambulatory dysfunction. She was walking a couple of months ago, but no longer. 13. excesive somnolence. Check NH3 and B12; consult neuro in am if not improved. No evidence of CO2 retention; head CT, UA ok 14. mild- moderate oropharyngeal dysphagia in setting of AMS. npo while lethargic; SLP following. 15. Deep venous thrombosis prophylaxis. She is on Eliquis. 16. FULL CODE. step-down. Daughter to bring in copy of advanced directive; will obtain copy to scan into cc. Daughter Murali Tellez [phone] Additional Notes:   
 
Case discussed with:  [x]Patient  [x]Family  [x]Nursing  []Case Management DVT Prophylaxis:  []Lovenox  []Hep SQ  []SCDs  []Coumadin   []On Heparin gtt   [x] eliquis Vital signs/Intake and Output: 
Visit Vitals  /77  Pulse 91  Temp 97.9 °F (36.6 °C)  Resp 20  
 Ht 5' 5\" (1.651 m)  Wt 95.2 kg (209 lb 14.4 oz)  SpO2 97%  Breastfeeding No  
 BMI 34.93 kg/m2 Current Shift:  07/27 0701 - 07/27 1900 In: 450 [I.V.:450] Out: 400 [Urine:400] Last three shifts:    
 
Drowsy, arousable, oriented x1. Ncat. perrl Irregularly irregular 
cta b.l anterior and infraaxillary fields. abd soft nt nd nabs No edema. dp 2+ b.l Moving all 4s. No rash Medications Reviewed Labs: Results:  
   
Chemistry Recent Labs  
   07/27/18 
 0529  07/26/18 
 1350 GLU  132*  189* NA  141  140  
K  2.7*  2.7*  
CL  107  104 CO2  23  24 BUN  8  9 CREA  1.16  0.89 CA  8.0*  8.3* AGAP  11  12 BUCR  7*  10* AP   --   80  
TP   --   6.8 ALB   --   2.9*  
GLOB   --   3.9 AGRAT   --   0.7* CBC w/Diff Recent Labs  
   07/27/18 
 0529  07/26/18 
 1350 WBC  5.3  8.2  
RBC  3.58*  3.68* HGB  10.4*  10.9* HCT  31.2*  32.6*  
PLT  262  279 GRANS  74*  84* LYMPH  18*  9*  
EOS  2  0 Cardiac Enzymes Recent Labs  
   07/26/18 
 1350 CPK  117 CKND1  1.7 Coagulation No results for input(s): PTP, INR, APTT in the last 72 hours. No lab exists for component: INREXT Lipid Panel Lab Results Component Value Date/Time Cholesterol, total 118 (L) 03/01/2017 09:54 AM  
 HDL Cholesterol 48 03/01/2017 09:54 AM  
 LDL, calculated 54 03/01/2017 09:54 AM  
 Triglyceride 78 03/01/2017 09:54 AM  
 CHOL/HDL Ratio 2.5 03/01/2017 09:54 AM  
  
BNP No results for input(s): BNPP in the last 72 hours.   
Liver Enzymes Recent Labs  
   07/26/18 
 1350 TP  6.8 ALB  2.9* AP  80 SGOT  21 Thyroid Studies Lab Results Component Value Date/Time TSH 2.00 07/26/2018 01:50 PM  
    
Procedures/imaging: see electronic medical records for all procedures/Xrays and details which were not copied into this note but were reviewed prior to creation of Plan.

## 2018-07-27 NOTE — PROGRESS NOTES
CMS went to speak with the pt in regards to the [employer] from Medicare About Your Rights. \"  Pt was unable to review and sign the documents provided.  No family were present at bedside.  Unsigned, but noted documents can be found in the chart for review

## 2018-07-27 NOTE — H&P
[address] [place] [address]   HISTORY AND PHYSICAL      Jade Hanson.  MR#: [id number]  : 1940  ACCOUNT #: [de-identified]   ADMIT DATE: 2018    CHIEF COMPLAINT:  Altered mental status. PRIMARY CARE PHYSICIAN:  Dr. Candice Jeff at [facility]. HISTORY OF PRESENT ILLNESS:  This is a 75-year-old -American female with a past medical history of Parkinson disease, recent admission for CHF at [facility], she has an EF of 51-55%, moderate to severe MR and wide-open TR with pulmonary hypertension. She also has chronic atrial fibrillation, maintained on Eliquis. Her cardiologist is in [place]. During her admission at [facility] she was diuresed. She had returned to [facility]. The daughter at the bedside provides most of the history and she states that the patient is not a candidate for rehab because she does not recall the exercises and what she was taught there. She was brought into the ED sent by [facility] for altered mental status. In the ED, she was noted to have a potassium of 2.7 and a magnesium of 1.2. She has bilateral pleural effusions, right greater than left, with likely bilateral infiltrates as well as atelectasis. Her daughter relates that they had appealed her discharge at [facility], but she ultimately had been discharged back to [facility]. She feels that the patient needed more time in the hospital.  There was report of fever from [facility], a scant cough. The patient described pain in her legs and cramping. There was no nausea or vomiting, but there was a report of decreased appetite. No report of foul odor to her urine. She did have some diarrhea during her recent hospital stay. At her baseline, she walks a little. She was walking just a couple of months ago. The daughter also states that at her baseline mental status, she knows her date of birth, but she does not know the day of the week or the year.   No further review of systems is obtainable related to the patient's altered mental status. In the ED, she received Zosyn as well as Tylenol, magnesium sulfate 4 grams IV x1, three runs of potassium. She also received a 1-liter bolus of normal saline as well as vancomycin. PAST MEDICAL HISTORY:  1.  Echo from 06/23/2018:  EF of 51-55%, moderate to severe MR, severe TR, wide open. Pulmonary hypertension. 2.  Atrial fibrillation, chronic, on Eliquis. 3.  Pleural effusions. 4.  Parkinson's. 5.  Morbid obesity. 6.  COPD. ALLERGIES:  COMPAZINE CAUSES TWITCHING AND SEIZURE-LIKE SYMPTOMS. HOME MEDICATIONS:  1. Tylenol 500 mg p.o. q.6 hours p.r.n.  2.  Albuterol 2 puffs inhaled q.6 hours p.r.n.  3.  Amiodarone 200 mg p.o. daily. 4.  Eliquis 5 mg p.o. b.i.d.  5.  Aspirin 81 mg p.o. daily. 6.  Vitamin D3, 2000 units p.o. daily. 7.  Pepcid 20 mg p.o. at bedtime. 8.  Prozac 40 mg p.o. at bedtime. 9.  Lasix 20 mg p.o. daily p.r.n. 10.  Glipizide SR 10 mg p.o. twice daily with meals. 11.  Insulin lispro sliding-scale insulin. 12.  Cozaar 25 mg p.o. daily. 13.  Metformin 1000 mg p.o. twice daily. 14.  Lopressor 25 mg p.o. q.12 hours. 15.  Asmanex Twisthaler 220 mcg inhaled daily. 16.  Singulair 10 mg p.o. daily. 17.  MiraLax 17 grams p.o. daily. 18.  Prednisolone 1% ophthalmic suspension 1 drop to left eye b.i.d.  19.  Seroquel 25 mg 1/2 tab p.o. twice daily. 20.  Risperdal 1 mg p.o. at bedtime. 21.  Zocor 10 mg p.o. at bedtime. PAST SURGICAL HISTORY:    1.  Bunionectomy:  2. Hip replacement. 3.  Colon mass removed. 4.  Tubal ligation. SOCIAL HISTORY:  She never smoked, never drank. She resides at [facility]. Her daughter resides locally and is involved in her care. FAMILY HISTORY:  Mother with hypertension. Father with diabetes. Daughter with diabetes.     PHYSICAL EXAMINATION:  VITAL SIGNS:  Temperature 99, pulse 122, blood pressure 122/66, respiratory rate 16, satting 96% on room air.  GENERAL:  She is awake, alert and oriented x2. She states she is in the \"hospital.\"  Obese, lying in bed, in no acute distress. Her daughter is at the bedside. HEENT:  Normocephalic, atraumatic. Pupils equally round and reactive to light. No scleral icterus. No conjunctival pallor. Oropharynx is clear. NECK:  Supple. Neck veins flat. No cervical lymphadenopathy, no carotid bruit. CARDIOVASCULAR:  S1, S2.  Regular rate and rhythm. LUNGS:  Decreased breath sounds to her bases. ABDOMEN:  Soft, nontender, nondistended, normoactive bowel sounds. EXTREMITIES:  No edema. Dorsalis pedis pulses 2+ bilaterally. NEUROLOGIC:  Cranial nerves II-XII are grossly intact. She does have a tremor to her left upper extremity. SKIN:  No rash, no lesion. LABORATORY DATA:  Urinalysis is unremarkable. WBC 8.2, hemoglobin 10.9, hematocrit 32.6, MCV 88.6, MCH 29.6, platelets 369, neutrophils 84%, no bands. Sodium 140, potassium 2.7, chloride 104, CO2 of 24, BUN 9, creatinine 0.89, glucose 189, calcium 8.3, magnesium 1.2, ALT 9, AST 21. Troponin 0.09. Blood cultures x2 are negative. IMAGING:  CT head, noncontrast:  No acute hemorrhage or midline shift. Mild diffuse atrophy. No evidence for fracture in the calvarium. Mild inflammatory changes, right sphenoid sinus. Chest x-ray:  Right greater than left pleural effusions. Probable underlying bilateral lower lobe infiltrates or atelectasis. Moderate cardiomegaly. DIAGNOSES:  1. Acute metabolic encephalopathy. Appears to be related to infection. This is also superimposed on dementia. 2.  Healthcare-associated pneumonia. Please follow her blood cultures. Continue on broad-spectrum antibiotics for now. Pulmonary to consult. 3.  Preserved ejection fraction on recent echocardiogram.  Moderate to severe mitral regurgitation, wide-open tricuspid regurgitation. Cardiology to consult.   We will hold her Lasix for now, as she appears volume depleted. 4.  Chronic atrial fibrillation. Continue her Eliquis and beta blocker. Again, cardiology to consult. 5.  Anemia, normocytic, normochromic. 6.  Hypokalemia with potassium of 2.7. Ongoing repletion. Check another basic metabolic panel in the morning. Replete magnesium. 7.  Hypomagnesemia with a level of 1.2. She was given intravenous in the emergency department. We will continue her on oral supplements. 8.  Right greater than left pleural effusions. Pulmonary to consult. 9.  Atelectasis, ICS. 10.  Parkinson's, under the care of Dr. Elise Johnson. 11.  Morbid obesity. 12.  Ambulatory dysfunction. She was walking a couple of months ago, but no longer. 13.  Deep venous thrombosis prophylaxis. She is on Eliquis. 14.  DISPOSITION:  SHE IS A FULL CODE. Admit to step-down. Time spent 70 minutes.       Patience Lily SOLER/CHARMAINE  D: 07/27/2018 00:31     T: 07/27/2018 03:50  JOB #: [id number]  CC: Maninder Rebollar MD

## 2018-07-27 NOTE — PROGRESS NOTES
Speech Pathology Note Swallow eval initiated b/s with recs of NPO due to level alertness with nursing swallow screen when pt able to maintain arousal.  Full report to follow. SLP will f/u. Thank you. Ena Braga, MS, CCC/SLP Speech- Language Pathologist

## 2018-07-27 NOTE — ED NOTES
TRANSFER - OUT REPORT:    Verbal report given to ODALYS rutledge(name) on Solo Celis  being transferred to CVT stepdown(unit) for routine progression of care       Report consisted of patients Situation, Background, Assessment and   Recommendations(SBAR). Information from the following report(s) SBAR was reviewed with the receiving nurse. Lines:   Peripheral IV 07/26/18 Left Hand (Active)   Site Assessment Clean, dry, & intact 7/26/2018  2:30 PM   Phlebitis Assessment 0 7/26/2018  2:30 PM   Infiltration Assessment 0 7/26/2018  2:30 PM   Dressing Status Clean, dry, & intact 7/26/2018  2:30 PM   Dressing Type Transparent 7/26/2018  2:30 PM   Hub Color/Line Status Blue;Patent; Flushed 7/26/2018  2:30 PM   Action Taken Blood drawn 7/26/2018  2:30 PM       Peripheral IV 07/26/18 Right Wrist (Active)   Site Assessment Clean, dry, & intact 7/26/2018  2:53 PM   Phlebitis Assessment 0 7/26/2018  2:53 PM   Infiltration Assessment 0 7/26/2018  2:53 PM   Dressing Status Clean, dry, & intact 7/26/2018  2:53 PM   Hub Color/Line Status Pink;Flushed;Patent 7/26/2018  2:53 PM        Opportunity for questions and clarification was provided. Patient transported with:   Monitor  Registered Nurse, transport.

## 2018-07-27 NOTE — PROGRESS NOTES
[facility] - Consult Note Date of  Admission: 7/26/2018  1:48 PM  
Primary Care Physician:  Aimee Villagran MD 
 
 Assessment:  
 
Patient Active Problem List  
Diagnosis Code  Atrial fibrillation with RVR ([facility]) I48.91  
 Cough R05  
 Hip arthritis M16.10  Edema R60.9  Shortness of breath R06.02  
 Pneumonia J18.9  Cellulitis of left lower extremity L03. [address]  Atrial fibrillation with RVR ([facility]) I48.91  
 Chronic anticoagulation Z79.01  
 Lower extremity edema R60.0  Psychiatric disorder F99  
 HCAP (healthcare-associated pneumonia) J18.9  
 
-AMS, CT head negative for acute CVA 
-HCAP, on abx 
-Likely dehydration -Hypokalemia and  Hypomagnesia, being repleted 
-Pleural effusions, R > L 
-Hx of chronic afib, rate controlled, on Eliquis for OAC 
-Diastolic CHF, compensatedNormal LV function by echo June 23, 2018 with EF 50-55%, moderate to severe MR and severe wide open TR.  
-Pulmonary HTN, 2/2 above 
-Parkinson's disease 
-Morbid obesity 
-Deconditioning, SNF resident, at baseline walks a little 
-COPD 
-Full Code 
 
-Follows with [employer] 
 
 Plan:  
 
-Appears dehydrated. Would hold diuretics. -BP labile. Continue BB and ARB. -Continue PO amiodarone. 
-Continue Eliquis. 
-No need for repeat echo.  
-Not a candidate for valve replacement d/t co-morbidities.  
-Continue antibiotics per primary team. 
-Continue work up for altered mental status. Somnolent during my evaluation this morning. History of Present Illness: This is a 66 y.o. female admitted for HCAP (healthcare-associated pneumonia). Patient complains of:  AMS Pt is a 68yo AA female with PMHx of chronic afib, mitral and tricuspid regurgitation, pulmonary hypertension, parkinson's, copd, and obesity. She is a SNF resident at [facility]. She had a recent admission at [facility]. She was admitted for diastolic CHF. She was diuresed and discharged back to SNF.  Daughter reports appealing her discharge as she felt patient needed more care but the appeal was denied. She was noted to have altered mental status at the SNF and sent to the ED. Daughter states at her baseline she is awake and alert and oriented to self and situation. She is not oriented to date. Patient was given fluids and started on IV abx. Admitted for further work up. Cardiac risk factors: CHF Age Review of Symptoms:  unable to assess d/t current medical condition Past Medical History:  
 
Past Medical History:  
Diagnosis Date  Anemia NEC  Arthritis  Arthropathy, unspecified, site unspecified  Asthma  Atrial fibrillation ([address] CHRONIC A-FIB  Diabetes mellitus  Essential hypertension  Heart abnormalities   
 irregular heartbeat  Hypertension  Liver disease   
 steatohepatitis  Neurological disorder   
 dementia  Osteoarthritis LEFT HIP Social History:  
 
Social History Social History  Marital status:  Spouse name: N/A  
 Number of children: N/A  
 Years of education: N/A Social History Main Topics  Smoking status: Never Smoker  Smokeless tobacco: Never Used  Alcohol use No  
 Drug use: No  
 Sexual activity: No  
 
Other Topics Concern  None Social History Narrative Family History:  
 
Family History Problem Relation Age of Onset  Hypertension Mother  Diabetes Father  Diabetes Daughter Medications: Allergies Allergen Reactions  Compazine [Prochlorperazine Edisylate] Other (comments) Twitching, seizure-like symptoms Current Facility-Administered Medications Medication Dose Route Frequency  potassium chloride 10 mEq, lidocaine (PF) (XYLOCAINE) 10 mg/mL (1 %) 1 mL in 0.9% sodium chloride 100 mL IVPB   IntraVENous Q1H  
 sodium chloride (NS) flush 5-10 mL  5-10 mL IntraVENous PRN  piperacillin-tazobactam (ZOSYN) 3.375 g in 0.9% sodium chloride (MBP/ADV) 100 mL ADV  3.375 g IntraVENous Q6H  
 vancomycin (VANCOCIN) 1,000 mg in 0.9% sodium chloride (MBP/ADV) 250 mL adv  1,000 mg IntraVENous Q12H  
 magnesium oxide (MAG-OX) tablet 400 mg  400 mg Oral DAILY  albuterol (PROVENTIL VENTOLIN) nebulizer solution 2.5 mg  2.5 mg Nebulization Q4H PRN  
 aspirin delayed-release tablet 81 mg  81 mg Oral DAILY  apixaban (ELIQUIS) tablet 5 mg  5 mg Oral BID  
 amiodarone (CORDARONE) tablet 200 mg  200 mg Oral DAILY  cholecalciferol (VITAMIN D3) tablet 2,000 Units  2,000 Units Oral DAILY  famotidine (PEPCID) tablet 20 mg  20 mg Oral QHS  FLUoxetine (PROzac) capsule 40 mg  40 mg Oral QHS  metoprolol tartrate (LOPRESSOR) tablet 25 mg  25 mg Oral Q12H  prednisoLONE acetate (PRED FORTE) 1 % ophthalmic suspension 1 Drop  1 Drop Left Eye BID  QUEtiapine (SEROquel) tablet 12.5 mg  12.5 mg Oral BID  risperiDONE (RisperDAL) tablet 1 mg  1 mg Oral QHS  simvastatin (ZOCOR) tablet 10 mg  10 mg Oral QHS  acetaminophen (TYLENOL) tablet 500 mg  500 mg Oral Q6H PRN  
 ondansetron (ZOFRAN) injection 4 mg  4 mg IntraVENous Q6H PRN Physical Exam:  
 
Visit Vitals  /77  Pulse 87  Temp 98.6 °F (37 °C)  Resp 18  Ht 5' 5\" (1.651 m)  Wt 95.2 kg (209 lb 14.4 oz)  SpO2 94%  Breastfeeding No  
 BMI 34.93 kg/m2 BP Readings from Last 3 Encounters:  
07/27/18 117/77  
06/26/18 115/76  
06/23/18 134/75 Pulse Readings from Last 3 Encounters:  
07/27/18 87  
06/26/18 94  
06/23/18 95 Wt Readings from Last 3 Encounters:  
07/27/18 95.2 kg (209 lb 14.4 oz) 06/26/18 102.1 kg (225 lb) 01/02/18 86.2 kg (190 lb) General: somnolent, very difficult to arouse, unable to follow commands Neck:  Supple, trachea midline, no jvd 
Lungs: unable to obtain posterior auscultation d/t current medical condition/AMS, anterolateral auscultation with diminished breath sounds without rales, wheezing, or crackles, normal effort Heart:  Reg rate and rhythm, no murmur Abdomen:  Obese, soft Extremities: no edema, atraumatic Skin: no rashes or open wounds to visible skin Neuro: unable to assess Psych: unable to assess Data Review:  
 
Recent Labs  
   07/27/18 
 0529  07/26/18 
 1350 WBC  5.3  8.2 HGB  10.4*  10.9* HCT  31.2*  32.6*  
PLT  262  279 Recent Labs  
   07/27/18 
 0529  07/26/18 
 1350 NA  141  140  
K  2.7*  2.7*  
CL  107  104 CO2  23  24 GLU  132*  189* BUN  8  9 CREA  1.16  0.89 CA  8.0*  8.3*  
MG  2.2  1.2*  
PHOS  3.6   --   
ALB   --   2.9*  
SGOT   --   21 ALT   --   9* Results for orders placed or performed during the hospital encounter of 07/26/18 EKG, 12 LEAD, INITIAL Result Value Ref Range Ventricular Rate 94 BPM  
 Atrial Rate 96 BPM  
 QRS Duration 90 ms Q-T Interval 312 ms QTC Calculation (Bezet) 390 ms Calculated R Axis 45 degrees Calculated T Axis 158 degrees Diagnosis Atrial fibrillation Low voltage QRS Nonspecific ST and T wave abnormality , probably digitalis effect Abnormal ECG No previous ECGs available All Cardiac Markers in the last 24 hours:   
Lab Results Component Value Date/Time  07/26/2018 01:50 PM  
 CKMB 2.0 07/26/2018 01:50 PM  
 CKND1 1.7 07/26/2018 01:50 PM  
 TROIQ 0.11 (H) 07/27/2018 05:29 AM  
 TROIQ 0.09 (H) 07/26/2018 01:50 PM  
 
 
Last Lipid:   
Lab Results Component Value Date/Time Cholesterol, total 118 (L) 03/01/2017 09:54 AM  
 HDL Cholesterol 48 03/01/2017 09:54 AM  
 LDL, calculated 54 03/01/2017 09:54 AM  
 Triglyceride 78 03/01/2017 09:54 AM  
 CHOL/HDL Ratio 2.5 03/01/2017 09:54 AM  
 
 
Signed By: KRISTIN Gracia   
 July 27, 2018

## 2018-07-27 NOTE — CONSULTS
[facility] Pulmonary, Critical Care, and Sleep Medicine Initial Patient Consult Name: Nikia Lynch MRN: [id number] : 1940 Hospital: [address] [place] [address]  Date: 2018 IMPRESSION:  
Acute metabolic encephalopathy- toxic/metabolic superimposed on dementia. Healthcare-associated pneumonia- started on broad-spectrum antibiotics for now. Chronic diastolic heart failure-Preserved ejection fraction on recent echocardiogram.  Moderate to severe mitral regurgitation, wide-open tricuspid regurgitation. Chronic atrial fibrillation. Right greater than left pleural effusions- chronic diastolic heart failure and poor cough with basal atelectasis Parkinson's, under the care of Dr. Alejo Rivera. Morbid obesity. Ambulatory dysfunction. She was walking a couple of months ago, but no longer. Deep venous thrombosis prophylaxis. She is on Eliquis. SHE IS A FULL CODE. RECOMMENDATIONS:  
· Oxygen- titrate to SaO2 goal > 90% · BIPAP if remains somnolent will check ABG · Bronchial hygiene protocol · Bronchodilators prn · Aspiration precautions · Appears dehydrated. Would hold diuretics. · Continue BB and ARB. PO amiodarone. · Continue Eliquis. · Continue antibiotics per primary team. 
· Continue work up for altered mental status. Somnolent · Assess home Oxygen needs at discharge · OT, PT, OOB and ambulate · Healthy weight · Will Follow · DVT, PUD prophylaxis Subjective: This patient has been seen and evaluated at the request of Dr. Alejo Malloy for HAP. History obtained from chart review- patient not conversant. Patient is a 66 y.o. female This is a 77-year-old -American female with a past medical history of Parkinson disease, recent admission for CHF at [facility], she has an EF of 51-55%, moderate to severe MR and wide-open TR with pulmonary hypertension. She also has chronic atrial fibrillation, maintained on Eliquis. Her cardiologist is in [place]. During her admission at [facility] she was diuresed. She had returned to [facility] The daughter at the bedside provides most of the history and she states that the patient is not a candidate for rehab because she does not recall the exercises and what she was taught there. She was brought into the ED sent by [id number] for altered mental status. In the ED, she was noted to have a potassium of 2.7 and a magnesium of 1.2. She has bilateral pleural effusions, right greater than left, with likely bilateral infiltrates as well as atelectasis. Her daughter relates that they had appealed her discharge at [facility], but she ultimately had been discharged back to [facility]. She feels that the patient needed more time in the hospital.  There was report of fever from [facility], a scant cough. The patient described pain in her legs and cramping. There was no nausea or vomiting, but there was a report of decreased appetite. No report of foul odor to her urine. She did have some diarrhea during her recent hospital stay. At her baseline, she walks a little. She was walking just a couple of months ago. The daughter also states that at her baseline mental status, she knows her date of birth, but she does not know the day of the week or the year. No further review of systems is obtainable related to the patient's altered mental status. In the ED, she received Zosyn as well as Tylenol, magnesium sulfate 4 grams IV x1, three runs of potassium. She also received a 1-liter bolus of normal saline as well as vancomycin. Past Medical History:  
Diagnosis Date  Anemia NEC  Arthritis  Arthropathy, unspecified, site unspecified  Asthma  Atrial fibrillation ([facility] [address] CHRONIC A-FIB  Diabetes mellitus  Essential hypertension  Heart abnormalities   
 irregular heartbeat  Hypertension  Liver disease   
 steatohepatitis  Neurological disorder   
 dementia  Osteoarthritis LEFT HIP Past Surgical History:  
Procedure Laterality Date  HX BUNIONECTOMY  HX HIP REPLACEMENT  11/2014  HX OTHER SURGICAL  5/27/12  
 colon mass removed  HX TUBAL LIGATION Prior to Admission medications Medication Sig Start Date End Date Taking? Authorizing Provider  
acetaminophen (TYLENOL) 325 mg tablet Take 2 Tabs by mouth every six (6) hours as needed for Pain or Fever. Indications: Arthritic Pain, Fever 6/26/18   Shakira Guerrero MD  
albuterol (PROVENTIL HFA, VENTOLIN HFA, PROAIR HFA) 90 mcg/actuation inhaler Take 1 Puff by inhalation every six (6) hours as needed (Asthma/cough). Indications: Acute Asthma Attack 6/26/18   Shakira Guerrero MD  
albuterol (PROVENTIL VENTOLIN) 2.5 mg /3 mL (0.083 %) nebulizer solution 3 mL by Nebulization route every four (4) hours as needed for Wheezing. Indications: Chronic Obstructive Pulmonary Disease 6/26/18   Shakira Guerrero MD  
amiodarone (CORDARONE) 200 mg tablet Take 1 Tab by mouth daily. Indications: Cardioversion of Atrial Fibrillation 6/26/18   Shakira Guerrero MD  
apixaban (ELIQUIS) 5 mg tablet Take 1 Tab by mouth two (2) times a day. Indications: Cerebral Thromboembolism Prevention 6/26/18   Shakira Guerrero MD  
aspirin delayed-release 81 mg tablet Take 1 Tab by mouth daily. Indications: CAD 6/26/18   Shakira Guerrero MD  
cholecalciferol (VITAMIN D3) 1,000 unit cap Take 2 Caps by mouth daily. Daughter is unsure about the dosage. Indications: Osteoporosis, Vitamin D Deficiency 6/26/18   Shakira Guerrero MD  
famotidine (PEPCID) 20 mg tablet Take 1 Tab by mouth nightly. Indications: gastroesophageal reflux disease 6/26/18   Shakira Guerrero MD  
FLUoxetine (PROZAC) 20 mg tablet Take 2 Tabs by mouth nightly.  Indications: ANXIETY WITH DEPRESSION 6/26/18   Karime Garner MD Savi  
furosemide (LASIX) 20 mg tablet Take 1 Tab by mouth daily as needed. Indications: Edema 6/26/18   Merry Ellison MD  
glipiZIDE SR (GLUCOTROL XL) 10 mg CR tablet Take 1 Tab by mouth two (2) times a day. 1 in the morning and 1 at bedtime. Indications: type 2 diabetes mellitus 6/26/18   Merry Ellison MD  
insulin lispro (HUMALOG) 100 unit/mL injection SubCUTAneous route Before breakfast, lunch, dinner and at bedtime. For Blood Sugar (mg/dL) of:    
Less than 150 =   0 units 150 -199 =   2 units 200 -249 =   4 units 250 -299 =   6 units 300 -349 =   8 units 350 and above =  10 units  Indications: type 2 diabetes mellitus 6/26/18   Merry Ellison MD  
losartan (COZAAR) 25 mg tablet Take 1 Tab by mouth daily. Indications: hypertension 6/26/18   Merry Ellison MD  
Menthol-Zinc Oxide (CALMOSEPTINE) 0.44-20.6 % oint Apply 1 g to affected area three (3) times daily as needed (For rash, apply to inner buttocks). Indications: Diaper Rash 6/26/18   Merry Ellison MD  
metFORMIN (GLUCOPHAGE) 1,000 mg tablet Take 1 Tab by mouth two (2) times daily (with meals). Indications: type 2 diabetes mellitus 6/26/18   Merry Ellison MD  
metoprolol tartrate (LOPRESSOR) 25 mg tablet Take 1 Tab by mouth every twelve (12) hours. Indications: VENTRICULAR RATE CONTROL IN ATRIAL FIBRILLATION 6/26/18   Merry Ellison MD  
mometasone [facility] TWISTHALER) 220 mcg (120 doses) aepb inhaler Take 1 Puff by inhalation daily. Powder that patient inhales. 220mcg. Indications: MAINTENANCE THERAPY FOR ASTHMA 6/26/18   Merry Ellison MD  
montelukast (SINGULAIR) 10 mg tablet Take 1 Tab by mouth daily. Indications: MAINTENANCE THERAPY FOR ASTHMA 6/26/18   Merry Ellison MD  
polyethylene glycol (MIRALAX) 17 gram/dose powder Take 17 g by mouth daily as needed.  Indications: constipation 6/26/18 Ishmael Gutierrez MD  
prednisoLONE acetate (PRED FORTE) 1 % ophthalmic suspension Administer 1 Drop to left eye two (2) times a day. Please verify with  Patient's daughter the percentage. Thanks,   Indications: PREVENTION OF CYTARABINE-INDUCED KERATOCONJUNCTIVITIS 6/26/18   Ishmael Gutierrez MD  
QUEtiapine (SEROQUEL) 25 mg tablet Take 0.5 Tabs by mouth two (2) times a day. Indications: Generalized Anxiety Disorder 6/26/18   Ishmael Gutierrez MD  
risperiDONE (RISPERDAL) 1 mg tablet Take 1 Tab by mouth nightly. Indications: BIPOLAR DISORDER IN REMISSION, DEPRESSION ASSOCIATED WITH BIPOLAR DISORDER 6/26/18   Ishmael Gutierrez MD  
simvastatin (ZOCOR) 10 mg tablet Take 1 Tab by mouth nightly. Indications: hypertriglyceridemia 6/26/18   Ishmael Gutierrez MD  
risperiDONE (RISPERDAL) 1 mg tablet Take 1 Tab by mouth nightly. 6/24/18   Greenwood MD Josselin  
 
Allergies Allergen Reactions  Compazine [Prochlorperazine Edisylate] Other (comments) Twitching, seizure-like symptoms Social History Substance Use Topics  Smoking status: Never Smoker  Smokeless tobacco: Never Used  Alcohol use No  
  
Family History Problem Relation Age of Onset  Hypertension Mother  Diabetes Father  Diabetes Daughter Current Facility-Administered Medications Medication Dose Route Frequency  potassium chloride 10 mEq, lidocaine (PF) (XYLOCAINE) 10 mg/mL (1 %) 1 mL in 0.9% sodium chloride 100 mL IVPB   IntraVENous Q1H  
 [START ON 7/28/2018] Vancomycin Trough Level Due 07/28/18  @ 0430  1 Each Other DAILY  piperacillin-tazobactam (ZOSYN) 3.375 g in 0.9% sodium chloride (MBP/ADV) 100 mL ADV  3.375 g IntraVENous Q6H  
 vancomycin (VANCOCIN) 1,000 mg in 0.9% sodium chloride (MBP/ADV) 250 mL adv  1,000 mg IntraVENous Q12H  
 magnesium oxide (MAG-OX) tablet 400 mg  400 mg Oral DAILY  aspirin delayed-release tablet 81 mg  81 mg Oral DAILY  apixaban (ELIQUIS) tablet 5 mg  5 mg Oral BID  
 amiodarone (CORDARONE) tablet 200 mg  200 mg Oral DAILY  cholecalciferol (VITAMIN D3) tablet 2,000 Units  2,000 Units Oral DAILY  famotidine (PEPCID) tablet 20 mg  20 mg Oral QHS  FLUoxetine (PROzac) capsule 40 mg  40 mg Oral QHS  metoprolol tartrate (LOPRESSOR) tablet 25 mg  25 mg Oral Q12H  prednisoLONE acetate (PRED FORTE) 1 % ophthalmic suspension 1 Drop  1 Drop Left Eye BID  QUEtiapine (SEROquel) tablet 12.5 mg  12.5 mg Oral BID  risperiDONE (RisperDAL) tablet 1 mg  1 mg Oral QHS  simvastatin (ZOCOR) tablet 10 mg  10 mg Oral QHS Review of Systems: 
Review of systems not obtained due to patient factors. Objective:  
Vital Signs:   
Visit Vitals  /77  Pulse 87  Temp 98.6 °F (37 °C)  Resp 18  Ht 5' 5\" (1.651 m)  Wt 95.2 kg (209 lb 14.4 oz)  SpO2 94%  Breastfeeding No  
 BMI 34.93 kg/m2 O2 Device: Room air Temp (24hrs), Av.2 °F (36.8 °C), Min:97.5 °F (36.4 °C), Max:99 °F (37.2 °C) Intake/Output:  
Last shift:        
Last 3 shifts:   
 
Intake/Output Summary (Last 24 hours) at 18 1112 Last data filed at 18 4867 Gross per 24 hour Intake                0 ml Output                0 ml Net                0 ml Physical Exam:  
General:  Appears stated age. Head:  Normocephalic, without obvious abnormality, atraumatic. Eyes:  Conjunctivae/corneas clear. PERRL, EOMs intact. Nose: Nares normal. Septum midline. Mucosa normal. No drainage or sinus tenderness. Throat: Lips, mucosa, and tongue normal. Teeth and gums normal.  
Neck: Supple, symmetrical, trachea midline, no adenopathy, thyroid: no enlargment/tenderness/nodules, no carotid bruit and no JVD. Back:   Symmetric, no curvature. ROM normal.  
Lungs:   Clear to auscultation bilaterally. Chest wall:  No tenderness or deformity.   
Heart:  Regular rate and rhythm, S1, S2 normal, no murmur, click, rub or gallop. Abdomen:   Soft, non-tender. Bowel sounds normal. No masses,  No organomegaly. Extremities: Extremities normal, atraumatic, no cyanosis or edema. Pulses: 2+ and symmetric all extremities. Skin: Skin color, texture, turgor normal. No rashes or lesions Lymph nodes: Cervical, supraclavicular, and axillary nodes normal.  
Neurologic: Grossly nonfocal  
 
Data review:  
 
Recent Results (from the past 24 hour(s)) METABOLIC PANEL, COMPREHENSIVE Collection Time: 07/26/18  1:50 PM  
Result Value Ref Range Sodium 140 136 - 145 mmol/L Potassium 2.7 (LL) 3.5 - 5.5 mmol/L Chloride 104 100 - 108 mmol/L  
 CO2 24 21 - 32 mmol/L Anion gap 12 3.0 - 18 mmol/L Glucose 189 (H) 74 - 99 mg/dL BUN 9 7.0 - 18 MG/DL Creatinine 0.89 0.6 - 1.3 MG/DL  
 BUN/Creatinine ratio 10 (L) 12 - 20 GFR est AA >60 >60 ml/min/1.73m2 GFR est non-AA >60 >60 ml/min/1.73m2 Calcium 8.3 (L) 8.5 - 10.1 MG/DL Bilirubin, total 1.0 0.2 - 1.0 MG/DL  
 ALT (SGPT) 9 (L) 13 - 56 U/L  
 AST (SGOT) 21 15 - 37 U/L Alk. phosphatase 80 45 - 117 U/L Protein, total 6.8 6.4 - 8.2 g/dL Albumin 2.9 (L) 3.4 - 5.0 g/dL Globulin 3.9 2.0 - 4.0 g/dL A-G Ratio 0.7 (L) 0.8 - 1.7    
CBC WITH AUTOMATED DIFF Collection Time: 07/26/18  1:50 PM  
Result Value Ref Range WBC 8.2 4.6 - 13.2 K/uL  
 RBC 3.68 (L) 4.20 - 5.30 M/uL  
 HGB 10.9 (L) 12.0 - 16.0 g/dL HCT 32.6 (L) 35.0 - 45.0 % MCV 88.6 74.0 - 97.0 FL  
 MCH 29.6 24.0 - 34.0 PG  
 MCHC 33.4 31.0 - 37.0 g/dL  
 RDW 14.1 11.6 - 14.5 % PLATELET 038 273 - 886 K/uL MPV 9.9 9.2 - 11.8 FL  
 NEUTROPHILS 84 (H) 42 - 75 % LYMPHOCYTES 9 (L) 20 - 51 % MONOCYTES 7 2 - 9 % EOSINOPHILS 0 0 - 5 % BASOPHILS 0 0 - 3 %  
 ABS. NEUTROPHILS 6.9 1.8 - 8.0 K/UL  
 ABS. LYMPHOCYTES 0.7 (L) 0.8 - 3.5 K/UL  
 ABS. MONOCYTES 0.6 0 - 1.0 K/UL  
 ABS. EOSINOPHILS 0.0 0.0 - 0.4 K/UL  
 ABS. BASOPHILS 0.0 0.0 - 0.06 K/UL  
 DF MANUAL  PLATELET COMMENTS ADEQUATE PLATELETS    
 RBC COMMENTS     
  ANISOCYTOSIS 
SLIGHT POIKILOCYTOSIS 
SLIGHT 
POLYCHROMASIA SLIGHT 
NORMOCYTIC, NORMOCHROMIC CARDIAC PANEL,(CK, CKMB & TROPONIN) Collection Time: 07/26/18  1:50 PM  
Result Value Ref Range  26 - 192 U/L  
 CK - MB 2.0 <3.6 ng/ml CK-MB Index 1.7 0.0 - 4.0 % Troponin-I, Qt. 0.09 (H) 0.0 - 0.045 NG/ML  
NT-PRO BNP Collection Time: 07/26/18  1:50 PM  
Result Value Ref Range NT pro-BNP 6620 (H) 0 - 1800 PG/ML  
TSH 3RD GENERATION Collection Time: 07/26/18  1:50 PM  
Result Value Ref Range TSH 2.00 0.36 - 3.74 uIU/mL MAGNESIUM Collection Time: 07/26/18  1:50 PM  
Result Value Ref Range Magnesium 1.2 (L) 1.6 - 2.6 mg/dL CULTURE, BLOOD Collection Time: 07/26/18  2:00 PM  
Result Value Ref Range Special Requests: NO SPECIAL REQUESTS Culture result: NO GROWTH AFTER 15 HOURS    
CULTURE, BLOOD Collection Time: 07/26/18  2:28 PM  
Result Value Ref Range Special Requests: NO SPECIAL REQUESTS Culture result: NO GROWTH AFTER 15 HOURS    
POC LACTIC ACID Collection Time: 07/26/18  2:30 PM  
Result Value Ref Range Lactic Acid (POC) 1.8 0.4 - 2.0 mmol/L  
URINALYSIS W/ RFLX MICROSCOPIC Collection Time: 07/26/18  4:00 PM  
Result Value Ref Range Color YELLOW Appearance CLEAR Specific gravity 1.016 1.005 - 1.030    
 pH (UA) 6.0 5.0 - 8.0 Protein TRACE (A) NEG mg/dL Glucose NEGATIVE  NEG mg/dL Ketone TRACE (A) NEG mg/dL Bilirubin NEGATIVE  NEG Blood NEGATIVE  NEG Urobilinogen 1.0 0.2 - 1.0 EU/dL Nitrites NEGATIVE  NEG Leukocyte Esterase NEGATIVE  NEG    
URINE MICROSCOPIC ONLY Collection Time: 07/26/18  4:00 PM  
Result Value Ref Range WBC NONE 0 - 4 /hpf  
 RBC NONE 0 - 5 /hpf Epithelial cells FEW 0 - 5 /lpf Bacteria FEW (A) NEG /hpf  
EKG, 12 LEAD, INITIAL Collection Time: 07/27/18  5:05 AM  
Result Value Ref Range  Ventricular Rate 94 BPM  
 Atrial Rate 96 BPM  
 QRS Duration 90 ms Q-T Interval 312 ms QTC Calculation (Bezet) 390 ms Calculated R Axis 45 degrees Calculated T Axis 158 degrees Diagnosis Atrial fibrillation Low voltage QRS Nonspecific ST and T wave abnormality , probably digitalis effect Abnormal ECG No previous ECGs available CBC WITH AUTOMATED DIFF Collection Time: 07/27/18  5:29 AM  
Result Value Ref Range WBC 5.3 4.6 - 13.2 K/uL  
 RBC 3.58 (L) 4.20 - 5.30 M/uL  
 HGB 10.4 (L) 12.0 - 16.0 g/dL HCT 31.2 (L) 35.0 - 45.0 % MCV 87.2 74.0 - 97.0 FL  
 MCH 29.1 24.0 - 34.0 PG  
 MCHC 33.3 31.0 - 37.0 g/dL  
 RDW 14.2 11.6 - 14.5 % PLATELET 468 604 - 796 K/uL MPV 9.9 9.2 - 11.8 FL  
 NEUTROPHILS 74 (H) 40 - 73 % LYMPHOCYTES 18 (L) 21 - 52 % MONOCYTES 6 3 - 10 % EOSINOPHILS 2 0 - 5 % BASOPHILS 0 0 - 2 %  
 ABS. NEUTROPHILS 4.0 1.8 - 8.0 K/UL  
 ABS. LYMPHOCYTES 1.0 0.9 - 3.6 K/UL  
 ABS. MONOCYTES 0.3 0.05 - 1.2 K/UL  
 ABS. EOSINOPHILS 0.1 0.0 - 0.4 K/UL  
 ABS. BASOPHILS 0.0 0.0 - 0.1 K/UL  
 DF AUTOMATED MAGNESIUM Collection Time: 07/27/18  5:29 AM  
Result Value Ref Range Magnesium 2.2 1.6 - 2.6 mg/dL METABOLIC PANEL, BASIC Collection Time: 07/27/18  5:29 AM  
Result Value Ref Range Sodium 141 136 - 145 mmol/L Potassium 2.7 (LL) 3.5 - 5.5 mmol/L Chloride 107 100 - 108 mmol/L  
 CO2 23 21 - 32 mmol/L Anion gap 11 3.0 - 18 mmol/L Glucose 132 (H) 74 - 99 mg/dL BUN 8 7.0 - 18 MG/DL Creatinine 1.16 0.6 - 1.3 MG/DL  
 BUN/Creatinine ratio 7 (L) 12 - 20 GFR est AA 55 (L) >60 ml/min/1.73m2 GFR est non-AA 45 (L) >60 ml/min/1.73m2 Calcium 8.0 (L) 8.5 - 10.1 MG/DL  
PHOSPHORUS Collection Time: 07/27/18  5:29 AM  
Result Value Ref Range Phosphorus 3.6 2.5 - 4.9 MG/DL  
TROPONIN I Collection Time: 07/27/18  5:29 AM  
Result Value Ref Range Troponin-I, Qt. 0.11 (H) 0.0 - 0.045 NG/ML  
GLUCOSE, POC  Collection Time: 07/27/18  7:38 AM  
Result Value Ref Range Glucose (POC) 136 (H) 70 - 110 mg/dL Imaging: 
I have personally reviewed the patients radiographs and have reviewed the reports: CXR Results  (Last 48 hours) 07/26/18 1423  XR CHEST PORT Final result Impression:  IMPRESSION:  
   
Right greater than left pleural effusions. Probable underlying bilateral lower lobe infiltrates or atelectasis. Moderate cardiomegaly Narrative:  EXAM: CHEST ONE VIEW portable 1410 hours CLINICAL HISTORY/INDICATION: meets SIRS criteria , acutely altered mental status x2 days, fever, tachycardia, tachypnea COMPARISON: None. TECHNIQUE: Single view obtained. FINDINGS:   
   
The cardiac silhouette is moderately enlarged. There is tortuosity of the aorta  
with calcified plaque. There is hazy opacity from the right superior hilar  
region to the lung base with obscuration of the right hemidiaphragm. There is  
increased opacity in the left retrocardiac region with minimal blunting of the  
left costophrenic angle. Pulmonary vascularity is normal.  
   
   
  
  
 
CT Results  (Last 48 hours) 07/26/18 1508  CT HEAD WO CONT Final result Impression:  IMPRESSION:  
   
No acute hemorrhage or midline shift. Mild diffuse atrophy. No evidence for fracture in the calvarium. Mild inflammatory changes right sphenoid sinus. Edgar Burton Narrative:  EXAM: CT head without contrast  
   
CLINICAL HISTORY/INDICATION: Altered mental status and fever. COMPARISON: None. TECHNIQUE: All CT scans at this facility are performed using dose optimization  
technique as appropriate to a performed exam, to include automated exposure  
control, adjustment of the mA and/or kV according to patient's size (including  
appropriate matching for site-specific examinations), or use of iterative  
reconstruction technique. Edgar Burton FINDINGS:  
   
CT scan is performed with multiple axial images from skull base to vertex. There  
is mild enlargement of the cerebral sulci, cisterns and ventricles compatible  
with diffuse atrophy. No acute hemorrhage or midline shift is noted. Bone window  
images shows no evidence for a fracture in the calvarium. Mild inflammatory  
changes are noted in the right sphenoid sinus. Ramo Martini PFT Results  (Last 48 hours) None Echo Results  (Last 48 hours) None High complexity decision making was performed during the evaluation of this patient at high risk for decompensation with multiple organ involvement 
  
Above mentioned total time spent on reviewing the case/medical record/data/notes/EMR/patient examination/documentation/coordinating care with nurse/consultants, exclusive of procedures with complex decision making performed and > 50% time spent in face to face evaluation.  
 
  
Naresh Malagon MD

## 2018-07-27 NOTE — ROUTINE PROCESS
2226-TRANSFER - IN REPORT:    Verbal report received from Trissity Rn(name) on Ellyn Millan  being received from ED(unit) for routine progression of care      Report consisted of patients Situation, Background, Assessment and   Recommendations(SBAR). Information from the following report(s) SBAR, Kardex, Intake/Output and Recent Results was reviewed with the receiving nurse. Opportunity for questions and clarification was provided. Assessment completed upon patients arrival to unit and care assumed. Patient in bed on monitor. Patient has an healing ulcer on buttocks , dark area on Lt. Heel. 0100- Patient sleeping at this time, no distress noted. 0630-Patient given bath, tolerated well.   0700-Bedside shift change report given to Daniela Sarkar (oncoming nurse) by Tesfaye Mcbride RN (offgoing nurse). Report included the following information SBAR, Kardex and MAR. Bed in lowest and wheels locked, Bed alarm on. Paged Dr. Darleen Cole in reference to K of 2.9, orders given for potassium with lidocaine x 4 runs.

## 2018-07-27 NOTE — DIABETES MGMT
Glycemic Control Plan of Care Diabetes mellitus with current A1c of 8.8% (6/23/2018). Noted patient came from [address] [place] [address]. Noted list of diabetes meds prior to admission: Glipizide SR 10 mg BID twice daily, Metfomin 1000 mg twice daily, and  humalog sliding scale insulin. No POC BG report for 7/26/2018. POC BG report at time of this review: 136 mg/dL. Patient lethargic. NPO status. Seen by SLP. Noted cardiology following and pending pulm consult. Recommendation(s): 
1.) POC BG monitoring. Assessment: 
Patient is a 66year old resident of [address] [place] [address] with medical history including diabetes mellitus,  Hypertension, high cholesterol, Atrial fibrillation, chronic on Eliquis, pleural effusions, morbid obesity, COPD, dementia, and Parkinson's disease - was admitted on 7/26/2018 with report of altered mental status. Noted: 
Acute metabolic encephalopathy. Healthcare-associated  Pneumonia. Right greater than left pleural effusions. Parkinson's Disease. Diabetes Mellitus. Most recent blood glucose values: 
 
No POC BG report for 7/26/2018. Results for Rajinder Perez (MRN [id number]) as of 7/27/2018 11:08 Ref. Range 7/27/2018 07:38 GLUCOSE,FAST - POC Latest Ref Range: 70 - 110 mg/dL 136 (H) Current A1C: 8.8% (6/23/2018) is equivalent to estimated average blood glucose of 206 mg/dL during the past 2-3 months. Current hospital diabetes medications: 
None. Total daily dose insulin requirement previous day: 7/26/2018 None. Home diabetes medications: Noted patient came from  [address]: 
Glipizide SR 10 mg twice daily. Metformin 1000 mg twice daily. Humalog sliding scale insulin. Diet: Currently NPO. Patient lethargic. Goals:  Blood glucose will be within target range of  mg/dL by 7/30/2018.  
 
Education:  ___  Refer to Diabetes Education Record 
           __X_  Education not indicated at this time: 7/27/2018: patient is currently lethargic. Noted that she was sent from [address] [facility] Oscar Rogers RN [facility] Pager: [phone]

## 2018-07-28 NOTE — PROGRESS NOTES
1920 bedside turnover given to me by RN Abimbola Lama and GALI Fairchild. Pt is on the cardiac telemetry monitor cvtel 3. She is awake and hollering hey you, I went in and she urinated on the bedpan, a purewick is in place and she was informed on how to use it. She is asking for food. 2035 swallow study, pt was yelling continuously that she had not had food and was hungry, I received in report that she had slept through dinner. Patient asked for water however I was told she coughed after a swallow of water. Patient did great with apple sauce, she ate it slowly and there was no coughing and no abnormalities. 2105 meds given 2230 meds given 0715 bedside turnover given to RN _ ZOHRA< MAR< ED summary and a chance to ask questions. Pt is on the cardiac telemetry monitor in stable condition in bed watching tv.

## 2018-07-28 NOTE — CONSULTS
[employer] [facility] Pulmonary, Critical Care, and Sleep Medicine Initial Patient Consult Name: Marline Wagner MRN: [id number] : 1940 Hospital: [facility] Date: 2018 IMPRESSION:  
Acute metabolic encephalopathy- toxic/metabolic superimposed on dementia. Healthcare-associated pneumonia- started on broad-spectrum antibiotics for now. Chronic diastolic heart failure-Preserved ejection fraction on recent echocardiogram.  Moderate to severe mitral regurgitation, wide-open tricuspid regurgitation. Chronic atrial fibrillation. Right greater than left pleural effusions- chronic diastolic heart failure and poor cough with basal atelectasis Parkinson's,- under the care of Dr. Shell Hilton. Morbid obesity. Ambulatory dysfunction. She was walking a couple of months ago, but no longer. Deep venous thrombosis prophylaxis. She is on Eliquis. SHE IS A FULL CODE. RECOMMENDATIONS:  
· Oxygen- titrate to SaO2 goal > 90% · CXR- AP/LAT in am 
· Bronchial hygiene protocol · Bronchodilators prn · Aspiration precautions · Diuresis per primary team 
· Continue Eliquis. -If procedure is anticipated would transition to heparin drip · Continue antibiotics per primary team. 
· Continue work up for altered mental status · Assess home Oxygen needs at discharge · OT, PT, OOB and ambulate · Other medical management per primary team and respective consultants Subjective: This patient has been seen and evaluated at the request of Dr. Tena Hansen for HAP. History obtained from chart review- patient not conversant. Patient is a 66 y.o. female This is a 75-year-old -American female with a past medical history of Parkinson disease, recent admission for CHF at [facility], she has an EF of 51-55%, moderate to severe MR and wide-open TR with pulmonary hypertension. She also has chronic atrial fibrillation, maintained on Eliquis. Her cardiologist is in [place]. During her admission at [place] she was diuresed. She had returned to [facility]. The daughter at the bedside provides most of the history and she states that the patient is not a candidate for rehab because she does not recall the exercises and what she was taught there. She was brought into the ED sent by [employer] for altered mental status. In the ED, she was noted to have a potassium of 2.7 and a magnesium of 1.2. She has bilateral pleural effusions, right greater than left, with likely bilateral infiltrates as well as atelectasis. Her daughter relates that they had appealed her discharge at [facility], but she ultimately had been discharged back to [facility]. She feels that the patient needed more time in the hospital.  There was report of fever from [employer], a scant cough. The patient described pain in her legs and cramping. There was no nausea or vomiting, but there was a report of decreased appetite. No report of foul odor to her urine. She did have some diarrhea during her recent hospital stay. At her baseline, she walks a little. She was walking just a couple of months ago. The daughter also states that at her baseline mental status, she knows her date of birth, but she does not know the day of the week or the year. No further review of systems is obtainable related to the patient's altered mental status. In the ED, she received Zosyn as well as Tylenol, magnesium sulfate 4 grams IV x1, three runs of potassium. She also received a 1-liter bolus of normal saline as well as vancomycin. 
 
07/28/18 
- patient sitting up in bed - On oxygen at 2LPM 
- Affect flat but answering a few questions, requested my card and held out her hand to take it - Daughter at bedside - Ate small portion of food today Past Medical History:  
Diagnosis Date  Anemia NEC  Arthritis  Arthropathy, unspecified, site unspecified  Asthma  Atrial fibrillation ([facility] [address] CHRONIC A-FIB  Diabetes mellitus  Essential hypertension  Heart abnormalities   
 irregular heartbeat  Hypertension  Liver disease   
 steatohepatitis  Neurological disorder   
 dementia  Osteoarthritis LEFT HIP Past Surgical History:  
Procedure Laterality Date  HX BUNIONECTOMY  HX HIP REPLACEMENT  11/2014  HX OTHER SURGICAL  5/27/12  
 colon mass removed  HX TUBAL LIGATION Prior to Admission medications Medication Sig Start Date End Date Taking? Authorizing Provider  
acetaminophen (TYLENOL) 325 mg tablet Take 2 Tabs by mouth every six (6) hours as needed for Pain or Fever. Indications: Arthritic Pain, Fever 6/26/18   Kathryn Blackman MD  
albuterol (PROVENTIL HFA, VENTOLIN HFA, PROAIR HFA) 90 mcg/actuation inhaler Take 1 Puff by inhalation every six (6) hours as needed (Asthma/cough). Indications: Acute Asthma Attack 6/26/18   Kathryn Blackman MD  
albuterol (PROVENTIL VENTOLIN) 2.5 mg /3 mL (0.083 %) nebulizer solution 3 mL by Nebulization route every four (4) hours as needed for Wheezing. Indications: Chronic Obstructive Pulmonary Disease 6/26/18   Kathryn Blackman MD  
amiodarone (CORDARONE) 200 mg tablet Take 1 Tab by mouth daily. Indications: Cardioversion of Atrial Fibrillation 6/26/18   Kathryn Blackman MD  
apixaban (ELIQUIS) 5 mg tablet Take 1 Tab by mouth two (2) times a day. Indications: Cerebral Thromboembolism Prevention 6/26/18   Kathryn Blackman MD  
aspirin delayed-release 81 mg tablet Take 1 Tab by mouth daily. Indications: CAD 6/26/18   Kathryn Blackman MD  
cholecalciferol (VITAMIN D3) 1,000 unit cap Take 2 Caps by mouth daily. Daughter is unsure about the dosage. Indications: Osteoporosis, Vitamin D Deficiency 6/26/18   Kathryn Blackman MD  
famotidine (PEPCID) 20 mg tablet Take 1 Tab by mouth nightly. Indications: gastroesophageal reflux disease 6/26/18   Monster Lobato MD  
FLUoxetine (PROZAC) 20 mg tablet Take 2 Tabs by mouth nightly. Indications: ANXIETY WITH DEPRESSION 6/26/18   Monster Lobato MD  
furosemide (LASIX) 20 mg tablet Take 1 Tab by mouth daily as needed. Indications: Edema 6/26/18   Monster Lobato MD  
glipiZIDE SR (GLUCOTROL XL) 10 mg CR tablet Take 1 Tab by mouth two (2) times a day. 1 in the morning and 1 at bedtime. Indications: type 2 diabetes mellitus 6/26/18   Monster Lobato MD  
insulin lispro (HUMALOG) 100 unit/mL injection SubCUTAneous route Before breakfast, lunch, dinner and at bedtime. For Blood Sugar (mg/dL) of:    
Less than 150 =   0 units 150 -199 =   2 units 200 -249 =   4 units 250 -299 =   6 units 300 -349 =   8 units 350 and above =  10 units  Indications: type 2 diabetes mellitus 6/26/18   Monster Lobato MD  
losartan (COZAAR) 25 mg tablet Take 1 Tab by mouth daily. Indications: hypertension 6/26/18   Monster Lobato MD  
Menthol-Zinc Oxide (CALMOSEPTINE) 0.44-20.6 % oint Apply 1 g to affected area three (3) times daily as needed (For rash, apply to inner buttocks). Indications: Diaper Rash 6/26/18   Monster Lobato MD  
metFORMIN (GLUCOPHAGE) 1,000 mg tablet Take 1 Tab by mouth two (2) times daily (with meals). Indications: type 2 diabetes mellitus 6/26/18   Monster Lobato MD  
metoprolol tartrate (LOPRESSOR) 25 mg tablet Take 1 Tab by mouth every twelve (12) hours. Indications: VENTRICULAR RATE CONTROL IN ATRIAL FIBRILLATION 6/26/18   Monster Lobato MD  
mometasone [facility]) 220 mcg (120 doses) aepb inhaler Take 1 Puff by inhalation daily. Powder that patient inhales. 220mcg.   Indications: MAINTENANCE THERAPY FOR ASTHMA 6/26/18   Monster Lobato MD  
montelukast (SINGULAIR) 10 mg tablet Take 1 Tab by mouth daily. Indications: MAINTENANCE THERAPY FOR ASTHMA 6/26/18   Kennedi Mai MD  
polyethylene glycol (MIRALAX) 17 gram/dose powder Take 17 g by mouth daily as needed. Indications: constipation 6/26/18   Kennedi Mai MD  
prednisoLONE acetate (PRED FORTE) 1 % ophthalmic suspension Administer 1 Drop to left eye two (2) times a day. Please verify with  Patient's daughter the percentage. Thanks,   Indications: PREVENTION OF CYTARABINE-INDUCED KERATOCONJUNCTIVITIS 6/26/18   Kennedi Mai MD  
QUEtiapine (SEROQUEL) 25 mg tablet Take 0.5 Tabs by mouth two (2) times a day. Indications: Generalized Anxiety Disorder 6/26/18   Kennedi Mai MD  
risperiDONE (RISPERDAL) 1 mg tablet Take 1 Tab by mouth nightly. Indications: BIPOLAR DISORDER IN REMISSION, DEPRESSION ASSOCIATED WITH BIPOLAR DISORDER 6/26/18   Kennedi Mai MD  
simvastatin (ZOCOR) 10 mg tablet Take 1 Tab by mouth nightly. Indications: hypertriglyceridemia 6/26/18   Kennedi Mai MD  
risperiDONE (RISPERDAL) 1 mg tablet Take 1 Tab by mouth nightly. 6/24/18   Eugenie Aguirre MD  
 
Allergies Allergen Reactions  Compazine [Prochlorperazine Edisylate] Other (comments) Twitching, seizure-like symptoms Social History Substance Use Topics  Smoking status: Never Smoker  Smokeless tobacco: Never Used  Alcohol use No  
  
Family History Problem Relation Age of Onset  Hypertension Mother  Diabetes Father  Diabetes Daughter Current Facility-Administered Medications Medication Dose Route Frequency  VANCOMYCIN INFORMATION NOTE   Other CONTINUOUS  
 lactulose (CHRONULAC) solution 20 g  20 g Oral BID  insulin lispro (HUMALOG) injection   SubCUTAneous AC&HS  FLUoxetine (PROzac) capsule 20 mg  20 mg Oral DAILY  QUEtiapine (SEROquel) tablet 12.5 mg  12.5 mg Oral QHS  risperiDONE (RisperDAL) tablet 0.5 mg  0.5 mg Oral QHS  piperacillin-tazobactam (ZOSYN) 3.375 g in 0.9% sodium chloride (MBP/ADV) 100 mL ADV  3.375 g IntraVENous Q6H  
 magnesium oxide (MAG-OX) tablet 400 mg  400 mg Oral DAILY  aspirin delayed-release tablet 81 mg  81 mg Oral DAILY  apixaban (ELIQUIS) tablet 5 mg  5 mg Oral BID  
 amiodarone (CORDARONE) tablet 200 mg  200 mg Oral DAILY  cholecalciferol (VITAMIN D3) tablet 2,000 Units  2,000 Units Oral DAILY  famotidine (PEPCID) tablet 20 mg  20 mg Oral QHS  metoprolol tartrate (LOPRESSOR) tablet 25 mg  25 mg Oral Q12H  prednisoLONE acetate (PRED FORTE) 1 % ophthalmic suspension 1 Drop  1 Drop Left Eye BID  simvastatin (ZOCOR) tablet 10 mg  10 mg Oral QHS Review of Systems: 
Review of systems not obtained due to patient factors. Objective:  
Vital Signs:   
Visit Vitals  /65  Pulse 67  Temp 97.4 °F (36.3 °C)  Resp 18  Ht 5' 5\" (1.651 m)  Wt 102.2 kg (225 lb 4.8 oz)  SpO2 96%  Breastfeeding No  
 BMI 37.49 kg/m2 O2 Device: Nasal cannula O2 Flow Rate (L/min): 2 l/min Temp (24hrs), Av.8 °F (36.6 °C), Min:97.4 °F (36.3 °C), Max:98.2 °F (36.8 °C) Intake/Output:  
Last shift:      701 - 1900 In: -  
Out: 619 [[facility]:802] Last 3 shifts: 1901 -  07 In: 450 [I.V.:450] Out: 500 [Urine:500] Intake/Output Summary (Last 24 hours) at 18 1436 Last data filed at 18 0800 Gross per 24 hour Intake                0 ml Output              400 ml Net             -400 ml Physical Exam:  
General:  Appears stated age. Head:  Normocephalic, without obvious abnormality, atraumatic. Eyes:  Conjunctivae/corneas clear. PERRL, EOMs intact. Nose: Nares normal. Septum midline. Mucosa normal. No drainage or sinus tenderness. Throat: Lips, mucosa, and tongue normal.- No exudates Neck: Supple, symmetrical, trachea midline, no adenopathy, thyroid: no enlargment/tenderness/nodules, no carotid bruit and no JVD. Back:   Symmetric, no curvature. ROM normal.  
Lungs:   Clear to auscultation bilaterally. Chest wall:  No tenderness or deformity. Heart:  Regular rate and rhythm, S1, S2 normal, + murmur, No click, rub or gallop. Abdomen:   Soft, non-tender. Bowel sounds normal. No masses,  No organomegaly. Extremities: Extremities normal, atraumatic, no cyanosis or edema. Pulses: 2+ and symmetric all extremities. Skin: Skin color, texture, turgor normal. No rashes or lesions Lymph nodes: Cervical, supraclavicular nodes, non-palpable Neurologic: Grossly nonfocal  
 
Data review:  
 
Results for Lina Adler (MRN [id number]) as of 7/28/2018 14:34 Ref. Range 6/25/2018 02:46 6/26/2018 03:18 7/26/2018 13:50 7/27/2018 05:29 7/28/2018 06:37 WBC Latest Ref Range: 4.6 - 13.2 K/uL 6.6 5.9 8.2 5.3 6.1 Recent Results (from the past 24 hour(s)) POC G3 Collection Time: 07/27/18  2:51 PM  
Result Value Ref Range Device: ROOM AIR    
 FIO2 (POC) 21 % pH (POC) 7.426 7.35 - 7.45    
 pCO2 (POC) 31.4 (L) 35.0 - 45.0 MMHG  
 pO2 (POC) 77 (L) 80 - 100 MMHG  
 HCO3 (POC) 20.7 (L) 22 - 26 MMOL/L  
 sO2 (POC) 96 92 - 97 % Base deficit (POC) 4 mmol/L Allens test (POC) YES Site RIGHT RADIAL Patient temp. 36.8 Specimen type (POC) ARTERIAL Performed by Rebecca Estrada GLUCOSE, POC Collection Time: 07/27/18  3:55 PM  
Result Value Ref Range Glucose (POC) 127 (H) 70 - 110 mg/dL VITAMIN B12 & FOLATE Collection Time: 07/28/18  4:30 AM  
Result Value Ref Range Vitamin B12 684 211 - 911 pg/mL Folate >20.0 (H) 3.10 - 17.50 ng/mL MAGNESIUM Collection Time: 07/28/18  4:30 AM  
Result Value Ref Range Magnesium 2.0 1.6 - 2.6 mg/dL METABOLIC PANEL, BASIC Collection Time: 07/28/18  4:30 AM  
Result Value Ref Range Sodium 142 136 - 145 mmol/L Potassium 4.1 3.5 - 5.5 mmol/L Chloride 111 (H) 100 - 108 mmol/L  
 CO2 18 (L) 21 - 32 mmol/L  Anion gap 13 3.0 - 18 mmol/L Glucose 172 (H) 74 - 99 mg/dL BUN 12 7.0 - 18 MG/DL Creatinine 1.79 (H) 0.6 - 1.3 MG/DL  
 BUN/Creatinine ratio 7 (L) 12 - 20 GFR est AA 33 (L) >60 ml/min/1.73m2 GFR est non-AA 27 (L) >60 ml/min/1.73m2 Calcium 7.6 (L) 8.5 - 10.1 MG/DL Johnston Bias Collection Time: 07/28/18  6:37 AM  
Result Value Ref Range Vancomycin,trough 25.1 (HH) 10.0 - 20.0 ug/mL Reported dose date: [id number] Reported dose time: 1700 Reported dose: 1000MG UNITS  
AMMONIA Collection Time: 07/28/18  6:37 AM  
Result Value Ref Range Ammonia 59 (H) 11 - 32 UMOL/L  
CBC WITH AUTOMATED DIFF Collection Time: 07/28/18  6:37 AM  
Result Value Ref Range WBC 6.1 4.6 - 13.2 K/uL  
 RBC 3.72 (L) 4.20 - 5.30 M/uL  
 HGB 10.7 (L) 12.0 - 16.0 g/dL HCT 32.7 (L) 35.0 - 45.0 % MCV 87.9 74.0 - 97.0 FL  
 MCH 28.8 24.0 - 34.0 PG  
 MCHC 32.7 31.0 - 37.0 g/dL  
 RDW 14.4 11.6 - 14.5 % PLATELET 909 996 - 087 K/uL MPV 10.0 9.2 - 11.8 FL  
 NEUTROPHILS 75 (H) 40 - 73 % LYMPHOCYTES 14 (L) 21 - 52 % MONOCYTES 9 3 - 10 % EOSINOPHILS 2 0 - 5 % BASOPHILS 0 0 - 2 %  
 ABS. NEUTROPHILS 4.7 1.8 - 8.0 K/UL  
 ABS. LYMPHOCYTES 0.8 (L) 0.9 - 3.6 K/UL  
 ABS. MONOCYTES 0.5 0.05 - 1.2 K/UL  
 ABS. EOSINOPHILS 0.1 0.0 - 0.4 K/UL  
 ABS. BASOPHILS 0.0 0.0 - 0.1 K/UL  
 DF AUTOMATED    
GLUCOSE, POC Collection Time: 07/28/18  7:29 AM  
Result Value Ref Range Glucose (POC) 228 (H) 70 - 110 mg/dL GLUCOSE, POC Collection Time: 07/28/18 10:50 AM  
Result Value Ref Range Glucose (POC) 231 (H) 70 - 110 mg/dL Imaging: 
I have personally reviewed the patients radiographs and have reviewed the reports: CXR Results  (Last 48 hours) None CT Results  (Last 48 hours) 07/26/18 1508  CT HEAD WO CONT Final result Impression:  IMPRESSION:  
   
No acute hemorrhage or midline shift. Mild diffuse atrophy. No evidence for fracture in the calvarium.   
Mild inflammatory changes right sphenoid sinus. Collin [place] Narrative:  EXAM: CT head without contrast  
   
CLINICAL HISTORY/INDICATION: Altered mental status and fever. COMPARISON: None. TECHNIQUE: All CT scans at this facility are performed using dose optimization  
technique as appropriate to a performed exam, to include automated exposure  
control, adjustment of the mA and/or kV according to patient's size (including  
appropriate matching for site-specific examinations), or use of iterative  
reconstruction technique. Collin Ottawa FINDINGS:  
   
CT scan is performed with multiple axial images from skull base to vertex. There  
is mild enlargement of the cerebral sulci, cisterns and ventricles compatible  
with diffuse atrophy. No acute hemorrhage or midline shift is noted. Bone window  
images shows no evidence for a fracture in the calvarium. Mild inflammatory  
changes are noted in the right sphenoid sinus. Collin Ottawa PFT Results  (Last 48 hours) None Echo Results  (Last 48 hours) None High complexity decision making was performed during the evaluation of this patient at high risk for decompensation with multiple organ involvement 
  
Above mentioned total time spent on reviewing the case/medical record/data/notes/EMR/patient examination/documentation/coordinating care with nurse/consultants, exclusive of procedures with complex decision making performed and > 50% time spent in face to face evaluation. Nava Morgan DO, [facility], Critical Care Medicine Clinical care, chart review and time spent coordinating care: 25 min

## 2018-07-28 NOTE — PROGRESS NOTES
0730 - vd report on patient, introduced self as RN 
 
0800 - Patient resting in bed with eyes closed, arouses to voice. AAOx2, oriented to person and place, disoriented to time and situation. Respirations even and unlabored. Dyspnea on exertion. Just [facility] breathing treatment after reports of SOB. Now resting in bed with even chest rise. Currently on 2L nasal cannula. Cardiac monitoring in place, currently atrial fibrillation, controlled in the 60's. Skin is warm and dry. mepilex to left heel and sacrum, CDI. Order placed for pureed diet, informed dietary, will bring tray. Patient repositioned in bed, airboots replaced. Purewick in place, patient is clean and dry. Denies any acute needs, NAD, will continue to monitor. 0900 - Patient resting in bed with eyes open, watching tv in bed. IV antibiotics infusing. Tolerated medications well. Ate breakfast independently, pureed diet with nectar thick until re-eval completed by speech. Patient tolerated well, no signs of aspiration. Ate 75% of tray without difficulty. Tolerated medications in applesauce. Cardiac monitoring remains in place, currently afib 55-65. Held metoprolol this morning. NAD, will continue to monitor 1015 - Patient soiled bedding, complete linen change, gown change, replaced purewick. Suzanne-care provided, bathed with bath wipes. Replaced mepilex to sacrum and left heel. Turned to right side. Patient tolerated well. NAD, will continue to monitor. 1100 - MD at bedside to assess. Orders placed for PT/OT and for out of bed into chair with assist. Per MD pending PT evaluation prior to attempting out of bed status. 36 - Family provided durable POA and advance directive paperwork. Copies made and placed in hard chart. Family now at bedside assisting patient with eating lunch. Sitting up in bed, denies any acute needs. NAD, will continue to monitor. 1200 - MRI screen sheet completed with family at bedside and sent to MRI.  Called MRI, confirmed they rcvd. Patient's family did report some mild claustrophobia, per family they think she'll be okay without sedation, informed MRI tech, informed patient is GCS 14, needs to be re-oriented to surroundings. Patient continues to be resting in bed with eyes open. Ate lunch independently, tolerating nectar thick fluids and pureed diet well. Respirations remain even and unlabored. Even chest rise. Patient downgraded to tele, pending possible transfer to tele floor. Denies any acute needs. Family remains at bedside, denying any acute needs. NAD, will continue to monitor. 1345 - Report called to [facility], patient being transported to MRI, RN informed. Tele removed. Called MRI to inform patient should be transferred to room 219 after MRI completed.

## 2018-07-28 NOTE — PROGRESS NOTES
Unable to get patient supine enough to get pt into the MRI Scanner, much less in a coil to be able to image this patient. When laying patient back she starts  Coughing so hard she gags , she is grabbing at the air to pull herself up and states \"I can't breath please sit me up\"  I spoke with Dr. Mae Jameson about inability to scan patient at this time. Dr. Mae Jameson will speak with patient's daughter.

## 2018-07-28 NOTE — PROGRESS NOTES
[facility] - Progress Note Admit Date: 7/26/2018 Assessment:  
 
-AMS, CT head negative for acute CVA 
-HCAP, on abx 
-Likely dehydration -Hypokalemia and  Hypomagnesia, being repleted 
-Pleural effusions, R > L 
-Hx of chronic afib, rate controlled, on Eliquis for OAC 
-Diastolic CHF, compensated, Normal LV function by echo June 23, 2018 with EF 50-55%, moderate to severe MR and severe wide open TR.  
-Pulmonary HTN, 2/2 above 
-Parkinson's disease 
-Morbid obesity 
-Deconditioning, SNF resident, at baseline walks a little 
-COPD 
-Full Code 
  
-Follows with [employer] 
 
Plan: Much more awake today, continue antbx, supportive care. Continue to hold diuretics. Subjective: No new complaints. Objective:  
  
Patient Vitals for the past 8 hrs: 
 Temp Pulse Resp BP SpO2  
07/28/18 0836 - 64 - 105/61 -  
07/28/18 0723 97.4 °F (36.3 °C) 62 20 95/52 95 %  
07/28/18 0300 97.9 °F (36.6 °C) 97 18 112/69 97 % Patient Vitals for the past 96 hrs: 
 Weight  
07/28/18 0300 102.2 kg (225 lb 4.8 oz)  
07/27/18 0400 95.2 kg (209 lb 14.4 oz)  
07/26/18 1413 99.8 kg (220 lb) Intake/Output Summary (Last 24 hours) at 07/28/18 1051 Last data filed at 07/28/18 0800 Gross per 24 hour Intake                0 ml Output              800 ml Net             -800 ml Physical Exam: 
General:  alert, cooperative, no distress, appears stated age Neck:  nontender Lungs:  decreased Heart:  irreg irreg, S1, S2 normal, no murmur, click, rub or gallop Abdomen:  abdomen is soft without significant tenderness, masses, organomegaly or guarding Extremities:  extremities normal, atraumatic, no cyanosis or edema Data Review:  
 
Labs: Results:  
   
Chemistry Recent Labs  
   07/28/18 
 0430  07/27/18 
 0529  07/26/18 
 1350 GLU  172*  132*  189* NA  142  141  140  
K  4.1  2.7*  2.7*  
CL  111*  107  104 CO2  18*  23  24 BUN  12  8  9 CREA  1.79* 1. 16  0.89 CA  7.6*  8.0*  8.3*  
MG  2.0  2.2  1.2*  
PHOS   --   3.6   --   
AGAP  13  11  12 BUCR  7*  7*  10* AP   --    --   80  
TP   --    --   6.8 ALB   --    --   2.9*  
GLOB   --    --   3.9 AGRAT   --    --   0.7* CBC w/Diff Recent Labs  
   07/28/18 
 0637  07/27/18 
 0529  07/26/18 
 1350 WBC  6.1  5.3  8.2  
RBC  3.72*  3.58*  3.68* HGB  10.7*  10.4*  10.9* HCT  32.7*  31.2*  32.6*  
PLT  259  262  279 GRANS  75*  74*  84* LYMPH  14*  18*  9*  
EOS  2  2  0 Cardiac Enzymes No results found for: CPK, CK, CKMMB, CKMB, RCK3, CKMBT, CKNDX, CKND1, NAMAN, TROPT, TROIQ, RONA, TROPT, TNIPOC, BNP, BNPP Coagulation No results for input(s): PTP, INR, APTT in the last 72 hours. No lab exists for component: INREXT Lipid Panel Lab Results Component Value Date/Time Cholesterol, total 118 (L) 03/01/2017 09:54 AM  
 HDL Cholesterol 48 03/01/2017 09:54 AM  
 LDL, calculated 54 03/01/2017 09:54 AM  
 Triglyceride 78 03/01/2017 09:54 AM  
 CHOL/HDL Ratio 2.5 03/01/2017 09:54 AM  
  
BNP Lab Results Component Value Date/Time BNP 88.0 04/12/2016 10:30 AM  
  
Liver Enzymes Recent Labs  
   07/26/18 
 1350 TP  6.8 ALB  2.9* AP  80 SGOT  21  
  
Digoxin Thyroid Studies Lab Results Component Value Date/Time TSH 2.00 07/26/2018 01:50 PM  
    
 
Signed By: Lion Mckeon MD   
 July 28, 2018

## 2018-07-28 NOTE — PROGRESS NOTES
MRI Safety Screening form needs to be filled out and faxed to [phone] MRI can be scheduled. If unable to acquire information from pt, [facility] must be contacted.  If pt is claustrophobic or will need pain meds, please have ordered in advance to help facilitate MRI exam.

## 2018-07-28 NOTE — PROGRESS NOTES
Hospitalist Progress Note Patient: Tomy Valdovinos MRN: [id number]  CSN: [id number] YOB: 1940  Age: 66 y.o. Sex: female DOA: 7/26/2018 LOS:  LOS: 2 days NH3 59. Patient is more alert and started on puree diet per nsg, with thickened liquids. She states her full name is \"Audrey Goins. \" she is \"right here where I was yesterday. \" she denies pain anywhere. Sugars climbing. Discussed w/ daughter / Sheldon Pimentel and MRI brain requested. Assessment/Plan 1. Acute metabolic encephalopathy superimposed on dementia - improving. Daughter requests MRI, but patient is unable to lay flat for study. 2.  HCAP. Blood cx (7/26) ngtd. 3.  Preserved ejection fraction on recent echocardiogram.  Moderate to severe mitral regurgitation, wide-open tricuspid regurgitation. she is not surgical candidate; Cardiology input appreciated. 4.  Chronic atrial fibrillation. on Eliquis and beta blocker. 5.  Anemia, normocytic, normochromic. 6.  Hypokalemia replete as needed. Replete magnesium. 7.  Hypomagnesemia replete as needed. 8.  Right greater than left pleural effusions. Pulmonary input appreciated. 9.  Atelectasis, ICS. 10.  Parkinson's, under the care of Dr. Kenny Chau. 11.  obesity. Body mass index is 37.49 kg/(m^2). 12.  Ambulatory dysfunction. She was walking a couple of months ago, but no longer. 13. excesive somnolence improving. No evidence of CO2 retention; head CT, UA ok. Lactulose for elevated NH3. 14. mild- moderate oropharyngeal dysphagia in setting of AMS - improving - monitor on diet. 15. DM2 with hyperglycemia - diet clarified. Ssi. Check A1c 
16. Deep venous thrombosis prophylaxis. on Eliquis. 17. FULL CODE. step-down. Daughter to bring in copy of advanced directive; will obtain copy to scan into cc. Daughter Caroline Tellez [phone] Additional Notes:   
 
Case discussed with:  [x]Patient  [x]Family  [x]Nursing  []Case Management DVT Prophylaxis:  []Lovenox  []Hep SQ  []SCDs  []Coumadin   []On Heparin gtt   [x] eliquis Vital signs/Intake and Output: 
Visit Vitals  /61  Pulse 64  Temp 97.4 °F (36.3 °C)  Resp 20  
 Ht 5' 5\" (1.651 m)  Wt 102.2 kg (225 lb 4.8 oz)  SpO2 95%  Breastfeeding No  
 BMI 37.49 kg/m2 Current Shift:  07/28 0701 - 07/28 1900 In: -  
Out: 939 [RKBEU:677] Last three shifts:  07/26 1901 - 07/28 0700 In: 450 [I.V.:450] Out: 500 [Urine:500] Awake and alert, oriented x1. Ncat. perrl Irregularly irregular 
cta b.l anterior and infraaxillary fields. abd soft nt nd nabs No edema. dp 2+ b.l Moving all 4s. +asterixis No rash Medications Reviewed Labs: Results:  
   
Chemistry Recent Labs  
   07/28/18 
 0430  07/27/18 
 0529  07/26/18 
 1350 GLU  172*  132*  189* NA  142  141  140  
K  4.1  2.7*  2.7*  
CL  111*  107  104 CO2  18*  23  24 BUN  12  8  9 CREA  1.79*  1.16  0.89 CA  7.6*  8.0*  8.3* AGAP  13  11  12 BUCR  7*  7*  10* AP   --    --   80  
TP   --    --   6.8 ALB   --    --   2.9*  
GLOB   --    --   3.9 AGRAT   --    --   0.7* CBC w/Diff Recent Labs  
   07/28/18 
 0637  07/27/18 
 0529  07/26/18 
 1350 WBC  6.1  5.3  8.2  
RBC  3.72*  3.58*  3.68* HGB  10.7*  10.4*  10.9* HCT  32.7*  31.2*  32.6*  
PLT  259  262  279 GRANS  75*  74*  84* LYMPH  14*  18*  9*  
EOS  2  2  0 Cardiac Enzymes Recent Labs  
   07/26/18 
 1350 CPK  117 CKND1  1.7 Coagulation No results for input(s): PTP, INR, APTT in the last 72 hours. No lab exists for component: INREXT, INREXT Lipid Panel Lab Results Component Value Date/Time Cholesterol, total 118 (L) 03/01/2017 09:54 AM  
 HDL Cholesterol 48 03/01/2017 09:54 AM  
 LDL, calculated 54 03/01/2017 09:54 AM  
 Triglyceride 78 03/01/2017 09:54 AM  
 CHOL/HDL Ratio 2.5 03/01/2017 09:54 AM  
  
BNP No results for input(s): BNPP in the last 72 hours.   
Liver Enzymes Recent Labs 07/26/18 
 1350 TP  6.8 ALB  2.9* AP  80 SGOT  21 Thyroid Studies Lab Results Component Value Date/Time TSH 2.00 07/26/2018 01:50 PM  
    
Procedures/imaging: see electronic medical records for all procedures/Xrays and details which were not copied into this note but were reviewed prior to creation of Plan.

## 2018-07-29 NOTE — PROGRESS NOTES
[facility] - Progress Note Admit Date: 7/26/2018 Assessment:  
 
-AMS, CT head negative for acute CVA 
-HCAP, on abx 
-Likely dehydration -Hypokalemia and  Hypomagnesia, being repleted 
-Pleural effusions, R > L 
-Hx of chronic afib, rate controlled, on Eliquis for OAC 
-Diastolic CHF, compensated, Normal LV function by echo June 23, 2018 with EF 50-55%, moderate to severe MR and severe wide open TR.  
-Pulmonary HTN, 2/2 above 
-Parkinson's disease 
-Morbid obesity 
-Deconditioning, SNF resident, at baseline walks a little 
-COPD 
-Full Code 
   
-Follows with [employer] 
 
Plan:  
 
Rising Cr compared to yesterday, bilateral pleural effusions noted, however, with worsening renal disease, I would avoid diuretics. Subjective: No new complaints. Objective:  
  
Patient Vitals for the past 8 hrs: 
 Temp Pulse Resp BP SpO2  
07/29/18 0813 98 °F (36.7 °C) (!) 48 16 132/66 94 %  
07/29/18 0412 96.8 °F (36 °C) (!) 50 18 127/66 96 % Patient Vitals for the past 96 hrs: 
 Weight  
07/29/18 0412 92.9 kg (204 lb 12.8 oz)  
07/28/18 0300 102.2 kg (225 lb 4.8 oz)  
07/27/18 0400 95.2 kg (209 lb 14.4 oz)  
07/26/18 1413 99.8 kg (220 lb) Intake/Output Summary (Last 24 hours) at 07/29/18 1025 Last data filed at 07/29/18 8251 Gross per 24 hour Intake              900 ml Output                0 ml Net              900 ml Physical Exam: 
General:  alert, cooperative, no distress, appears stated age Neck:  nontender Lungs:  Decreased bases Heart:  erika, S1, S2 normal, no murmur, click, rub or gallop Abdomen:  abdomen is soft without significant tenderness, masses, organomegaly or guarding Extremities:  extremities normal, atraumatic, no cyanosis or edema Data Review:  
 
Labs: Results:  
   
Chemistry Recent Labs  
   07/29/18 
 0912  07/28/18 
 0430  07/27/18 
 0529  07/26/18 
 1350 GLU  248*  172*  132*  189* NA  141  142  141  140 K 3.8  4.1  2.7*  2.7*  
CL  110*  111*  107  104 CO2  17*  18*  23  24 BUN  16  12  8  9 CREA  2.54*  1.79*  1.16  0.89 CA  7.9*  7.6*  8.0*  8.3*  
MG   --   2.0  2.2  1.2*  
PHOS   --    --   3.6   --   
AGAP  14  13  11  12 BUCR  6*  7*  7*  10* AP   --    --    --   80  
TP   --    --    --   6.8 ALB   --    --    --   2.9*  
GLOB   --    --    --   3.9 AGRAT   --    --    --   0.7* CBC w/Diff Recent Labs  
   07/28/18 
 0637  07/27/18 
 0529  07/26/18 
 1350 WBC  6.1  5.3  8.2  
RBC  3.72*  3.58*  3.68* HGB  10.7*  10.4*  10.9* HCT  32.7*  31.2*  32.6*  
PLT  259  262  279 GRANS  75*  74*  84* LYMPH  14*  18*  9*  
EOS  2  2  0 Cardiac Enzymes No results found for: CPK, CK, CKMMB, CKMB, RCK3, CKMBT, CKNDX, CKND1, NAMAN, TROPT, TROIQ, RONA, TROPT, TNIPOC, BNP, BNPP Coagulation No results for input(s): PTP, INR, APTT in the last 72 hours. No lab exists for component: INREXT Lipid Panel Lab Results Component Value Date/Time Cholesterol, total 118 (L) 03/01/2017 09:54 AM  
 HDL Cholesterol 48 03/01/2017 09:54 AM  
 LDL, calculated 54 03/01/2017 09:54 AM  
 Triglyceride 78 03/01/2017 09:54 AM  
 CHOL/HDL Ratio 2.5 03/01/2017 09:54 AM  
  
BNP Lab Results Component Value Date/Time BNP 88.0 04/12/2016 10:30 AM  
  
Liver Enzymes Recent Labs  
   07/26/18 
 1350 TP  6.8 ALB  2.9* AP  80 SGOT  21  
  
Digoxin Thyroid Studies Lab Results Component Value Date/Time TSH 2.00 07/26/2018 01:50 PM  
    
 
Signed By: Lionel Hicks MD   
 July 29, 2018

## 2018-07-29 NOTE — PROGRESS NOTES
Problem: Falls - Risk of 
Goal: *Absence of Falls Document Shakila Earl Fall Risk and appropriate interventions in the flowsheet. Outcome: Progressing Towards Goal 
Fall Risk Interventions: 
  
 
Mentation Interventions: Adequate sleep, hydration, pain control, Bed/chair exit alarm, Door open when patient unattended Medication Interventions: Assess postural VS orthostatic hypotension, Bed/chair exit alarm Elimination Interventions: Toileting schedule/hourly rounds, Patient to call for help with toileting needs History of Falls Interventions: Bed/chair exit alarm Problem: Pressure Injury - Risk of 
Goal: *Prevention of pressure injury Document Julián Scale and appropriate interventions in the flowsheet. Outcome: Progressing Towards Goal 
Pressure Injury Interventions: 
Sensory Interventions: Assess changes in LOC, Check visual cues for pain, Float heels Moisture Interventions: Absorbent underpads, Internal/External urinary devices Activity Interventions: Pressure redistribution bed/mattress(bed type), PT/OT evaluation Mobility Interventions: Assess need for specialty bed, PT/OT evaluation Nutrition Interventions: Document food/fluid/supplement intake Friction and Shear Interventions: Lift sheet, HOB 30 degrees or less

## 2018-07-29 NOTE — ROUTINE PROCESS
Bedside shift report given to Diego Perez  RN on coming nurse by Christiano Oseguera RN off going nurse including Marybeth Schultz, SB[facility] and [facility]

## 2018-07-29 NOTE — PROGRESS NOTES
[facility] Hospitalist Group Progress Note Patient: [facility] Age: 66 y.o. : 1940 MR#: [id number] SSN: [id number] Date/Time: 2018 Subjective:  
 
Pt alert and awake, oriented to self only, had no complaints. Assessment/Plan:  
-Acute metabolic encephalopathy- seems to be at baseline, likely due to underlying infection? -HCAP on vanc and zosyn, pulmonology following 
-Chronic diastolic HF compensated cardiology following, agree w/ holding diuretics in light of elevated creat. 
-Acute on chronic renal failure cause unclear ?pre renal due to dehydration, diuretic use pta? Cozaar also one of her dc meds from recent hospitalization. 
-Right greater than left pleural effusion-pulmonary following. 
-Impaired mobility-? Due to de conditioning, PT/OT consulted 
-History of moderate to severe MR and wide open TR no active issues at this time 
-history of chronic afib on eliquis 
-history of Parkinson's disease 
-history of pulm HTN, COPD-stable 
-recent admission to a [facility] for management of acute on chronic combined systolic and diastolic HF. PLAN: 
-cont iv abx, bronchial hygiene, aspiration precautions, PRN BD. 
-cont to hold diuresis 
-cont to follow creat further w/u if cont'd elevation despite holding lasix and cozaar. 
-cont eliquis for now -PT/OT ordered Additional Notes:   
 
Case discussed with:  [x]Patient  []Family  [x]Nursing  []Case Management DVT Prophylaxis:  []Lovenox  []Hep SQ  []SCDs  []Coumadin   []On Heparin gtt ON ELIQUIS Objective:  
VS:  
Visit Vitals  /64 (BP 1 Location: Left arm, BP Patient Position: At rest)  Pulse (!) 45  Temp 98.4 °F (36.9 °C)  Resp 16  
 Ht 5' 5\" (1.651 m)  Wt 92.9 kg (204 lb 12.8 oz)  SpO2 92%  Breastfeeding No  
 BMI 34.08 kg/m2 Tmax/24hrs: Temp (24hrs), Av.3 °F (36.3 °C), Min:96.6 °F (35.9 °C), Max:98.4 °F (36.9 °C) Input/Output:  
Intake/Output Summary (Last 24 hours) at 07/29/18 1327 Last data filed at 07/29/18 7870 Gross per 24 hour Intake              900 ml Output                0 ml Net              900 ml General: alert awake in nad Cardiovascular: rrr, no murmurs Pulmonary: ctab GI:  Soft, nt, nd 
Extremities:  No edema Additional:   
 
Labs:   
Recent Results (from the past 24 hour(s)) GLUCOSE, POC Collection Time: 07/28/18  9:37 PM  
Result Value Ref Range Glucose (POC) 237 (H) 70 - 110 mg/dL Rudolpho Reagin Collection Time: 07/29/18  4:03 AM  
Result Value Ref Range Vancomycin, random 20.0 5.0 - 40.0 UG/ML  
HEMOGLOBIN A1C WITH EAG Collection Time: 07/29/18  4:03 AM  
Result Value Ref Range Hemoglobin A1c 8.5 (H) 4.2 - 5.6 % Est. average glucose 197 mg/dL GLUCOSE, POC Collection Time: 07/29/18  8:04 AM  
Result Value Ref Range Glucose (POC) 218 (H) 70 - 110 mg/dL METABOLIC PANEL, BASIC Collection Time: 07/29/18  9:12 AM  
Result Value Ref Range Sodium 141 136 - 145 mmol/L Potassium 3.8 3.5 - 5.5 mmol/L Chloride 110 (H) 100 - 108 mmol/L  
 CO2 17 (L) 21 - 32 mmol/L Anion gap 14 3.0 - 18 mmol/L Glucose 248 (H) 74 - 99 mg/dL BUN 16 7.0 - 18 MG/DL Creatinine 2.54 (H) 0.6 - 1.3 MG/DL  
 BUN/Creatinine ratio 6 (L) 12 - 20 GFR est AA 22 (L) >60 ml/min/1.73m2 GFR est non-AA 18 (L) >60 ml/min/1.73m2 Calcium 7.9 (L) 8.5 - 10.1 MG/DL  
NT-PRO BNP Collection Time: 07/29/18  9:12 AM  
Result Value Ref Range NT pro-BNP 5451 (H) 0 - 1800 PG/ML  
GLUCOSE, POC Collection Time: 07/29/18 11:22 AM  
Result Value Ref Range Glucose (POC) 252 (H) 70 - 110 mg/dL Additional Data Reviewed:   
 
Signed By: Maddie Hernandez MD   
 July 29, 2018 1:27 PM

## 2018-07-29 NOTE — PROGRESS NOTES
[facility] Pulmonary, Critical Care, and Sleep Medicine Initial Patient Follow Up Name: Lawrence Dakins MRN: [id number] : 1940 Hospital: [facility] Date: 2018 IMPRESSION:  
-Acute metabolic encephalopathy- toxic/metabolic superimposed on dementia. -Healthcare-associated pneumonia- started on broad-spectrum antibiotics for now.  
 
-Acute on chronic renal failure- most likely aggravated by diruesis - Creat elevated today 
 
-Chronic diastolic heart failure-Preserved ejection fraction on recent echocardiogram.  Moderate to severe mitral regurgitation, wide-open tricuspid regurgitation. Chronic atrial fibrillation.   
 
-Right greater than left pleural effusions- chronic diastolic heart failure and poor cough with basal atelectasis 
 
-Parkinson's,- under the care of Dr. Sharath Laws. -Morbid obesity. 
 
-Ambulatory dysfunction. She was walking a couple of months ago, but no longer. 
 
-Deep venous thrombosis prophylaxis. -on Eliquis. SHE IS A FULL CODE. RECOMMENDATIONS:  
· Oxygen- titrate to SaO2 goal > 90% · CXR- AP/LAT in am 
· Bronchial hygiene protocol · Bronchodilators prn · Aspiration precautions · Diuresis per primary team 
· Continue Eliquis. -If procedure is anticipated would transition to heparin drip- would pursue thoracentesis as unable to diuresis patient but need to be off Eliquis 48 hours · Continue antibiotics per primary team. 
· Continue work up for altered mental status · Assess home Oxygen needs at discharge · OT, PT, OOB and ambulate · Other medical management per primary team and respective consultants Subjective: This patient has been seen and evaluated at the request of Dr. Mikie Mehta for HAP. History obtained from chart review- patient not conversant.  
 
Patient is a 66 y.o. female This is a 72-year-old -American female with a past medical history of Parkinson disease, recent admission for CHF at [facility], she has an EF of 51-55%, moderate to severe MR and wide-open TR with pulmonary hypertension. She also has chronic atrial fibrillation, maintained on Eliquis. Her cardiologist is in [facility]. During her admission at [facility] she was diuresed. She had returned to [facility] The daughter at the bedside provides most of the history and she states that the patient is not a candidate for rehab because she does not recall the exercises and what she was taught there. She was brought into the ED sent by [employer] for altered mental status. In the ED, she was noted to have a potassium of 2.7 and a magnesium of 1.2. She has bilateral pleural effusions, right greater than left, with likely bilateral infiltrates as well as atelectasis. Her daughter relates that they had appealed her discharge at [facility], but she ultimately had been discharged back to [facility]. She feels that the patient needed more time in the hospital.  There was report of fever from [facility], a scant cough. The patient described pain in her legs and cramping. There was no nausea or vomiting, but there was a report of decreased appetite. No report of foul odor to her urine. She did have some diarrhea during her recent hospital stay. At her baseline, she walks a little. She was walking just a couple of months ago. The daughter also states that at her baseline mental status, she knows her date of birth, but she does not know the day of the week or the year. No further review of systems is obtainable related to the patient's altered mental status. In the ED, she received Zosyn as well as Tylenol, magnesium sulfate 4 grams IV x1, three runs of potassium. She also received a 1-liter bolus of normal saline as well as vancomycin. 
 
07/29/18 
- patient resting in bed, On room air - Less responsive to me today but arousable and follows limited commands 
-Persistent pleural effusions on CXR 
- BUN/ Creat elevated today - Remains on Eliquis - Afebrile - WBC normal 
 
 
Patient Vitals for the past 12 hrs: 
 Temp Pulse Resp BP SpO2  
07/29/18 0813 98 °F (36.7 °C) (!) 48 16 132/66 94 %  
07/29/18 0412 96.8 °F (36 °C) (!) 50 18 127/66 96 %  
07/28/18 2318 97 °F (36.1 °C) (!) 53 18 107/59 96 % Past Medical History:  
Diagnosis Date  Anemia NEC  Arthritis  Arthropathy, unspecified, site unspecified  Asthma  Atrial fibrillation ([facility] [address] CHRONIC A-FIB  Diabetes mellitus  Essential hypertension  Heart abnormalities   
 irregular heartbeat  Hypertension  Liver disease   
 steatohepatitis  Neurological disorder   
 dementia  Osteoarthritis LEFT HIP Past Surgical History:  
Procedure Laterality Date  HX BUNIONECTOMY  HX HIP REPLACEMENT  11/2014  HX OTHER SURGICAL  5/27/12  
 colon mass removed  HX TUBAL LIGATION Prior to Admission medications Medication Sig Start Date End Date Taking? Authorizing Provider  
acetaminophen (TYLENOL) 325 mg tablet Take 2 Tabs by mouth every six (6) hours as needed for Pain or Fever. Indications: Arthritic Pain, Fever 6/26/18   Anayeli Galvan MD  
albuterol (PROVENTIL HFA, VENTOLIN HFA, PROAIR HFA) 90 mcg/actuation inhaler Take 1 Puff by inhalation every six (6) hours as needed (Asthma/cough). Indications: Acute Asthma Attack 6/26/18   Anayeli Galvan MD  
albuterol (PROVENTIL VENTOLIN) 2.5 mg /3 mL (0.083 %) nebulizer solution 3 mL by Nebulization route every four (4) hours as needed for Wheezing. Indications: Chronic Obstructive Pulmonary Disease 6/26/18   Anayeli Galvan MD  
amiodarone (CORDARONE) 200 mg tablet Take 1 Tab by mouth daily. Indications: Cardioversion of Atrial Fibrillation 6/26/18   Anayeli Galvan MD  
apixaban (ELIQUIS) 5 mg tablet Take 1 Tab by mouth two (2) times a day.  Indications: Cerebral Thromboembolism Prevention 6/26/18   Kush Kelley MD  
aspirin delayed-release 81 mg tablet Take 1 Tab by mouth daily. Indications: CAD 6/26/18   Kush Kelley MD  
cholecalciferol (VITAMIN D3) 1,000 unit cap Take 2 Caps by mouth daily. Daughter is unsure about the dosage. Indications: Osteoporosis, Vitamin D Deficiency 6/26/18   Kush Kelley MD  
famotidine (PEPCID) 20 mg tablet Take 1 Tab by mouth nightly. Indications: gastroesophageal reflux disease 6/26/18   Kush Kelley MD  
FLUoxetine (PROZAC) 20 mg tablet Take 2 Tabs by mouth nightly. Indications: ANXIETY WITH DEPRESSION 6/26/18   Kush Kelley MD  
furosemide (LASIX) 20 mg tablet Take 1 Tab by mouth daily as needed. Indications: Edema 6/26/18   Kush Kelley MD  
glipiZIDE SR (GLUCOTROL XL) 10 mg CR tablet Take 1 Tab by mouth two (2) times a day. 1 in the morning and 1 at bedtime. Indications: type 2 diabetes mellitus 6/26/18   Kush Kellye MD  
insulin lispro (HUMALOG) 100 unit/mL injection SubCUTAneous route Before breakfast, lunch, dinner and at bedtime. For Blood Sugar (mg/dL) of:    
Less than 150 =   0 units 150 -199 =   2 units 200 -249 =   4 units 250 -299 =   6 units 300 -349 =   8 units 350 and above =  10 units  Indications: type 2 diabetes mellitus 6/26/18   Kush Kelley MD  
losartan (COZAAR) 25 mg tablet Take 1 Tab by mouth daily. Indications: hypertension 6/26/18   Kush Kelley MD  
Menthol-Zinc Oxide (CALMOSEPTINE) 0.44-20.6 % oint Apply 1 g to affected area three (3) times daily as needed (For rash, apply to inner buttocks). Indications: Diaper Rash 6/26/18   Kush Kelley MD  
metFORMIN (GLUCOPHAGE) 1,000 mg tablet Take 1 Tab by mouth two (2) times daily (with meals).  Indications: type 2 diabetes mellitus 6/26/18   Kush Kelley MD  
metoprolol tartrate (LOPRESSOR) 25 mg tablet Take 1 Tab by mouth every twelve (12) hours. Indications: VENTRICULAR RATE CONTROL IN ATRIAL FIBRILLATION 6/26/18   Leonora Davies MD  
mometasone [facility] DISTRICT TWISTHALER) 220 mcg (120 doses) aepb inhaler Take 1 Puff by inhalation daily. Powder that patient inhales. 220mcg. Indications: MAINTENANCE THERAPY FOR ASTHMA 6/26/18   Leonora Davies MD  
montelukast (SINGULAIR) 10 mg tablet Take 1 Tab by mouth daily. Indications: MAINTENANCE THERAPY FOR ASTHMA 6/26/18   Leonora Davies MD  
polyethylene glycol (MIRALAX) 17 gram/dose powder Take 17 g by mouth daily as needed. Indications: constipation 6/26/18   Leonora Davies MD  
prednisoLONE acetate (PRED FORTE) 1 % ophthalmic suspension Administer 1 Drop to left eye two (2) times a day. Please verify with  Patient's daughter the percentage. Thanks,   Indications: PREVENTION OF CYTARABINE-INDUCED KERATOCONJUNCTIVITIS 6/26/18   Leonora Davies MD  
QUEtiapine (SEROQUEL) 25 mg tablet Take 0.5 Tabs by mouth two (2) times a day. Indications: Generalized Anxiety Disorder 6/26/18   Leonora Davies MD  
risperiDONE (RISPERDAL) 1 mg tablet Take 1 Tab by mouth nightly. Indications: BIPOLAR DISORDER IN REMISSION, DEPRESSION ASSOCIATED WITH BIPOLAR DISORDER 6/26/18   Leonora Davies MD  
simvastatin (ZOCOR) 10 mg tablet Take 1 Tab by mouth nightly. Indications: hypertriglyceridemia 6/26/18   Leonora Davies MD  
risperiDONE (RISPERDAL) 1 mg tablet Take 1 Tab by mouth nightly. 6/24/18   Yue Andrea MD  
 
Allergies Allergen Reactions  Compazine [Prochlorperazine Edisylate] Other (comments) Twitching, seizure-like symptoms Social History Substance Use Topics  Smoking status: Never Smoker  Smokeless tobacco: Never Used  Alcohol use No  
  
Family History Problem Relation Age of Onset  Hypertension Mother  Diabetes Father  Diabetes Daughter Current Facility-Administered Medications Medication Dose Route Frequency  vancomycin (VANCOCIN) 1,000 mg in 0.9% sodium chloride (MBP/ADV) 250 mL adv  1,000 mg IntraVENous ONCE  
 VANCOMYCIN INFORMATION NOTE   Other CONTINUOUS  
 lactulose (CHRONULAC) solution 20 g  20 g Oral BID  insulin lispro (HUMALOG) injection   SubCUTAneous AC&HS  insulin glargine (LANTUS) injection 4 Units  4 Units SubCUTAneous QHS  FLUoxetine (PROzac) capsule 20 mg  20 mg Oral DAILY  QUEtiapine (SEROquel) tablet 12.5 mg  12.5 mg Oral QHS  risperiDONE (RisperDAL) tablet 0.5 mg  0.5 mg Oral QHS  piperacillin-tazobactam (ZOSYN) 3.375 g in 0.9% sodium chloride (MBP/ADV) 100 mL ADV  3.375 g IntraVENous Q6H  
 magnesium oxide (MAG-OX) tablet 400 mg  400 mg Oral DAILY  aspirin delayed-release tablet 81 mg  81 mg Oral DAILY  apixaban (ELIQUIS) tablet 5 mg  5 mg Oral BID  
 amiodarone (CORDARONE) tablet 200 mg  200 mg Oral DAILY  cholecalciferol (VITAMIN D3) tablet 2,000 Units  2,000 Units Oral DAILY  famotidine (PEPCID) tablet 20 mg  20 mg Oral QHS  metoprolol tartrate (LOPRESSOR) tablet 25 mg  25 mg Oral Q12H  prednisoLONE acetate (PRED FORTE) 1 % ophthalmic suspension 1 Drop  1 Drop Left Eye BID  simvastatin (ZOCOR) tablet 10 mg  10 mg Oral QHS Review of Systems: 
Review of systems not obtained due to patient factors. Objective:  
Vital Signs:   
Visit Vitals  /66 (BP 1 Location: Left arm, BP Patient Position: At rest)  Pulse (!) 48  Temp 98 °F (36.7 °C)  Resp 16  
 Ht 5' 5\" (1.651 m)  Wt 92.9 kg (204 lb 12.8 oz)  SpO2 94%  Breastfeeding No  
 BMI 34.08 kg/m2 O2 Device: Nasal cannula O2 Flow Rate (L/min): 2 l/min Temp (24hrs), Av.2 °F (36.2 °C), Min:96.6 °F (35.9 °C), Max:98 °F (36.7 °C) Intake/Output:  
Last shift:        
Last 3 shifts:  1901 -  0700 In: 900 [P.O.:200; I.V.:700] Out: 400 [WGMXR:572] Intake/Output Summary (Last 24 hours) at 07/29/18 1018 Last data filed at 07/29/18 0213 Gross per 24 hour Intake              900 ml Output                0 ml Net              900 ml Physical Exam:  
General:  Appears stated age. , less interactive today Head:  Normocephalic, without obvious abnormality, atraumatic. Eyes:  Conjunctivae/corneas clear. PERRL, EOMs intact. Nose: Nares normal. Septum midline. Mucosa normal. No drainage or sinus tenderness. Throat: Lips, mucosa, and tongue normal.- No exudates Neck: Supple, symmetrical, trachea midline, no adenopathy, thyroid: no enlargment/tenderness/nodules, no carotid bruit and + JVD. Back:   Symmetric, no curvature. ROM normal.  
Lungs:   Clear to auscultation bilaterally. - diminished at bases, no wheezing or rhonchi Chest wall:  No tenderness or deformity. Heart:  Regular rate and rhythm, S1, S2 normal, + murmur, No click, rub or gallop. Abdomen:   Soft, non-tender. Bowel sounds normal. No masses,  No organomegaly. Extremities: Extremities normal, atraumatic, no cyanosis or edema. Pulses: 2+ and symmetric all extremities. Skin: Skin color, texture, turgor normal. No rashes or lesions Lymph nodes: Cervical, supraclavicular nodes, non-palpable Neurologic: Grossly nonfocal  
 
Data review:  
 
 
 
 
Results for Elio Solares (MRN [id number]) as of 7/29/2018 10:11 Ref. Range 7/27/2018 05:29 7/28/2018 04:30 7/28/2018 06:37 7/29/2018 04:03 7/29/2018 09:12 BUN Latest Ref Range: 7.0 - 18 MG/DL 8 12   16 Creatinine Latest Ref Range: 0.6 - 1.3 MG/DL 1.16 1.79 (H)   2.54 (H) Results for lEio Solares (MRN [id number]) as of 7/29/2018 10:11 Ref. Range 6/25/2018 02:46 6/26/2018 03:18 7/26/2018 13:50 7/27/2018 05:29 7/28/2018 06:37 WBC Latest Ref Range: 4.6 - 13.2 K/uL 6.6 5.9 8.2 5.3 6.1 Lab Results Component Value Date/Time  BNP 88.0 04/12/2016 10:30 AM  
 NT pro-BNP 6620 (H) 07/26/2018 01:50 PM  
 NT pro- 08/08/2017 06:45 PM  
 NT pro-BNP 1646.8 (H) 02/28/2017 05:33 PM  
 
 
 
Recent Results (from the past 24 hour(s)) GLUCOSE, POC Collection Time: 07/28/18 10:50 AM  
Result Value Ref Range Glucose (POC) 231 (H) 70 - 110 mg/dL GLUCOSE, POC Collection Time: 07/28/18  9:37 PM  
Result Value Ref Range Glucose (POC) 237 (H) 70 - 110 mg/dL Timoteo Alexander Collection Time: 07/29/18  4:03 AM  
Result Value Ref Range Vancomycin, random 20.0 5.0 - 40.0 UG/ML  
HEMOGLOBIN A1C WITH EAG Collection Time: 07/29/18  4:03 AM  
Result Value Ref Range Hemoglobin A1c 8.5 (H) 4.2 - 5.6 % Est. average glucose 197 mg/dL GLUCOSE, POC Collection Time: 07/29/18  8:04 AM  
Result Value Ref Range Glucose (POC) 218 (H) 70 - 110 mg/dL METABOLIC PANEL, BASIC Collection Time: 07/29/18  9:12 AM  
Result Value Ref Range Sodium 141 136 - 145 mmol/L Potassium 3.8 3.5 - 5.5 mmol/L Chloride 110 (H) 100 - 108 mmol/L  
 CO2 17 (L) 21 - 32 mmol/L Anion gap 14 3.0 - 18 mmol/L Glucose 248 (H) 74 - 99 mg/dL BUN 16 7.0 - 18 MG/DL Creatinine 2.54 (H) 0.6 - 1.3 MG/DL  
 BUN/Creatinine ratio 6 (L) 12 - 20 GFR est AA 22 (L) >60 ml/min/1.73m2 GFR est non-AA 18 (L) >60 ml/min/1.73m2 Calcium 7.9 (L) 8.5 - 10.1 MG/DL Imaging: 
I have personally reviewed the patients radiographs and have reviewed the reports: CXR Results  (Last 48 hours) None 7/28/18 CXR- PA/LAT EXAM: Chest Radiographs 
  
INDICATION: Pleural effusion 
  
TECHNIQUE: PA and lateral views of the chest 
  
COMPARISON: 7/26/2018 
  
FINDINGS: No pneumothorax identified. Moderate to large bilateral pleural 
effusions right greater than left are noted. Bibasilar opacities which may 
resent atelectasis or infiltrate. Cardiomegaly is present. The pulmonary 
vascularity is unremarkable. The osseous structures are unremarkable. 
  
IMPRESSION IMPRESSION: 
1.   Moderate to large bilateral pleural effusions right greater than left. CT Results  (Last 48 hours) None PFT Results  (Last 48 hours) None Echo Results  (Last 48 hours) None High complexity decision making was performed during the evaluation of this patient at high risk for decompensation with multiple organ involvement 
  
Above mentioned total time spent on reviewing the case/medical record/data/notes/EMR/patient examination/documentation/coordinating care with nurse/consultants, exclusive of procedures with complex decision making performed and > 50% time spent in face to face evaluation. Nava Morgan DO, [facility], Critical Care Medicine Clinical care, chart review and time spent coordinating care: 25 min

## 2018-07-30 NOTE — ROUTINE PROCESS
Bedside and Verbal shift change report given to Jeffry Koyanagi (oncoming nurse) by Christiano Walker (offgoing nurse). Report included the following information Kardex, Intake/Output and MAR.

## 2018-07-30 NOTE — PROGRESS NOTES
Problem: Falls - Risk of 
Goal: *Absence of Falls Document Darian [place] Fall Risk and appropriate interventions in the flowsheet. Outcome: Progressing Towards Goal 
Fall Risk Interventions: 
  
 
Mentation Interventions: Bed/chair exit alarm, Door open when patient unattended, Evaluate medications/consider consulting pharmacy Medication Interventions: Assess postural VS orthostatic hypotension, Bed/chair exit alarm, Evaluate medications/consider consulting pharmacy, Patient to call before getting OOB, Teach patient to arise slowly Elimination Interventions: Bed/chair exit alarm, Call light in reach, Elevated toilet seat, Patient to call for help with toileting needs, Toilet paper/wipes in reach, Toileting schedule/hourly rounds History of Falls Interventions: Bed/chair exit alarm, Door open when patient unattended Problem: Pressure Injury - Risk of 
Goal: *Prevention of pressure injury Document Julián Scale and appropriate interventions in the flowsheet. Outcome: Progressing Towards Goal 
Pressure Injury Interventions: 
Sensory Interventions: Assess changes in LOC Moisture Interventions: Absorbent underpads, Apply protective barrier, creams and emollients Activity Interventions: Assess need for specialty bed, Increase time out of bed, Pressure redistribution bed/mattress(bed type) Mobility Interventions: Assess need for specialty bed, HOB 30 degrees or less Nutrition Interventions: Offer support with meals,snacks and hydration Friction and Shear Interventions: Apply protective barrier, creams and emollients, HOB 30 degrees or less

## 2018-07-30 NOTE — PROGRESS NOTES
[facility] Hospitalist Group Progress Note Patient: Richard Cool Age: 66 y.o. : 1940 MR#: [id number] SSN: [id number] Date/Time: 2018 Subjective:  
 
Pt somnolent. Daughter at bedside. Assessment/Plan:  
-Acute metabolic encephalopathy- likely a combination of factors. Sounds like pt has underlying dementia, superimposed delirium likely especially given multiple hospitalizations in the recent past and underlying cardiopulmonary issues as detailed below. -HCAP on vanc and zosyn, pulmonology following, discussed plan of care w// pulmonologist, plans to de escalate abx in the near future. 
-Chronic diastolic HF compensated cardiology following, agree w/ holding diuretics in light of elevated creat. 
-Acute on chronic renal failure cause unclear ?pre renal due to dehydration, diuretic use pta? Cozaar also one of her dc meds from recent hospitalization. 
-Right greater than left pleural effusion-pulmonary following. Possible thoracentesis w/ help of IR once off oac for appropriate time. 
-Impaired mobility-? Due to de conditioning, PT/OT consulted 
-History of moderate to severe MR and wide open TR . 
-history of chronic afib on eliquis, to be held in preparation for thoracentesis. -history of Parkinson's disease 
-history of pulm HTN, COPD-stable 
-recent admission to a [facility] for management of acute on chronic combined systolic and diastolic HF. PLAN: 
-cont iv abx, bronchial hygiene, aspiration precautions, PRN BD. 
-cont to hold diuresis 
-cont to follow creat further w/u if cont'd elevation despite holding lasix and cozaar. -IR consult for thoracentesis 
- hold eliquis for now -PT/OT ordered DVT prophyx, eliquis held, last dose 07/30 am, will start heparin dvt prophyx tomorrow while eliquis is on hold Disposition: daughter interested in Hospice assistance. Hospice consulted.  Anticipate dc in 48 hrs after thoracentesis and course of IV abx for rx of HCAP Additional Notes:   
 
Case discussed with:  [x]Patient  []Family  [x]Nursing  []Case Management DVT Prophylaxis:  []Lovenox  []Hep SQ  []SCDs  []Coumadin   []On Heparin gtt Objective:  
VS:  
Visit Vitals  /71 (BP 1 Location: Left arm, BP Patient Position: At rest)  Pulse (!) 48  Temp 97.3 °F (36.3 °C)  Resp 18  Ht 5' 5\" (1.651 m)  Wt 94.1 kg (207 lb 8 oz)  SpO2 95%  Breastfeeding No  
 BMI 34.53 kg/m2 Tmax/24hrs: Temp (24hrs), Av.2 °F (36.2 °C), Min:96.8 °F (36 °C), Max:97.4 °F (36.3 °C) Input/Output:  
 
Intake/Output Summary (Last 24 hours) at 18 1538 Last data filed at 18 1027 Gross per 24 hour Intake               30 ml Output                0 ml Net               30 ml General: somnolent wakes up easily to verbal stimulus in nad Cardiovascular: rrr, no murmurs Pulmonary: ctab GI:  Soft, nt, nd 
Extremities:  No edema Additional:   
 
Labs:   
Recent Results (from the past 24 hour(s)) GLUCOSE, POC Collection Time: 18  4:53 PM  
Result Value Ref Range Glucose (POC) 162 (H) 70 - 110 mg/dL GLUCOSE, POC Collection Time: 18  8:56 PM  
Result Value Ref Range Glucose (POC) 123 (H) 70 - 110 mg/dL Verta Abel Collection Time: 18  2:31 AM  
Result Value Ref Range Vancomycin, random 15.8 5.0 - 03.1 UG/ML  
METABOLIC PANEL, BASIC Collection Time: 18  2:31 AM  
Result Value Ref Range Sodium 143 136 - 145 mmol/L Potassium 3.2 (L) 3.5 - 5.5 mmol/L Chloride 110 (H) 100 - 108 mmol/L  
 CO2 22 21 - 32 mmol/L Anion gap 11 3.0 - 18 mmol/L Glucose 124 (H) 74 - 99 mg/dL BUN 17 7.0 - 18 MG/DL Creatinine 2.54 (H) 0.6 - 1.3 MG/DL  
 BUN/Creatinine ratio 7 (L) 12 - 20 GFR est AA 22 (L) >60 ml/min/1.73m2 GFR est non-AA 18 (L) >60 ml/min/1.73m2 Calcium 8.0 (L) 8.5 - 10.1 MG/DL  
GLUCOSE, POC  Collection Time: 18  7:39 AM  
Result Value Ref Range Glucose (POC) 133 (H) 70 - 110 mg/dL GLUCOSE, POC Collection Time: 07/30/18 11:20 AM  
Result Value Ref Range Glucose (POC) 167 (H) 70 - 110 mg/dL Additional Data Reviewed:   
 
Signed By: Natalie Colindres MD   
 July 30, 2018 1:27 PM

## 2018-07-30 NOTE — ROUTINE PROCESS
Bedside shift report given to Diego Perez,  RN on coming nurse by Gokul Fatima RN off going nurse including Marybeth Schultz, SBAR and [facility]

## 2018-07-30 NOTE — PROGRESS NOTES
Bedside shift change report given to Marshal Medeiros (oncoming nurse) by Kaylah Mendosa RN (offgoing nurse). Report included the following information SBAR.

## 2018-07-30 NOTE — PROGRESS NOTES
Problem: Mobility Impaired (Adult and Pediatric) Goal: *Acute Goals and Plan of Care (Insert Text) Physical Therapy Goals Initiated 7/30/2018 and to be accomplished within 7 day(s) 1. Patient will move from supine to sit and sit to supine  and roll side to side in bed with minimal assistance/contact guard assist.    
2.  Patient will transfer from bed to chair and chair to bed with minimal assistance/contact guard assist using the least restrictive device. 3.  Patient will perform sit to stand with minimal assistance/contact guard assist. 
4.  Patient will ambulate with minimal assistance/contact guard assist for >5 feet with the least restrictive device. Outcome: Progressing Towards Goal 
physical Therapy EVALUATION Patient: Isauro Lewis (32 y.o. female) Date: 7/30/2018 Primary Diagnosis: HCAP (healthcare-associated pneumonia) Precautions: Fall, Skin OBJECTIVE/ASSESSMENT : 
Based on the objective data described below, the patient presents with impaired functional mobility including bed mobility, transfers, ambulation, and general activity tolerance following admission for pneumonia. Patient presented today semi-reclined in bed, confused but alert and agreeable to PT evaluation. Upon inspection, patient found to be soiled with red-tinged BM. Appreciate assistance of STACEY Payne) for pericare and linen change while PT completed bed mobility with patient, required MaxA for bilateral rolling and scooting to [facility]. Patient became increasingly fatigued at this time, was left resting comfortably in bed with call bell in reach, needs met, and nurse notified. Education: bed mobility, body mechanics, position change -- patient verbalized/demonstrated understanding Patient will benefit from skilled intervention to address the above impairments. Patients rehabilitation potential is considered to be Fair Factors which may influence rehabilitation potential include:  
[]         None noted [] Mental ability/status []         Medical condition 
[]         Home/family situation and support systems 
[]         Safety awareness 
[]         Pain tolerance/management 
[]         Other: PLAN : 
Recommendations and Planned Interventions: 
[x]           Bed Mobility Training             [x]    Neuromuscular Re-Education 
[x]           Transfer Training                   []    Orthotic/Prosthetic Training 
[x]           Gait Training                          []    Modalities [x]           Therapeutic Exercises          []    Edema Management/Control 
[x]           Therapeutic Activities            [x]    Patient and Family Training/Education 
[]           Other (comment): Frequency/Duration: Patient will be followed by physical therapy 1-2 times per day, 4-7 days per week to address goals. Discharge Recommendations: Mykel Tariq Further Equipment Recommendations for Discharge: TBD SUBJECTIVE:  
Patient stated Rabia Ruano need to turn the heat on in here.  OBJECTIVE DATA SUMMARY:  
 
Past Medical History:  
Diagnosis Date  Anemia NEC  Arthritis  Arthropathy, unspecified, site unspecified  Asthma  Atrial fibrillation ([facility] [address] CHRONIC A-FIB  Diabetes mellitus  Essential hypertension  Heart abnormalities   
 irregular heartbeat  Hypertension  Liver disease   
 steatohepatitis  Neurological disorder   
 dementia  Osteoarthritis LEFT HIP Past Surgical History:  
Procedure Laterality Date  HX BUNIONECTOMY  HX HIP REPLACEMENT  11/2014  HX OTHER SURGICAL  5/27/12  
 colon mass removed  HX TUBAL LIGATION Barriers to Learning/Limitations: yes;  altered mental status (i.e.Sedation, Confusion) Compensate with: Visual Cues, Verbal Cues and Tactile Cues Prior Level of Function/Home Situation: Patient resides at [facility].  Per chart review, patient primarily used wheelchair for mobility, was ambulatory several months ago. 
Home Situation Home Environment: Assisted living Care Facility Name: [address] [place] [address] One/Two Story Residence: One story Living Alone: No 
Support Systems: Assisted living, Family member(s) Patient Expects to be Discharged to[de-identified] Assisted living Current DME Used/Available at Home: Wheelchair Critical Behavior: 
Neurologic State: Alert;Confused Psychosocial 
Patient Behaviors: Calm;Confused Purposeful Interaction: Yes Pt Identified Daily Priority: Clinical issues (comment) Caritas Process: Nurture loving kindness Caring Interventions: Therapeutic modalities Reassure: Story tellings Therapeutic Modalities: Intentional therapeutic touch Strength:   
Strength: Generally decreased, functional (BLE) Tone & Sensation:  
Tone: Normal (BLE) Sensation: Intact (BLE) Range Of Motion: 
AROM: Generally decreased, functional (BLE) Functional Mobility: 
Bed Mobility: 
Rolling: Maximum assistance Scooting: Maximum assistance;Assist x2 Transfers: 
Sit to Stand:  (N/A) Balance: N/A Ambulation/Gait Training: N/A Pain: 
Pre session: 0/10 Post session: 0/10 Activity Tolerance:  
Fair Please refer to the flowsheet for vital signs taken during this treatment. After treatment:  
[] Patient left in no apparent distress sitting up in chair 
[] Patient left sitting on EOB [x] Patient left in no apparent distress in bed 
[] Patient declined to be OOB at this time due to 
[x] Call bell left within reach [x] Nursing notified 
[] Caregiver present 
[] Bed alarm activated 
[] SCDs in place COMMUNICATION/EDUCATION:  
[x]         Fall prevention education was provided and the patient/caregiver indicated understanding. [x]         Patient/family have participated as able in goal setting and plan of care. [x]         Patient/family agree to work toward stated goals and plan of care. []         Patient understands intent and goals of therapy, but is neutral about his/her participation.  
[]         Patient is unable to participate in goal setting and plan of care. Thank you for this referral. 
Amberly Mikerosa Time Calculation: 18 mins Mobility [id number] Current  CM= 80-99%   Goal  CJ= 20-39%. The severity rating is based on the Level of Assistance required for Functional Mobility and ADLs.  
 
Eval Complexity: History: MEDIUM  Complexity : 1-2 comorbidities / personal factors will impact the outcome/ POC Exam:MEDIUM Complexity : 3 Standardized tests and measures addressing body structure, function, activity limitation and / or participation in recreation  Presentation: MEDIUM Complexity : Evolving with changing characteristics  Clinical Decision Making:Medium Complexity   Overall Complexity:MEDIUM

## 2018-07-30 NOTE — PROGRESS NOTES
PT orders received, chart reviewed and PT evaluation attempted. Pt unable to participate with PT due to: 
[]  Nausea/vomiting 
[]  Eating 
[]  Pain 
[]  Pt lethargic [x]  Sleeping. Patient's nurse [facility]) requesting PT allow patient to rest at this time as she was restless and agitated overnight. Will f/u later as patient's schedule allows. Thank you.  
Rom Hardwick PT, DPT

## 2018-07-30 NOTE — PROGRESS NOTES
[facility] - Progress Note Admit Date: 7/26/2018 Assessment:  
 
Hospital Problems  Date Reviewed: 6/23/2018 Codes Class Noted POA HCAP (healthcare-associated pneumonia) ICD-10-CM: J18.9 ICD-9-CM: 507  7/26/2018 Unknown  
   
  
 
 
  
-AMS, CT head negative for acute CVA 
-HCAP, on abx 
-Likely dehydration -Hypokalemia and  Hypomagnesia, being repleted 
-Pleural effusions, R > L 
-Hx of chronic afib, rate controlled, on Eliquis for OAC 
-Diastolic CHF, compensated, Normal LV function by echo June 23, 2018 with EF 50-55%, moderate to severe MR and severe wide open TR.  
-Pulmonary HTN, 2/2 above 
-Parkinson's disease 
-Morbid obesity 
-Deconditioning, SNF resident, at baseline walks a little 
-COPD 
-Full Code 
   
-Follows with [employer] 
 
Plan:  
 
Family discussing goals of care, appreciate hospice involvement. Given worsening renal function, would not aggressively diurese. Family discussing possible thoracentesis. Eliquis on hold, continued on ASA. BP labile, HR40s, decreasing lopressor, continued on amio. Replace electrolytes as needed. Subjective: No CP. No Dizziness. Reports breathing Ok. Objective:  
  
Patient Vitals for the past 8 hrs: 
 Temp Pulse Resp BP SpO2  
07/30/18 1122 97.3 °F (36.3 °C) (!) 48 18 138/71 95 % 07/30/18 0736 97.4 °F (36.3 °C) (!) 45 18 181/82 98 % Patient Vitals for the past 96 hrs: 
 Weight  
07/30/18 0338 94.1 kg (207 lb 8 oz)  
07/29/18 0412 92.9 kg (204 lb 12.8 oz)  
07/28/18 0300 102.2 kg (225 lb 4.8 oz)  
07/27/18 0400 95.2 kg (209 lb 14.4 oz) Intake/Output Summary (Last 24 hours) at 07/30/18 1433 Last data filed at 07/30/18 1027 Gross per 24 hour Intake               30 ml Output                0 ml Net               30 ml Physical Exam: 
General:  alert, cooperative, no distress, appears stated age Neck:  no JVD Lungs:  clear to auscultation bilaterally Heart: irregularly irregular rhythm Abdomen:  abdomen is soft without significant tenderness, masses, organomegaly or guarding Extremities:  extremities normal, atraumatic, no cyanosis or edema Data Review:  
 
Labs: Results:  
   
Chemistry Recent Labs  
   07/30/18 
 0231  07/29/18 
 0912  07/28/18 
 0430 GLU  124*  248*  172* NA  143  141  142  
K  3.2*  3.8  4.1 CL  110*  110*  111* CO2  22  17*  18* BUN  17  16  12 CREA  2.54*  2.54*  1.79* CA  8.0*  7.9*  7.6*  
MG   --    --   2.0 AGAP  11  14  13 BUCR  7*  6*  7* CBC w/Diff Recent Labs  
   07/28/18 
 5149 WBC  6.1  
RBC  3.72* HGB  10.7* HCT  32.7*  
PLT  259 GRANS  75* LYMPH  14* EOS  2 Cardiac Enzymes No results found for: CPK, CK, CKMMB, CKMB, RCK3, CKMBT, CKNDX, CKND1, NAMAN, TROPT, TROIQ, RONA, TROPT, TNIPOC, BNP, BNPP Coagulation No results for input(s): PTP, INR, APTT in the last 72 hours. No lab exists for component: INREXT Lipid Panel Lab Results Component Value Date/Time Cholesterol, total 118 (L) 03/01/2017 09:54 AM  
 HDL Cholesterol 48 03/01/2017 09:54 AM  
 LDL, calculated 54 03/01/2017 09:54 AM  
 Triglyceride 78 03/01/2017 09:54 AM  
 CHOL/HDL Ratio 2.5 03/01/2017 09:54 AM  
  
BNP Lab Results Component Value Date/Time BNP 88.0 04/12/2016 10:30 AM  
  
Liver Enzymes No results for input(s): TP, ALB, TBIL, AP, SGOT, GPT in the last 72 hours. No lab exists for component: DBIL Digoxin Thyroid Studies Lab Results Component Value Date/Time TSH 2.00 07/26/2018 01:50 PM  
    
 
Signed By: KRISTIN Cloud   
 July 30, 2018

## 2018-07-30 NOTE — PROGRESS NOTES
[facility] Pulmonary, Critical Care, and Sleep Medicine Initial Patient Follow Up Name: Tai Chau MRN: [id number] : 1940 Hospital: [address] [place] [address] Dr Date: 2018 IMPRESSION:  
-Acute metabolic encephalopathy- toxic/metabolic superimposed on dementia. -Healthcare-associated pneumonia- started on broad-spectrum antibiotics for now.  
 
-Acute on chronic renal failure- most likely aggravated by diruesis - Creat elevated today 
 
-Chronic diastolic heart failure-Preserved ejection fraction on recent echocardiogram.  Moderate to severe mitral regurgitation, wide-open tricuspid regurgitation. Chronic atrial fibrillation.   
 
-Right greater than left pleural effusions- chronic diastolic heart failure and poor cough with basal atelectasis 
 
-Parkinson's,- under the care of Dr. Tri Jordan. -Morbid obesity. 
 
-Ambulatory dysfunction. She was walking a couple of months ago, but no longer. 
 
-Deep venous thrombosis prophylaxis. -on Eliquis. SHE IS A FULL CODE. RECOMMENDATIONS:  
· Oxygen- titrate to SaO2 goal > 90% · CXR- AP/LAT in am 
· Bronchial hygiene protocol · Bronchodilators prn · Aspiration precautions · Diuresis per primary team 
· Hold Eliquis. - daughters to discuss possible thoracentesis. · Continue antibiotics per primary team. 
· Continue work up for altered mental status · Assess home Oxygen needs at discharge · OT, PT, OOB and ambulate · Other medical management per primary team and respective consultants Subjective: This patient has been seen and evaluated at the request of Dr. Donn Gusman for [facility]. History obtained from chart review- patient not conversant.  
 
Patient is a 66 y.o. female This is a 70-year-old -American female with a past medical history of Parkinson disease, recent admission for CHF at [facility], she has an EF of 51-55%, moderate to severe MR and wide-open TR with pulmonary hypertension. She also has chronic atrial fibrillation, maintained on Eliquis. Her cardiologist is in [place]. During her admission at [place] she was diuresed. She had returned to [facility]. The daughter at the bedside provides most of the history and she states that the patient is not a candidate for rehab because she does not recall the exercises and what she was taught there. She was brought into the ED sent by [employer] for altered mental status. In the ED, she was noted to have a potassium of 2.7 and a magnesium of 1.2. She has bilateral pleural effusions, right greater than left, with likely bilateral infiltrates as well as atelectasis. Her daughter relates that they had appealed her discharge at [facility], but she ultimately had been discharged back to [facility]. She feels that the patient needed more time in the hospital.  There was report of fever from [facility], a scant cough. The patient described pain in her legs and cramping. There was no nausea or vomiting, but there was a report of decreased appetite. No report of foul odor to her urine. She did have some diarrhea during her recent hospital stay. At her baseline, she walks a little. She was walking just a couple of months ago. The daughter also states that at her baseline mental status, she knows her date of birth, but she does not know the day of the week or the year. No further review of systems is obtainable related to the patient's altered mental status. In the ED, she received Zosyn as well as Tylenol, magnesium sulfate 4 grams IV x1, three runs of potassium. She also received a 1-liter bolus of normal saline as well as vancomycin. 07/30/18 
- Patient resting in bed, On room air - More interactive today 
-Persistent pleural effusions on CXR 
- BUN/ Creat elevated today - Remains on Eliquis - Afebrile - WBC normal 
 
Patient has two twin, adult daughters.  I spoke with once daughter today.  More difficult to diurese patient due to increased BUN/Creat. Has persistent effusions but on Eliquis. We discussed possible US- guided thoracentesis. Family discussing possible hospice. Both sisters will discuss possible thoracentesis together. Will hold Eliquis today in case daughters wish to pursue thoracentesis. Pending clinical course will streamline antibiotics tomorrow. Patient Vitals for the past 12 hrs: 
 Temp Pulse Resp BP SpO2  
07/30/18 1122 97.3 °F (36.3 °C) (!) 48 18 138/71 95 % 07/30/18 0736 97.4 °F (36.3 °C) (!) 45 18 181/82 98 % 07/30/18 0338 96.8 °F (36 °C) (!) 47 16 133/57 96 % Past Medical History:  
Diagnosis Date  Anemia NEC  Arthritis  Arthropathy, unspecified, site unspecified  Asthma  Atrial fibrillation ([facility] [address] CHRONIC A-FIB  Diabetes mellitus  Essential hypertension  Heart abnormalities   
 irregular heartbeat  Hypertension  Liver disease   
 steatohepatitis  Neurological disorder   
 dementia  Osteoarthritis LEFT HIP Past Surgical History:  
Procedure Laterality Date  HX BUNIONECTOMY  HX HIP REPLACEMENT  11/2014  HX OTHER SURGICAL  5/27/12  
 colon mass removed  HX TUBAL LIGATION Prior to Admission medications Medication Sig Start Date End Date Taking? Authorizing Provider  
acetaminophen (TYLENOL) 325 mg tablet Take 2 Tabs by mouth every six (6) hours as needed for Pain or Fever. Indications: Arthritic Pain, Fever 6/26/18   Kush Kelley MD  
albuterol (PROVENTIL HFA, VENTOLIN HFA, PROAIR HFA) 90 mcg/actuation inhaler Take 1 Puff by inhalation every six (6) hours as needed (Asthma/cough). Indications: Acute Asthma Attack 6/26/18   Kush Kelley MD  
albuterol (PROVENTIL VENTOLIN) 2.5 mg /3 mL (0.083 %) nebulizer solution 3 mL by Nebulization route every four (4) hours as needed for Wheezing.  Indications: Chronic Obstructive Pulmonary Disease 6/26/18 Wojciech Benitez MD  
amiodarone (CORDARONE) 200 mg tablet Take 1 Tab by mouth daily. Indications: Cardioversion of Atrial Fibrillation 6/26/18   Wojciech Benitez MD  
apixaban (ELIQUIS) 5 mg tablet Take 1 Tab by mouth two (2) times a day. Indications: Cerebral Thromboembolism Prevention 6/26/18   Wojciech Benitez MD  
aspirin delayed-release 81 mg tablet Take 1 Tab by mouth daily. Indications: CAD 6/26/18   Wojciech Benitez MD  
cholecalciferol (VITAMIN D3) 1,000 unit cap Take 2 Caps by mouth daily. Daughter is unsure about the dosage. Indications: Osteoporosis, Vitamin D Deficiency 6/26/18   Wojciech Benitez MD  
famotidine (PEPCID) 20 mg tablet Take 1 Tab by mouth nightly. Indications: gastroesophageal reflux disease 6/26/18   Wojciech Benitez MD  
FLUoxetine (PROZAC) 20 mg tablet Take 2 Tabs by mouth nightly. Indications: ANXIETY WITH DEPRESSION 6/26/18   Wojciech Benitez MD  
furosemide (LASIX) 20 mg tablet Take 1 Tab by mouth daily as needed. Indications: Edema 6/26/18   Wojciech Benitez MD  
glipiZIDE SR (GLUCOTROL XL) 10 mg CR tablet Take 1 Tab by mouth two (2) times a day. 1 in the morning and 1 at bedtime. Indications: type 2 diabetes mellitus 6/26/18   Wojciech Benitez MD  
insulin lispro (HUMALOG) 100 unit/mL injection SubCUTAneous route Before breakfast, lunch, dinner and at bedtime. For Blood Sugar (mg/dL) of:    
Less than 150 =   0 units 150 -199 =   2 units 200 -249 =   4 units 250 -299 =   6 units 300 -349 =   8 units 350 and above =  10 units  Indications: type 2 diabetes mellitus 6/26/18   Wojciech Benitez MD  
losartan (COZAAR) 25 mg tablet Take 1 Tab by mouth daily.  Indications: hypertension 6/26/18   Wojciech Benitez MD  
Menthol-Zinc Oxide (CALMOSEPTINE) 0.44-20.6 % oint Apply 1 g to affected area three (3) times daily as needed (For rash, apply to inner buttocks). Indications: Diaper Rash 6/26/18   Lana Biswas MD  
metFORMIN (GLUCOPHAGE) 1,000 mg tablet Take 1 Tab by mouth two (2) times daily (with meals). Indications: type 2 diabetes mellitus 6/26/18   Lana Biswas MD  
metoprolol tartrate (LOPRESSOR) 25 mg tablet Take 1 Tab by mouth every twelve (12) hours. Indications: VENTRICULAR RATE CONTROL IN ATRIAL FIBRILLATION 6/26/18   Lana Biswas MD  
mometasone [facility] TWISTHALER) 220 mcg (120 doses) aepb inhaler Take 1 Puff by inhalation daily. Powder that patient inhales. 220mcg. Indications: MAINTENANCE THERAPY FOR ASTHMA 6/26/18   Lana Biswas MD  
montelukast (SINGULAIR) 10 mg tablet Take 1 Tab by mouth daily. Indications: MAINTENANCE THERAPY FOR ASTHMA 6/26/18   Lana Biswas MD  
polyethylene glycol (MIRALAX) 17 gram/dose powder Take 17 g by mouth daily as needed. Indications: constipation 6/26/18   Lana Biswas MD  
prednisoLONE acetate (PRED FORTE) 1 % ophthalmic suspension Administer 1 Drop to left eye two (2) times a day. Please verify with  Patient's daughter the percentage. Thanks,   Indications: PREVENTION OF CYTARABINE-INDUCED KERATOCONJUNCTIVITIS 6/26/18   Lana Biswas MD  
QUEtiapine (SEROQUEL) 25 mg tablet Take 0.5 Tabs by mouth two (2) times a day. Indications: Generalized Anxiety Disorder 6/26/18   Lana Biswas MD  
risperiDONE (RISPERDAL) 1 mg tablet Take 1 Tab by mouth nightly. Indications: BIPOLAR DISORDER IN REMISSION, DEPRESSION ASSOCIATED WITH BIPOLAR DISORDER 6/26/18   Lana Biswas MD  
simvastatin (ZOCOR) 10 mg tablet Take 1 Tab by mouth nightly. Indications: hypertriglyceridemia 6/26/18   Lana Biswas MD  
risperiDONE (RISPERDAL) 1 mg tablet Take 1 Tab by mouth nightly. 6/24/18   Carey Winters MD  
 
Allergies Allergen Reactions  Compazine [Prochlorperazine Edisylate] Other (comments) Twitching, seizure-like symptoms Social History Substance Use Topics  Smoking status: Never Smoker  Smokeless tobacco: Never Used  Alcohol use No  
  
Family History Problem Relation Age of Onset  Hypertension Mother  Diabetes Father  Diabetes Daughter Current Facility-Administered Medications Medication Dose Route Frequency  VANCOMYCIN INFORMATION NOTE   Other CONTINUOUS  
 lactulose (CHRONULAC) solution 20 g  20 g Oral BID  insulin lispro (HUMALOG) injection   SubCUTAneous AC&HS  insulin glargine (LANTUS) injection 4 Units  4 Units SubCUTAneous QHS  FLUoxetine (PROzac) capsule 20 mg  20 mg Oral DAILY  QUEtiapine (SEROquel) tablet 12.5 mg  12.5 mg Oral QHS  risperiDONE (RisperDAL) tablet 0.5 mg  0.5 mg Oral QHS  piperacillin-tazobactam (ZOSYN) 3.375 g in 0.9% sodium chloride (MBP/ADV) 100 mL ADV  3.375 g IntraVENous Q6H  
 magnesium oxide (MAG-OX) tablet 400 mg  400 mg Oral DAILY  aspirin delayed-release tablet 81 mg  81 mg Oral DAILY  amiodarone (CORDARONE) tablet 200 mg  200 mg Oral DAILY  cholecalciferol (VITAMIN D3) tablet 2,000 Units  2,000 Units Oral DAILY  famotidine (PEPCID) tablet 20 mg  20 mg Oral QHS  metoprolol tartrate (LOPRESSOR) tablet 25 mg  25 mg Oral Q12H  prednisoLONE acetate (PRED FORTE) 1 % ophthalmic suspension 1 Drop  1 Drop Left Eye BID  simvastatin (ZOCOR) tablet 10 mg  10 mg Oral QHS Review of Systems: 
Review of systems not obtained due to patient factors. Objective:  
Vital Signs:   
Visit Vitals  /71 (BP 1 Location: Left arm, BP Patient Position: At rest)  Pulse (!) 48  Temp 97.3 °F (36.3 °C)  Resp 18  Ht 5' 5\" (1.651 m)  Wt 94.1 kg (207 lb 8 oz)  SpO2 95%  Breastfeeding No  
 BMI 34.53 kg/m2 O2 Device: Nasal cannula O2 Flow Rate (L/min): 2 l/min Temp (24hrs), Av.1 °F (36.2 °C), Min:96.8 °F (36 °C), Max:97.4 °F (36.3 °C) 
  
 
Intake/Output:  
Last shift:      07/30 0701 - 07/30 1900 In: 27 [P.O.:30] Out: - Last 3 shifts: 07/28 1901 - 07/30 0700 In: 900 [P.O.:200; I.V.:700] Out: - Intake/Output Summary (Last 24 hours) at 07/30/18 1355 Last data filed at 07/30/18 1027 Gross per 24 hour Intake               30 ml Output                0 ml Net               30 ml Physical Exam:  
General:  Appears stated age. , more interactive today, request a soda with ice Head:  Normocephalic, without obvious abnormality, atraumatic. Eyes:  Conjunctivae/corneas clear. PERRL, EOMs intact. Nose: Nares normal. Septum midline. Mucosa normal. No drainage or sinus tenderness. Throat: Lips, mucosa, and tongue normal.- No exudates Neck: Supple, symmetrical, trachea midline, no adenopathy, thyroid: no enlargment/tenderness/nodules, no carotid bruit and + JVD. Back:   Symmetric, no curvature. ROM normal.  
Lungs:   Clear to auscultation bilaterally. - diminished at bases, no wheezing or rhonchi Chest wall:  No tenderness or deformity. Heart:  Regular rate and rhythm, S1, S2 normal, + murmur, No click, rub or gallop. Abdomen:   Soft, non-tender. Bowel sounds normal. No masses,  No organomegaly. Extremities: Extremities normal, atraumatic, no cyanosis or edema. Pulses: 2+ and symmetric all extremities. Skin: Skin color, texture, turgor normal. No rashes or lesions Lymph nodes: Cervical, supraclavicular nodes, non-palpable Neurologic: Grossly nonfocal  
 
Data review:  
 
 
 
 
Results for Geno Bullock (MRN [id number]) as of 7/29/2018 10:11 Ref. Range 7/27/2018 05:29 7/28/2018 04:30 7/28/2018 06:37 7/29/2018 04:03 7/29/2018 09:12 BUN Latest Ref Range: 7.0 - 18 MG/DL 8 12   16 Creatinine Latest Ref Range: 0.6 - 1.3 MG/DL 1.16 1.79 (H)   2.54 (H) Results for Geno Bullock (MRN [id number]) as of 7/29/2018 10:11 Ref.  Range 6/25/2018 02:46 6/26/2018 03:18 7/26/2018 13:50 7/27/2018 05:29 7/28/2018 06:37 WBC Latest Ref Range: 4.6 - 13.2 K/uL 6.6 5.9 8.2 5.3 6.1 Lab Results Component Value Date/Time BNP 88.0 04/12/2016 10:30 AM  
 NT pro-BNP 5451 (H) 07/29/2018 09:12 AM  
 NT pro-BNP 6620 (H) 07/26/2018 01:50 PM  
 NT pro- 08/08/2017 06:45 PM  
 NT pro-BNP 1646.8 (H) 02/28/2017 05:33 PM  
 
 
 
Recent Results (from the past 24 hour(s)) GLUCOSE, POC Collection Time: 07/29/18  4:53 PM  
Result Value Ref Range Glucose (POC) 162 (H) 70 - 110 mg/dL GLUCOSE, POC Collection Time: 07/29/18  8:56 PM  
Result Value Ref Range Glucose (POC) 123 (H) 70 - 110 mg/dL Selestine Nadja Collection Time: 07/30/18  2:31 AM  
Result Value Ref Range Vancomycin, random 15.8 5.0 - 85.0 UG/ML  
METABOLIC PANEL, BASIC Collection Time: 07/30/18  2:31 AM  
Result Value Ref Range Sodium 143 136 - 145 mmol/L Potassium 3.2 (L) 3.5 - 5.5 mmol/L Chloride 110 (H) 100 - 108 mmol/L  
 CO2 22 21 - 32 mmol/L Anion gap 11 3.0 - 18 mmol/L Glucose 124 (H) 74 - 99 mg/dL BUN 17 7.0 - 18 MG/DL Creatinine 2.54 (H) 0.6 - 1.3 MG/DL  
 BUN/Creatinine ratio 7 (L) 12 - 20 GFR est AA 22 (L) >60 ml/min/1.73m2 GFR est non-AA 18 (L) >60 ml/min/1.73m2 Calcium 8.0 (L) 8.5 - 10.1 MG/DL  
GLUCOSE, POC Collection Time: 07/30/18  7:39 AM  
Result Value Ref Range Glucose (POC) 133 (H) 70 - 110 mg/dL GLUCOSE, POC Collection Time: 07/30/18 11:20 AM  
Result Value Ref Range Glucose (POC) 167 (H) 70 - 110 mg/dL Imaging: 
I have personally reviewed the patients radiographs and have reviewed the reports: CXR Results  (Last 48 hours) None 7/28/18 CXR- PA/LAT EXAM: Chest Radiographs 
  
INDICATION: Pleural effusion 
  
TECHNIQUE: PA and lateral views of the chest 
  
COMPARISON: 7/26/2018 
  
FINDINGS: No pneumothorax identified. Moderate to large bilateral pleural 
effusions right greater than left are noted.  Bibasilar opacities which may 
resent atelectasis or infiltrate. Cardiomegaly is present. The pulmonary 
vascularity is unremarkable. The osseous structures are unremarkable. 
  
IMPRESSION IMPRESSION: 
1. Moderate to large bilateral pleural effusions right greater than left. CT Results  (Last 48 hours) None PFT Results  (Last 48 hours) None Echo Results  (Last 48 hours) None High complexity decision making was performed during the evaluation of this patient at high risk for decompensation with multiple organ involvement 
  
Above mentioned total time spent on reviewing the case/medical record/data/notes/EMR/patient examination/documentation/coordinating care with nurse/consultants, exclusive of procedures with complex decision making performed and > 50% time spent in face to face evaluation. Nava Morgan DO, [facility], Sleep, Critical Care Medicine Clinical care, chart review and time spent coordinating care: 30 min

## 2018-07-30 NOTE — PROGRESS NOTES
Patient BP was elevated as reported by CNA, patient is asymptomatic, BP med was given as ordered, to recheck V/S and inform physician accordingly.

## 2018-07-30 NOTE — HOSPICE
Thank you for the opportunity to care for and support this patient and family. Any questions or concerns please feel free to contact  hospice at [phone]. [phone] Pt in bed with eyes closed, awakened briefly when her name was called but quickly closed her eyes. No family present in the room. Telephone call to the patient's daughter Alfornia Sacks to explain hospice services. Explained FOC, IDT and philosophy and criteria. Offered to leave a brochure for Alfornia Sacks in the patient's room so that she can review. She stated that the goal is for the patient to return to [facility] upon discharge. Discharge date unknown.

## 2018-07-31 NOTE — ROUTINE PROCESS
Bedside and Verbal shift change report given to Billie Reed (oncoming nurse) by Noel Martinez RN 
 (offgoing nurse). Report included the following information SBAR, Kardex, Intake/Output and MAR.

## 2018-07-31 NOTE — PROGRESS NOTES
Problem: Falls - Risk of 
Goal: *Absence of Falls Document Leyda Stein Fall Risk and appropriate interventions in the flowsheet. Outcome: Progressing Towards Goal 
Fall Risk Interventions: 
Mobility Interventions: Bed/chair exit alarm, OT consult for ADLs, Patient to call before getting OOB, PT Consult for mobility concerns Mentation Interventions: Bed/chair exit alarm, Door open when patient unattended Medication Interventions: Bed/chair exit alarm, Patient to call before getting OOB, Teach patient to arise slowly Elimination Interventions: Bed/chair exit alarm, Call light in reach History of Falls Interventions: Evaluate medications/consider consulting pharmacy Problem: Pressure Injury - Risk of 
Goal: *Prevention of pressure injury Document Julián Scale and appropriate interventions in the flowsheet. Outcome: Progressing Towards Goal 
Pressure Injury Interventions: 
Sensory Interventions: Float heels, Keep linens dry and wrinkle-free Moisture Interventions: Apply protective barrier, creams and emollients, Internal/External urinary devices Activity Interventions: Increase time out of bed, PT/OT evaluation Mobility Interventions: Float heels, PT/OT evaluation Nutrition Interventions: Document food/fluid/supplement intake, Offer support with meals,snacks and hydration Friction and Shear Interventions: Minimize layers, Transferring/repositioning devices

## 2018-07-31 NOTE — PROGRESS NOTES
Problem: Mobility Impaired (Adult and Pediatric) Goal: *Acute Goals and Plan of Care (Insert Text) Physical Therapy Goals Initiated 7/30/2018 and to be accomplished within 7 day(s) 1. Patient will move from supine to sit and sit to supine  and roll side to side in bed with minimal assistance/contact guard assist.    
2.  Patient will transfer from bed to chair and chair to bed with minimal assistance/contact guard assist using the least restrictive device. 3.  Patient will perform sit to stand with minimal assistance/contact guard assist. 
4.  Patient will ambulate with minimal assistance/contact guard assist for >5 feet with the least restrictive device. physical Therapy TREATMENT Patient: Richard Cool (18 y.o. female) Date: 7/31/2018 Diagnosis: HCAP (healthcare-associated pneumonia) <principal problem not specified> Precautions: Fall, Skin Chart, physical therapy assessment, plan of care and goals were reviewed. ASSESSMENT: 
Patient presents today alert and agreeable to therapy and was supine in bed upon arrival. Patient is oriented to self this session with confusion and required increased verbal and tactile cues for appropriate mobility techniques. Patient transferred to EOB with MaxA and additional time. Once sitting EOB, required assist to scoot and required cues for weight shifting laterally for scooting. Patient transferred to standing with RW and MaxA with additional time. Required tactile cue at sternum and sacrum and cues to tuck bottom underneath Hill. Once standing, STACEY Parisi assisting with bed linen change; patient experienced loose watery stool in standing and was requesting to sit back onto bed. Patient required 2nd standing attempt with same assist and cues for 2nd change of linens; appreciated assist from CNA for africa-care and linens as therapist instructing patient on deep breathing and cues to remain standing.  Able to stand apprx 1-2mins each stand and demonstrates crouched posture. Patient then transferred back to sitting and assisted to supine with MaxA. Once supine, HOB in trendelenburg and 2 person assist towards HOB. Patient was left resting with prevlon boots donned and CNA in room to complete care;handoff performed at this time. Progression toward goals: 
[x]      Improving appropriately and progressing toward goals 
[]      Improving slowly and progressing toward goals 
[]      Not making progress toward goals and plan of care will be adjusted PLAN: 
Patient continues to benefit from skilled intervention to address the above impairments. Continue treatment per established plan of care. Discharge Recommendations:  Mykel Tariq Further Equipment Recommendations for Discharge:  N/A  
 
G-CODES:  
 
Mobility  Current  CL= 60-79%   Goal  CJ= 20-39%. The severity rating is based on the Level of Assistance required for Functional Mobility and ADLs. SUBJECTIVE:  
Patient stated I don't really know. \" when asked where she was. Oriented to self only OBJECTIVE DATA SUMMARY:  
Critical Behavior: 
Neurologic State: Alert, Confused Orientation Level: Disoriented to place, Disoriented to situation, Disoriented to time, Oriented to person Cognition: Follows commands Safety/Judgement: Fall prevention Functional Mobility Training: 
Bed Mobility: 
 Supine to Sit: Maximum assistance Sit to Supine: Maximum assistance Scooting: Total assistance (HOB trendelenberg, patietn supine and w 2 person assist) Transfers: 
Sit to Stand: Maximum assistance; Additional time (x2 trasnfers) Stand to Sit: Maximum assistance Stood apprx 1min each trial 
    Balance: 
Sitting: Impaired; With support Sitting - Static: Fair (occasional) Sitting - Dynamic: Fair (occasional) Standing: Impaired; With support Standing - Static: Poor Standing - Dynamic : Poor Pain: 
Pt reports 0/10 pain or discomfort prior to treatment.   
Pt reports 0/10 pain or discomfort post treatment. Activity Tolerance:  
Patient tolerated activity fair; remains confused and demos decreased endurance. Please refer to the flowsheet for vital signs taken during this treatment. After treatment:  
[] Patient left in no apparent distress sitting up in chair 
[x] Patient left in no apparent distress in bed 
[x] Call bell left within reach [x] Nursing notified 
[] Caregiver present 
[] Bed alarm activated Carla Coughlin, PT Time Calculation: 26 mins

## 2018-07-31 NOTE — HOSPICE
Hospice follow up: Telephone call to the patient's daughter Shona Ramos as a follow up. She stated she would like hospice services for her mother upon discharge. She has BS hospice contact information for future reference.

## 2018-07-31 NOTE — PROGRESS NOTES
SLP NOTE - 
  
4062: SLP attempting f/u dysphagia management tx. However, pt currently participating in PT. SLP will reattempt as pt schedule allows.   
  
Thank you for this referral. 
  
Jose A Ray M.S., CCC-SLP Speech-Language Pathologist

## 2018-07-31 NOTE — PROGRESS NOTES
[facility] Pulmonary, Critical Care, and Sleep Medicine Initial Patient Follow Up Name: Damir Garcia MRN: [id number] : 1940 Hospital: [address] [place] [address]  Date: 2018 IMPRESSION:  
-Acute metabolic encephalopathy- toxic/metabolic superimposed on dementia. -Healthcare-associated pneumonia- started on broad-spectrum antibiotics for now.  
 
-Acute on chronic renal failure- most likely aggravated by diruesis - BUN/Creat:.18 
 
-Chronic diastolic heart failure-Preserved ejection fraction on recent echocardiogram.  Moderate to severe mitral regurgitation, wide-open tricuspid regurgitation. Chronic atrial fibrillation.   
 
-Right greater than left pleural effusions- chronic diastolic heart failure and poor cough with basal atelectasis 
 
-Parkinson's,- under the care of Dr. Lisa Rodriguez. -Morbid obesity. 
 
-Ambulatory dysfunction. She was walking a couple of months ago, but no longer. 
 
-Deep venous thrombosis prophylaxis. -on Eliquis. SHE IS A FULL CODE. RECOMMENDATIONS:  
· Oxygen- titrate to SaO2 goal > 90% · CXR- AP/LAT in am 
· Bronchial hygiene protocol · Bronchodilators prn · Aspiration precautions · Diuresis per primary team 
· Continue to hold Eliquis. - plan for thoracentesis tomorrow 18- US/IR 
· Continue antibiotics per primary team.- Consider changing to doxycycline to complete today 5 day course · Continue work up for altered mental status · Assess home Oxygen needs at discharge · OT, PT, OOB and ambulate · Other medical management per primary team and respective consultants Subjective: This patient has been seen and evaluated at the request of Dr. Nadja Bazzi for HAP. History obtained from chart review- patient not conversant.  
 
Patient is a 66 y.o. female This is a 78-year-old -American female with a past medical history of Parkinson disease, recent admission for CHF at [facility], she has an EF of 51-55%, moderate to severe MR and wide-open TR with pulmonary hypertension. She also has chronic atrial fibrillation, maintained on Eliquis. Her cardiologist is in [place]. During her admission at [facility] she was diuresed. She had returned to [facility]. The daughter at the bedside provides most of the history and she states that the patient is not a candidate for rehab because she does not recall the exercises and what she was taught there. She was brought into the ED sent by [facility] [employer] for altered mental status. In the ED, she was noted to have a potassium of 2.7 and a magnesium of 1.2. She has bilateral pleural effusions, right greater than left, with likely bilateral infiltrates as well as atelectasis. Her daughter relates that they had appealed her discharge at [facility], but she ultimately had been discharged back to [facility]. She feels that the patient needed more time in the hospital.  There was report of fever from [employer], a scant cough. The patient described pain in her legs and cramping. There was no nausea or vomiting, but there was a report of decreased appetite. No report of foul odor to her urine. She did have some diarrhea during her recent hospital stay. At her baseline, she walks a little. She was walking just a couple of months ago. The daughter also states that at her baseline mental status, she knows her date of birth, but she does not know the day of the week or the year. No further review of systems is obtainable related to the patient's altered mental status. In the ED, she received Zosyn as well as Tylenol, magnesium sulfate 4 grams IV x1, three runs of potassium. She also received a 1-liter bolus of normal saline as well as vancomycin. 7/30/18- Patient has two twin, adult daughters. I spoke with once daughter today. More difficult to diurese patient due to increased BUN/Creat. Has persistent effusions but on Eliquis.   We discussed possible US- guided thoracentesis. Family discussing possible hospice. Both sisters will discuss possible thoracentesis together. Will hold Eliquis today in case daughters wish to pursue thoracentesis. Pending clinical course will streamline antibiotics tomorrow. 07/31/18 
-Patient resting in bed, On room air, confused but pleasant at time of my exam.  
-Persistent pleural effusions on CXR 
- Afebrile - WBC normal 
 
 
 
 
Patient Vitals for the past 12 hrs: 
 Temp Pulse Resp BP SpO2  
07/31/18 1021 97.5 °F (36.4 °C) (!) 54 18 148/74 93 % 07/31/18 0738 97.4 °F (36.3 °C) (!) 52 18 147/65 95 % 07/31/18 0436 98.7 °F (37.1 °C) 74 20 124/87 94 % Past Medical History:  
Diagnosis Date  Anemia NEC  Arthritis  Arthropathy, unspecified, site unspecified  Asthma  Atrial fibrillation ([facility] [address] CHRONIC A-FIB  Diabetes mellitus  Essential hypertension  Heart abnormalities   
 irregular heartbeat  Hypertension  Liver disease   
 steatohepatitis  Neurological disorder   
 dementia  Osteoarthritis LEFT HIP Past Surgical History:  
Procedure Laterality Date  HX BUNIONECTOMY  HX HIP REPLACEMENT  11/2014  HX OTHER SURGICAL  5/27/12  
 colon mass removed  HX TUBAL LIGATION Prior to Admission medications Medication Sig Start Date End Date Taking? Authorizing Provider  
acetaminophen (TYLENOL) 325 mg tablet Take 2 Tabs by mouth every six (6) hours as needed for Pain or Fever. Indications: Arthritic Pain, Fever 6/26/18   Megan Kong MD  
albuterol (PROVENTIL HFA, VENTOLIN HFA, PROAIR HFA) 90 mcg/actuation inhaler Take 1 Puff by inhalation every six (6) hours as needed (Asthma/cough). Indications: Acute Asthma Attack 6/26/18   Megan Kong MD  
albuterol (PROVENTIL VENTOLIN) 2.5 mg /3 mL (0.083 %) nebulizer solution 3 mL by Nebulization route every four (4) hours as needed for Wheezing.  Indications: Chronic Obstructive Pulmonary Disease 6/26/18   Domi Schwartz MD  
amiodarone (CORDARONE) 200 mg tablet Take 1 Tab by mouth daily. Indications: Cardioversion of Atrial Fibrillation 6/26/18   Domi Schwartz MD  
apixaban (ELIQUIS) 5 mg tablet Take 1 Tab by mouth two (2) times a day. Indications: Cerebral Thromboembolism Prevention 6/26/18   Domi Schwartz MD  
aspirin delayed-release 81 mg tablet Take 1 Tab by mouth daily. Indications: CAD 6/26/18   Domi Schwartz MD  
cholecalciferol (VITAMIN D3) 1,000 unit cap Take 2 Caps by mouth daily. Daughter is unsure about the dosage. Indications: Osteoporosis, Vitamin D Deficiency 6/26/18   Domi Schwartz MD  
famotidine (PEPCID) 20 mg tablet Take 1 Tab by mouth nightly. Indications: gastroesophageal reflux disease 6/26/18   Domi Schwartz MD  
FLUoxetine (PROZAC) 20 mg tablet Take 2 Tabs by mouth nightly. Indications: ANXIETY WITH DEPRESSION 6/26/18   Domi Schwartz MD  
furosemide (LASIX) 20 mg tablet Take 1 Tab by mouth daily as needed. Indications: Edema 6/26/18   Domi Schwartz MD  
glipiZIDE SR (GLUCOTROL XL) 10 mg CR tablet Take 1 Tab by mouth two (2) times a day. 1 in the morning and 1 at bedtime. Indications: type 2 diabetes mellitus 6/26/18   Domi Schwartz MD  
insulin lispro (HUMALOG) 100 unit/mL injection SubCUTAneous route Before breakfast, lunch, dinner and at bedtime. For Blood Sugar (mg/dL) of:    
Less than 150 =   0 units 150 -199 =   2 units 200 -249 =   4 units 250 -299 =   6 units 300 -349 =   8 units 350 and above =  10 units  Indications: type 2 diabetes mellitus 6/26/18   Domi Schwartz MD  
losartan (COZAAR) 25 mg tablet Take 1 Tab by mouth daily.  Indications: hypertension 6/26/18   Domi Schwartz MD  
Menthol-Zinc Oxide (CALMOSEPTINE) 0.44-20.6 % oint Apply 1 g to affected area three (3) times daily as needed (For rash, apply to inner buttocks). Indications: Diaper Rash 6/26/18   Katlin Rodriges MD  
metFORMIN (GLUCOPHAGE) 1,000 mg tablet Take 1 Tab by mouth two (2) times daily (with meals). Indications: type 2 diabetes mellitus 6/26/18   Katlin Rodriges MD  
metoprolol tartrate (LOPRESSOR) 25 mg tablet Take 1 Tab by mouth every twelve (12) hours. Indications: VENTRICULAR RATE CONTROL IN ATRIAL FIBRILLATION 6/26/18   Katlin Rodriges MD  
[facility] TWISTHALER) 220 mcg (120 doses) aepb inhaler Take 1 Puff by inhalation daily. Powder that patient inhales. 220mcg. Indications: MAINTENANCE THERAPY FOR ASTHMA 6/26/18   Katlin Rodriges MD  
montelukast (SINGULAIR) 10 mg tablet Take 1 Tab by mouth daily. Indications: MAINTENANCE THERAPY FOR ASTHMA 6/26/18   Katlin Rodriges MD  
polyethylene glycol (MIRALAX) 17 gram/dose powder Take 17 g by mouth daily as needed. Indications: constipation 6/26/18   Katlin Rodriges MD  
prednisoLONE acetate (PRED FORTE) 1 % ophthalmic suspension Administer 1 Drop to left eye two (2) times a day. Please verify with  Patient's daughter the percentage. Thanks,   Indications: PREVENTION OF CYTARABINE-INDUCED KERATOCONJUNCTIVITIS 6/26/18   Katlin Rodriges MD  
QUEtiapine (SEROQUEL) 25 mg tablet Take 0.5 Tabs by mouth two (2) times a day. Indications: Generalized Anxiety Disorder 6/26/18   Katlin Rodriges MD  
risperiDONE (RISPERDAL) 1 mg tablet Take 1 Tab by mouth nightly. Indications: BIPOLAR DISORDER IN REMISSION, DEPRESSION ASSOCIATED WITH BIPOLAR DISORDER 6/26/18   Katlin Rodriges MD  
simvastatin (ZOCOR) 10 mg tablet Take 1 Tab by mouth nightly. Indications: hypertriglyceridemia 6/26/18   Katlin Rodriges MD  
risperiDONE (RISPERDAL) 1 mg tablet Take 1 Tab by mouth nightly. 6/24/18   Rachelle Pretty MD  
 
Allergies Allergen Reactions  Compazine [Prochlorperazine Edisylate] Other (comments) Twitching, seizure-like symptoms Social History Substance Use Topics  Smoking status: Never Smoker  Smokeless tobacco: Never Used  Alcohol use No  
  
Family History Problem Relation Age of Onset  Hypertension Mother  Diabetes Father  Diabetes Daughter Current Facility-Administered Medications Medication Dose Route Frequency  potassium chloride (K-DUR, KLOR-CON) SR tablet 40 mEq  40 mEq Oral TID  vancomycin (VANCOCIN) 1,500 mg in 0.9% sodium chloride 500 mL IVPB  1,500 mg IntraVENous ONCE  
 metoprolol tartrate (LOPRESSOR) tablet 12.5 mg  12.5 mg Oral Q12H  VANCOMYCIN INFORMATION NOTE   Other CONTINUOUS  
 lactulose (CHRONULAC) solution 20 g  20 g Oral BID  insulin lispro (HUMALOG) injection   SubCUTAneous AC&HS  insulin glargine (LANTUS) injection 4 Units  4 Units SubCUTAneous QHS  FLUoxetine (PROzac) capsule 20 mg  20 mg Oral DAILY  QUEtiapine (SEROquel) tablet 12.5 mg  12.5 mg Oral QHS  risperiDONE (RisperDAL) tablet 0.5 mg  0.5 mg Oral QHS  piperacillin-tazobactam (ZOSYN) 3.375 g in 0.9% sodium chloride (MBP/ADV) 100 mL ADV  3.375 g IntraVENous Q6H  
 magnesium oxide (MAG-OX) tablet 400 mg  400 mg Oral DAILY  aspirin delayed-release tablet 81 mg  81 mg Oral DAILY  amiodarone (CORDARONE) tablet 200 mg  200 mg Oral DAILY  cholecalciferol (VITAMIN D3) tablet 2,000 Units  2,000 Units Oral DAILY  famotidine (PEPCID) tablet 20 mg  20 mg Oral QHS  prednisoLONE acetate (PRED FORTE) 1 % ophthalmic suspension 1 Drop  1 Drop Left Eye BID  simvastatin (ZOCOR) tablet 10 mg  10 mg Oral QHS Review of Systems: 
Review of systems not obtained due to patient factors. Objective:  
Vital Signs:   
Visit Vitals  /74 (BP 1 Location: Right arm, BP Patient Position: At rest)  Pulse (!) 54  Temp 97.5 °F (36.4 °C)  Resp 18  Ht 5' 5\" (1.651 m)  Wt 95.4 kg (210 lb 4.8 oz)  SpO2 93%  Breastfeeding No  
 BMI 35 kg/m2 O2 Device: Room air O2 Flow Rate (L/min): 2 l/min Temp (24hrs), Av.6 °F (36.4 °C), Min:97.2 °F (36.2 °C), Max:98.7 °F (37.1 °C) Intake/Output:  
Last shift:        
Last 3 shifts:  1901 -  0700 In: 27 [P.O.:30] Out: - No intake or output data in the 24 hours ending 18 1409 Physical Exam:  
General:  Appears stated age, resting comfortable in bed Head:  Normocephalic, without obvious abnormality, atraumatic. Eyes:  Conjunctivae/corneas clear. PERRL, EOMs intact. Nose: Nares normal. Septum midline. Mucosa normal. No drainage or sinus tenderness. Throat: Lips, mucosa, and tongue normal.- No exudates Neck: Supple, symmetrical, trachea midline, no adenopathy, thyroid: no enlargment/tenderness/nodules, no carotid bruit. Back:   Symmetric, no curvature. ROM normal.  
Lungs:   Clear to auscultation bilaterally. - diminished at bases, no wheezing or rhonchi- no accessory muscle use Chest wall:  No tenderness or deformity. Heart:  Regular rate and rhythm, S1, S2 normal, + murmur, No click, rub or gallop. Abdomen:   Soft, non-tender. Bowel sounds normal. No masses,  No organomegaly. Extremities: Extremities normal, atraumatic, no cyanosis or edema. Pulses: 2+ and symmetric all extremities. Skin: Skin color, texture, turgor normal. No rashes or lesions Lymph nodes: Cervical, supraclavicular nodes, non-palpable Neurologic: Grossly nonfocal  
 
Data review:  
 
 
 
 
Results for Rhett Hodges (MRN [id number]) as of 2018 10:11 Ref. Range 2018 05:29 2018 04:30 2018 06:37 2018 04:03 2018 09:12 BUN Latest Ref Range: 7.0 - 18 MG/DL 8 12   16 Creatinine Latest Ref Range: 0.6 - 1.3 MG/DL 1.16 1.79 (H)   2.54 (H) Results for Rhett Hodges (MRN [id number]) as of 2018 10:11 Ref.  Range 2018 02:46 2018 03:18 2018 13:50 2018 05:29 7/28/2018 06:37 WBC Latest Ref Range: 4.6 - 13.2 K/uL 6.6 5.9 8.2 5.3 6.1 Lab Results Component Value Date/Time BNP 88.0 04/12/2016 10:30 AM  
 NT pro-BNP 5451 (H) 07/29/2018 09:12 AM  
 NT pro-BNP 6620 (H) 07/26/2018 01:50 PM  
 NT pro- 08/08/2017 06:45 PM  
 NT pro-BNP 1646.8 (H) 02/28/2017 05:33 PM  
 
 
 
Recent Results (from the past 24 hour(s)) GLUCOSE, POC Collection Time: 07/30/18  4:01 PM  
Result Value Ref Range Glucose (POC) 168 (H) 70 - 110 mg/dL GLUCOSE, POC Collection Time: 07/30/18 11:02 PM  
Result Value Ref Range Glucose (POC) 233 (H) 70 - 110 mg/dL METABOLIC PANEL, BASIC Collection Time: 07/31/18  2:35 AM  
Result Value Ref Range Sodium 142 136 - 145 mmol/L Potassium 2.9 (LL) 3.5 - 5.5 mmol/L Chloride 111 (H) 100 - 108 mmol/L  
 CO2 20 (L) 21 - 32 mmol/L Anion gap 11 3.0 - 18 mmol/L Glucose 158 (H) 74 - 99 mg/dL BUN 19 (H) 7.0 - 18 MG/DL Creatinine 2.18 (H) 0.6 - 1.3 MG/DL  
 BUN/Creatinine ratio 9 (L) 12 - 20 GFR est AA 26 (L) >60 ml/min/1.73m2 GFR est non-AA 22 (L) >60 ml/min/1.73m2 Calcium 8.1 (L) 8.5 - 10.1 MG/DL Cintia Docker Collection Time: 07/31/18  2:35 AM  
Result Value Ref Range Vancomycin, random 12.1 5.0 - 40.0 UG/ML  
CBC W/O DIFF Collection Time: 07/31/18  2:35 AM  
Result Value Ref Range WBC 7.6 4.6 - 13.2 K/uL  
 RBC 3.66 (L) 4.20 - 5.30 M/uL  
 HGB 10.5 (L) 12.0 - 16.0 g/dL HCT 32.9 (L) 35.0 - 45.0 % MCV 89.9 74.0 - 97.0 FL  
 MCH 28.7 24.0 - 34.0 PG  
 MCHC 31.9 31.0 - 37.0 g/dL  
 RDW 14.7 (H) 11.6 - 14.5 % PLATELET 971 377 - 430 K/uL MPV 10.7 9.2 - 11.8 FL  
GLUCOSE, POC Collection Time: 07/31/18  7:35 AM  
Result Value Ref Range Glucose (POC) 167 (H) 70 - 110 mg/dL GLUCOSE, POC Collection Time: 07/31/18 10:53 AM  
Result Value Ref Range Glucose (POC) 186 (H) 70 - 110 mg/dL Imaging: 
I have personally reviewed the patients radiographs and have reviewed the reports: CXR Results  (Last 48 hours) None 7/28/18 CXR- PA/LAT EXAM: Chest Radiographs 
  
INDICATION: Pleural effusion 
  
TECHNIQUE: PA and lateral views of the chest 
  
COMPARISON: 7/26/2018 
  
FINDINGS: No pneumothorax identified. Moderate to large bilateral pleural 
effusions right greater than left are noted. Bibasilar opacities which may 
resent atelectasis or infiltrate. Cardiomegaly is present. The pulmonary 
vascularity is unremarkable. The osseous structures are unremarkable. 
  
IMPRESSION IMPRESSION: 
1. Moderate to large bilateral pleural effusions right greater than left. CT Results  (Last 48 hours) None PFT Results  (Last 48 hours) None Echo Results  (Last 48 hours) None High complexity decision making was performed during the evaluation of this patient at high risk for decompensation with multiple organ involvement 
  
Above mentioned total time spent on reviewing the case/medical record/data/notes/EMR/patient examination/documentation/coordinating care with nurse/consultants, exclusive of procedures with complex decision making performed and > 50% time spent in face to face evaluation. Nava Morgan DO, [facility], Sleep, Critical Care Medicine Clinical care, chart review and time spent coordinating care: 25 min

## 2018-07-31 NOTE — PROGRESS NOTES
Care Management Interventions PCP Verified by CM: Yes Physical Therapy Consult: Yes Occupational Therapy Consult: Yes Speech Therapy Consult: Yes Current Support Network: Assisted Living Confirm Follow Up Transport: Family Plan discussed with Pt/Family/Caregiver: Yes Discharge Location Discharge Placement: Assisted Living Physician informs that pt's daughter was considering SNF and hospice care. CM noted that hospice nurse communicated with pt's daughter who wishes to proceed with hospice. CM contacted hospice nurse who shares that pt is borderline and hospice will follow to assist and if pt progresses, they could discharge her from their care. CM contacted pt's daughter Mary Kate Henriquez who wishes for hospice care to assist pt when she returns to [employer].

## 2018-07-31 NOTE — PROGRESS NOTES
[facility] Hospitalist Group Progress Note Patient: Frank Andrea Age: 66 y.o. : 1940 MR#: [id number] SSN: [id number] Date/Time: 2018 Subjective:  
 
Pt more alert and awake in nad, states her breathing has improved. Assessment/Plan:  
-Acute metabolic encephalopathy (cc on presentation)- likely a combination of factors. Sounds like pt has underlying dementia, superimposed delirium likely especially given multiple hospitalizations in the recent past and underlying cardiopulmonary issues as detailed below. Appears to have improved today. -HCAP on vanc and zosyn, pulmonology following, discussed plan of care w// pulmonologist, plans to de escalate abx in the near future. 
-Chronic diastolic HF compensated cardiology following, agree w/ holding diuretics in light of elevated creat. 
-Acute on chronic renal failure cause unclear ?pre renal due to dehydration, diuretic use pta? Cozaar also one of her dc meds from recent hospitalization. Creat with some improvement today. -Right greater than left pleural effusion-pulmonary following. Possible thoracentesis w/ help of IR once off oac for appropriate time. 
-Impaired mobility-? Due to de conditioning, PT/OT consulted 
-History of moderate to severe MR and wide open TR . 
-history of chronic afib on eliquis, to be held in preparation for thoracentesis. -history of Parkinson's disease 
-history of pulm HTN, COPD-stable 
-recent admission to a [facility] for management of acute on chronic combined systolic and diastolic HF. PLAN: 
-cont abx, bronchial hygiene, aspiration precautions, PRN BD. 
-cont to hold diuresis 
-cont to follow creat further w/u if cont'd elevation despite holding lasix and cozaar. -IR consult for thoracentesis 
- hold eliquis for now -PT/OT ordered DVT prophyx, eliquis held, last dose 0730 am, will start heparin dvt prophyx tomorrow while eliquis is on hold Disposition: daughter interested in Hospice assistance. Hospice consulted. Anticipate dc in 48 hrs after thoracentesis and course of IV abx for rx of HCAP. PT recommends SNF. Discussed w/ CRISTOBAL JAIN to contact pt's daughter to confirm if daughter wants home with  hospice vs SNF placement. Additional Notes:   
 
Case discussed with:  [x]Patient  []Family  [x]Nursing  []Case Management DVT Prophylaxis:  []Lovenox  []Hep SQ  []SCDs  []Coumadin   []On Heparin gtt Objective:  
VS:  
Visit Vitals  /74 (BP 1 Location: Right arm, BP Patient Position: At rest)  Pulse (!) 54  Temp 97.5 °F (36.4 °C)  Resp 18  Ht 5' 5\" (1.651 m)  Wt 95.4 kg (210 lb 4.8 oz)  SpO2 93%  Breastfeeding No  
 BMI 35 kg/m2 Tmax/24hrs: Temp (24hrs), Av.6 °F (36.4 °C), Min:97.2 °F (36.2 °C), Max:98.7 °F (37.1 °C) Input/Output:  
No intake or output data in the 24 hours ending 18 1302 General: somnolent wakes up easily to verbal stimulus in nad Cardiovascular: rrr, no murmurs Pulmonary: ctab GI:  Soft, nt, nd 
Extremities:  No edema Additional:   
 
Labs:   
Recent Results (from the past 24 hour(s)) GLUCOSE, POC Collection Time: 18  4:01 PM  
Result Value Ref Range Glucose (POC) 168 (H) 70 - 110 mg/dL GLUCOSE, POC Collection Time: 18 11:02 PM  
Result Value Ref Range Glucose (POC) 233 (H) 70 - 110 mg/dL METABOLIC PANEL, BASIC Collection Time: 18  2:35 AM  
Result Value Ref Range Sodium 142 136 - 145 mmol/L Potassium 2.9 (LL) 3.5 - 5.5 mmol/L Chloride 111 (H) 100 - 108 mmol/L  
 CO2 20 (L) 21 - 32 mmol/L Anion gap 11 3.0 - 18 mmol/L Glucose 158 (H) 74 - 99 mg/dL BUN 19 (H) 7.0 - 18 MG/DL Creatinine 2.18 (H) 0.6 - 1.3 MG/DL  
 BUN/Creatinine ratio 9 (L) 12 - 20 GFR est AA 26 (L) >60 ml/min/1.73m2 GFR est non-AA 22 (L) >60 ml/min/1.73m2 Calcium 8.1 (L) 8.5 - 10.1 MG/DL Juan Manuel Peña  Collection Time: 18  2:35 AM  
Result Value Ref Range Vancomycin, random 12.1 5.0 - 40.0 UG/ML  
CBC W/O DIFF Collection Time: 07/31/18  2:35 AM  
Result Value Ref Range WBC 7.6 4.6 - 13.2 K/uL  
 RBC 3.66 (L) 4.20 - 5.30 M/uL  
 HGB 10.5 (L) 12.0 - 16.0 g/dL HCT 32.9 (L) 35.0 - 45.0 % MCV 89.9 74.0 - 97.0 FL  
 MCH 28.7 24.0 - 34.0 PG  
 MCHC 31.9 31.0 - 37.0 g/dL  
 RDW 14.7 (H) 11.6 - 14.5 % PLATELET 781 429 - 758 K/uL MPV 10.7 9.2 - 11.8 FL  
GLUCOSE, POC Collection Time: 07/31/18  7:35 AM  
Result Value Ref Range Glucose (POC) 167 (H) 70 - 110 mg/dL GLUCOSE, POC Collection Time: 07/31/18 10:53 AM  
Result Value Ref Range Glucose (POC) 186 (H) 70 - 110 mg/dL Additional Data Reviewed:   
 
Signed By: Ryan Hunt MD   
 July 31, 2018 1:27 PM

## 2018-07-31 NOTE — WOUND CARE
Physical Exam  
Musculoskeletal:  
     Legs: 
Seen by wound care per consult, skin assessment performed at this time. Discolored are listed above noted during skin assessment. Treatment plan explained to [facility] and agreeable to continue current preventive treatment. No open wounds or tissue injury noted. No wound care education provided at this time. Plan of care reviewed with nursing. Will turn over care to nursing staff at this time Sendy Linn, Wound Care Department

## 2018-07-31 NOTE — PROGRESS NOTES
conducted an initial consultation and Spiritual Assessment for Audrey Scales, who is a 66 y.o.,female. Patients Primary Language is: [place]. According to the patients EMR Sikh Affiliation is: [email or web address]. The reason the Patient came to the hospital is:  
Patient Active Problem List  
 Diagnosis Date Noted  HCAP (healthcare-associated pneumonia) 07/26/2018  Atrial fibrillation with RVR ([address] 06/23/2018  Atrial fibrillation with RVR ([facility] [address] 06/23/2018  Chronic anticoagulation 06/23/2018  Lower extremity edema 06/23/2018  Psychiatric disorder 06/23/2018  Edema 02/28/2017  Shortness of breath 02/28/2017  Pneumonia 02/28/2017  Cellulitis of left lower extremity 02/28/2017  Hip arthritis 01/26/2017  Cough 04/12/2016 The  provided the following Interventions: 
Initiated a relationship of care and support. Explored issues of giancarlo, belief, spirituality and Congregation/ritual needs while hospitalized. Listened empathically. Provided chaplaincy education. Provided information about Spiritual Care Services. Offered prayer and assurance of continued prayers on patient's behalf. Chart reviewed. The following outcomes where achieved: 
Patient shared limited information about both their medical narrative and spiritual journey/beliefs.  confirmed Patient's Sikh Affiliation. Patient processed feeling about current hospitalization. Patient expressed gratitude for 's visit. Assessment: 
Patient does not have any Congregation/cultural needs that will affect patients preferences in health care. There are no spiritual or Congregation issues which require intervention at this time. Plan: 
Chaplains will continue to follow and will provide pastoral care on an as needed/requested basis.  recommends bedside caregivers page  on duty if patient shows signs of acute spiritual or emotional distress.  
 
Kilo [facility]  
[phone]

## 2018-07-31 NOTE — PROGRESS NOTES
Bedside shift change report given to Pratibha Chau RN (oncoming nurse) by Aurora Rome (offgoing nurse). Report included the following information SBAR.

## 2018-07-31 NOTE — PROGRESS NOTES
[facility]  -  Progress Note Patient: Marline Wagner MRN: [id number]  SSN: [id number] YOB: 1940  Age: 66 y.o. Sex: female Admit Date: 7/26/2018 Hospital Problem List:  
 
Hospital Problems  Date Reviewed: 6/23/2018 Codes Class Noted POA HCAP (healthcare-associated pneumonia) ICD-10-CM: J18.9 ICD-9-CM: 122  7/26/2018 Unknown  
   
  
 
-AMS, CT head negative for acute CVA, improved -HCAP, on abx 
-Likely dehydration -Hypokalemia and  Hypomagnesia, being repleted 
-Pleural effusions, R > L 
-Hx of chronic afib, rate controlled, on Eliquis for OAC 
-Diastolic CHF, compensated, Normal LV function by echo June 23, 2018 with EF 50-55%, moderate to severe MR and severe wide open TR.  
-Pulmonary HTN, 2/2 above 
-Parkinson's disease 
-Morbid obesity 
-Deconditioning, SNF resident, at baseline walks a little 
-COPD 
-Full Code 
   
-Follows with [address] [place] [address]:  
 
-Renal function slightly improved off diuretics and ARB. -Off Eliquis for possible thoracentesis. Continued on aspirin.  
-Lopressor decreased yesterday with improved BP and HR stable in upper 50's. Continue. -Goals of care being addressed with Hospice and family. Subjective:  
 
Feels less SOB. On room air. Somewhat dyspneic in conversation. No family at bedside currently. Objective:  
  
Patient Vitals for the past 8 hrs: 
 Temp Pulse Resp BP SpO2  
07/31/18 1021 97.5 °F (36.4 °C) (!) 54 18 148/74 93 % 07/31/18 0738 97.4 °F (36.3 °C) (!) 52 18 147/65 95 % 07/31/18 0436 98.7 °F (37.1 °C) 74 20 124/87 94 % Patient Vitals for the past 96 hrs: 
 Weight  
07/31/18 0448 95.4 kg (210 lb 4.8 oz) 07/30/18 0338 94.1 kg (207 lb 8 oz)  
07/29/18 0412 92.9 kg (204 lb 12.8 oz)  
07/28/18 0300 102.2 kg (225 lb 4.8 oz) No intake or output data in the 24 hours ending 07/31/18 1229 Physical Exam: 
General: awake, alert, oriented x 3 Neck:  supple without jvd 
Lungs:  Clear on anterolateral auscultation, on room air, slightly sob in conversation Heart:  irreg rate and rhythm, no murmur Abdomen: obese, soft Extremities: no edema, atraumatic Data Review:  
 
Labs: Results:  
   
Chemistry Recent Labs  
   07/31/18 
 0235  07/30/18 
 0231  07/29/18 
 7152 GLU  158*  124*  248* NA  142  143  141  
K  2.9*  3.2*  3.8 CL  111*  110*  110* CO2  20*  22  17* BUN  19*  17  16 CREA  2.18*  2.54*  2.54* CA  8.1*  8.0*  7.9* AGAP  11  11  14 BUCR  9*  7*  6* CBC w/Diff Recent Labs  
   07/31/18 0235 WBC  7.6 RBC  3.66* HGB  10.5* HCT  32.9*  
PLT  265 Cardiac Enzymes No results found for: CPK, CK, CKMMB, CKMB, RCK3, CKMBT, CKNDX, CKND1, NAMAN, TROPT, TROIQ, RONA, TROPT, TNIPOC, BNP, BNPP Coagulation No results for input(s): PTP, INR, APTT in the last 72 hours. No lab exists for component: INREXT Lipid Panel Lab Results Component Value Date/Time Cholesterol, total 118 (L) 03/01/2017 09:54 AM  
 HDL Cholesterol 48 03/01/2017 09:54 AM  
 LDL, calculated 54 03/01/2017 09:54 AM  
 Triglyceride 78 03/01/2017 09:54 AM  
 CHOL/HDL Ratio 2.5 03/01/2017 09:54 AM  
  
BNP Lab Results Component Value Date/Time BNP 88.0 04/12/2016 10:30 AM  
  
Liver Enzymes No results for input(s): TP, ALB, TBIL, AP, SGOT, GPT in the last 72 hours. No lab exists for component: DBIL Digoxin Thyroid Studies Lab Results Component Value Date/Time TSH 2.00 07/26/2018 01:50 PM  
    
 
 
Signed By: KRISTIN Love   
 July 31, 2018

## 2018-08-01 NOTE — PROGRESS NOTES
Problem: Mobility Impaired (Adult and Pediatric) Goal: *Acute Goals and Plan of Care (Insert Text) Physical Therapy Goals Initiated 7/30/2018 and to be accomplished within 7 day(s) 1. Patient will move from supine to sit and sit to supine  and roll side to side in bed with minimal assistance/contact guard assist.    
2.  Patient will transfer from bed to chair and chair to bed with minimal assistance/contact guard assist using the least restrictive device. 3.  Patient will perform sit to stand with minimal assistance/contact guard assist. 
4.  Patient will ambulate with minimal assistance/contact guard assist for >5 feet with the least restrictive device. physical Therapy TREATMENT Patient: Clifford Childs (52 y.o. female) Date: 8/1/2018 Diagnosis: HCAP (healthcare-associated pneumonia) <principal problem not specified> Precautions: Fall, Skin Chart, physical therapy assessment, plan of care and goals were reviewed. ASSESSMENT: 
Patient presents today alert and agreeable to therapy and was supine in bed upon arrival. Patient had NC O2 donned upon arrival and nursing cleared patient for therapy. Patient transferred to EOB with additional time and cues for hand placement. Once sitting EOB, patient required cues to laterally weight shift for scooting and demos fair to good balance. Patient transferred to standing with 2 person assist; Min/Mod. Patient attempted to weight shift to take steps to stretcher in room. Patient unable to safely perform and was assist back to sitting. Patient performed log roll back to supine, scooted toward HOB using BLE's to push against foot board and then was transferred onto stretcher with transport. Handoff performed to transport at this time.   
Progression toward goals: 
[x]      Improving appropriately and progressing toward goals 
[]      Improving slowly and progressing toward goals 
[]      Not making progress toward goals and plan of care will be adjusted PLAN: 
Patient continues to benefit from skilled intervention to address the above impairments. Continue treatment per established plan of care. Discharge Recommendations:  Mykel Tariq Further Equipment Recommendations for Discharge:  N/A  
 
G-CODES:  
 
Mobility  Current  CL= 60-79%   Goal  CJ= 20-39%. The severity rating is based on the Level of Assistance required for Functional Mobility and ADLs. SUBJECTIVE:  
Patient stated I'm at the hospital. oriented to self and place today OBJECTIVE DATA SUMMARY:  
Critical Behavior: 
Neurologic State: Alert Orientation Level: Oriented to person Cognition: Follows commands Safety/Judgement: Fall prevention Functional Mobility Training: 
Bed Mobility: 
 Supine to Sit: Maximum assistance Sit to Supine: Maximum assistance Scooting: Total assistance Transfers: 
Sit to Stand: Minimum assistance; Moderate assistance; Additional time;Assist x2 Balance: 
Sitting: Impaired; With support Sitting - Static: Good (unsupported) Sitting - Dynamic: Fair (occasional) Standing: Impaired; With support Standing - Static: Poor Standing - Dynamic : Poor Pain: 
Pt reports 0/10 pain or discomfort prior to treatment.   
Pt reports 0/10 pain or discomfort post treatment. Activity Tolerance:  
Patient tolerated activity well; limited session as transport arrived to take patient for testing. Please refer to the flowsheet for vital signs taken during this treatment. After treatment:  
[] Patient left in no apparent distress sitting up in chair 
[x] Patient left in no apparent distress in bed 
[x] Call bell left within reach [x] Nursing notified 
[] Caregiver present 
[] Bed alarm activated Annella Kocher, PT Time Calculation: 23 mins

## 2018-08-01 NOTE — PROGRESS NOTES
[facility]  -  Progress Note Patient: Wilfredo Amor MRN: [id number]  SSN: [id number] YOB: 1940  Age: 66 y.o. Sex: female Admit Date: 7/26/2018 Hospital Problem List:  
 
Hospital Problems  Date Reviewed: 6/23/2018 Codes Class Noted POA HCAP (healthcare-associated pneumonia) ICD-10-CM: J18.9 ICD-9-CM: 079  7/26/2018 Unknown  
   
  
 
-AMS, CT head negative for acute CVA, improved -HCAP, on abx 
-Likely dehydration -Hypokalemia and  Hypomagnesia, being repleted 
-Pleural effusions, R > L 
-Hx of chronic afib, rate controlled, on Eliquis for OAC 
-Diastolic CHF, compensated, Normal LV function by echo June 23, 2018 with EF 50-55%, moderate to severe MR and severe wide open TR.  
-Pulmonary HTN, 2/2 above 
-Parkinson's disease 
-Morbid obesity 
-Deconditioning, SNF resident, at baseline walks a little 
-COPD 
-Full Code 
   
-Follows with Steffanie muñoz Cardiologist 
 
Assessment & Plan:  
 
-s/p thoracentesis today with removal of about 450mL. O2 sats in upper 90's. Pt resting/sleeping and appears comfortable. -HR around 50bpm. Continued on Lopressor 12.5mg. BP suboptimal. Renal function improved today. Would resume cozaar. If not seeing improvement, can add amlodipine to regimen. Subjective:  
 
Sleeping soundly. Family member at bedside. Objective:  
  
Patient Vitals for the past 8 hrs: 
 Temp Pulse Resp BP SpO2  
08/01/18 0814 96.8 °F (36 °C) (!) 47 18 150/73 98 % 08/01/18 0808 97.4 °F (36.3 °C) (!) 58 18 199/74 99 % Patient Vitals for the past 96 hrs: 
 Weight 08/01/18 0422 94.9 kg (209 lb 4.8 oz) 07/31/18 0448 95.4 kg (210 lb 4.8 oz) 07/30/18 0338 94.1 kg (207 lb 8 oz)  
07/29/18 0412 92.9 kg (204 lb 12.8 oz) Intake/Output Summary (Last 24 hours) at 08/01/18 1253 Last data filed at 08/01/18 5437 Gross per 24 hour Intake                0 ml Output             1100 ml Net            -1100 ml Physical Exam: 
General:  Sleeping soundly, appears comfortable Neck:  supple Lungs:  CTAB Heart:  Reg rhythm, no murmur, erika Abdomen: soft Extremities:  Without edema, atraumatic Data Review:  
 
Labs: Results:  
   
Chemistry Recent Labs 08/01/18 
 1598  07/31/18 
 0235  07/30/18 
 0231 GLU  156*  158*  124* NA  142  142  143  
K  4.0  2.9*  3.2*  
CL  115*  111*  110* CO2  19*  20*  22 BUN  15  19*  17  
CREA  1.45*  2.18*  2.54* CA  8.3*  8.1*  8.0* AGAP  8  11  11 BUCR  10*  9*  7* AP  83   --    --   
TP  6.8   --    --   
ALB  2.6*   --    --   
GLOB  4.2*   --    --   
AGRAT  0.6*   --    --   
  
CBC w/Diff Recent Labs 08/01/18 
 [phone]  07/31/18 
 0235 WBC  7.6  7.6  
RBC  3.78*  3.66* HGB  10.9*  10.5* HCT  34.7*  32.9*  
PLT  245  265 Cardiac Enzymes No results found for: CPK, CK, CKMMB, CKMB, RCK3, CKMBT, CKNDX, CKND1, NAMAN, TROPT, TROIQ, RONA, TROPT, TNIPOC, BNP, BNPP Coagulation Recent Labs 08/01/18 
 0930  07/31/18 
 1648 PTP  18.6*  20.1* INR  1.6*  1.7* APTT   --   47.0* Lipid Panel Lab Results Component Value Date/Time Cholesterol, total 118 (L) 03/01/2017 09:54 AM  
 HDL Cholesterol 48 03/01/2017 09:54 AM  
 LDL, calculated 54 03/01/2017 09:54 AM  
 Triglyceride 78 03/01/2017 09:54 AM  
 CHOL/HDL Ratio 2.5 03/01/2017 09:54 AM  
  
BNP Lab Results Component Value Date/Time BNP 88.0 04/12/2016 10:30 AM  
  
Liver Enzymes Recent Labs 08/01/18 
 0355 TP  6.8 ALB  2.6* AP  83 SGOT  16  
  
Digoxin Thyroid Studies Lab Results Component Value Date/Time TSH 2.00 07/26/2018 01:50 PM  
    
 
 
Signed By: KRISTIN Sorensen August 1, 2018

## 2018-08-01 NOTE — PROGRESS NOTES
[facility] Progress Note Patient: Gwen Banegas Age: 66 y.o. : 1940 MR#: [id number] SSN: [id number] Date/Time: 2018 Subjective:  
 
Patient is drowsy s/p thoracentesis. Assessment/Plan:  
-Acute metabolic encephalopathy multifactorial and improving. -HCAP on vanc and zosyn, pulmonology following, discussed plan of care w// pulmonologist, plans to de escalate abx in the near future. 
-Chronic diastolic HF compensated cardiology following, agree w/ holding diuretics in light of elevated creat. 
-Acute on chronic renal failure cause unclear ?pre renal due to dehydration, diuretic use pta? Cozaar also one of her dc meds from recent hospitalization. Creat with some improvement today. -Right greater than left pleural effusion-pulmonary following. follow thoracentesis results; resume [address] when okay with IR. -Impaired mobility-? Due to de conditioning, PT/OT  
-History of moderate to severe MR and wide open TR . She is not surgical candidate.  
-history of chronic afib on eliquis, to be held in preparation for thoracentesis. -history of Parkinson's disease 
-history of pulm HTN, COPD-stable 
-recent admission to a [facility] for management of acute on chronic combined systolic and diastolic HF. 
- DNR. No durable DNR on file, will check blue chart. Disposition: daughter interested in Hospice assistance. Hospice consulted. Anticipate dc in 48 hrs after thoracentesis and course of IV abx for rx of HCAP. PT recommends SNF. Discussed w/ CRISTOBAL JAIN to contact pt's daughter to confirm if daughter wants home with  hospice vs SNF placement. Additional Notes:   
 
Case discussed with:  [x]Patient  []Family  [x]Nursing  []Case Management DVT Prophylaxis:  []Lovenox  []Hep SQ  []SCDs  []Coumadin   []On Heparin gtt Objective:  
VS:  
Visit Vitals  /73 (BP 1 Location: Left arm, BP Patient Position: At rest)  Pulse (!) 47  Temp 96.8 °F (36 °C)  Resp 18  Ht 5' 5\" (1.651 m)  Wt 94.9 kg (209 lb 4.8 oz)  SpO2 98%  Breastfeeding No  
 BMI 34.83 kg/m2 Tmax/24hrs: Temp (24hrs), Av.6 °F (36.4 °C), Min:96.8 °F (36 °C), Max:98.5 °F (36.9 °C) Input/Output:  
 
Intake/Output Summary (Last 24 hours) at 18 1043 Last data filed at 18 5529 Gross per 24 hour Intake                0 ml Output             1100 ml Net            -1100 ml General: somnolent wakes up easily to verbal stimulus in nad Cardiovascular: rrr, no murmurs Pulmonary: ctab GI:  Soft, nt, nd 
Extremities:  No edema Additional:   
 
Labs:   
Recent Results (from the past 24 hour(s)) GLUCOSE, POC Collection Time: 18 10:53 AM  
Result Value Ref Range Glucose (POC) 186 (H) 70 - 110 mg/dL GLUCOSE, POC Collection Time: 18  3:54 PM  
Result Value Ref Range Glucose (POC) 203 (H) 70 - 110 mg/dL PROTHROMBIN TIME + INR Collection Time: 18  4:48 PM  
Result Value Ref Range Prothrombin time 20.1 (H) 11.5 - 15.2 sec INR 1.7 (H) 0.8 - 1.2 PTT Collection Time: 18  4:48 PM  
Result Value Ref Range aPTT 47.0 (H) 23.0 - 36.4 SEC GLUCOSE, POC Collection Time: 18 10:35 PM  
Result Value Ref Range Glucose (POC) 220 (H) 70 - 110 mg/dL METABOLIC PANEL, COMPREHENSIVE Collection Time: 18  3:55 AM  
Result Value Ref Range Sodium 142 136 - 145 mmol/L Potassium 4.0 3.5 - 5.5 mmol/L Chloride 115 (H) 100 - 108 mmol/L  
 CO2 19 (L) 21 - 32 mmol/L Anion gap 8 3.0 - 18 mmol/L Glucose 156 (H) 74 - 99 mg/dL BUN 15 7.0 - 18 MG/DL Creatinine 1.45 (H) 0.6 - 1.3 MG/DL  
 BUN/Creatinine ratio 10 (L) 12 - 20 GFR est AA 42 (L) >60 ml/min/1.73m2 GFR est non-AA 35 (L) >60 ml/min/1.73m2 Calcium 8.3 (L) 8.5 - 10.1 MG/DL Bilirubin, total 0.7 0.2 - 1.0 MG/DL  
 ALT (SGPT) 17 13 - 56 U/L  
 AST (SGOT) 16 15 - 37 U/L Alk.  phosphatase 83 45 - 117 U/L  
 Protein, total 6.8 6.4 - 8.2 g/dL Albumin 2.6 (L) 3.4 - 5.0 g/dL Globulin 4.2 (H) 2.0 - 4.0 g/dL A-G Ratio 0.6 (L) 0.8 - 1.7    
CBC W/O DIFF Collection Time: 08/01/18  3:55 AM  
Result Value Ref Range WBC 7.6 4.6 - 13.2 K/uL  
 RBC 3.78 (L) 4.20 - 5.30 M/uL  
 HGB 10.9 (L) 12.0 - 16.0 g/dL HCT 34.7 (L) 35.0 - 45.0 % MCV 91.8 74.0 - 97.0 FL  
 MCH 28.8 24.0 - 34.0 PG  
 MCHC 31.4 31.0 - 37.0 g/dL  
 RDW 15.1 (H) 11.6 - 14.5 % PLATELET 817 797 - 527 K/uL MPV 10.8 9.2 - 11.8 FL  
GLUCOSE, POC Collection Time: 08/01/18  8:12 AM  
Result Value Ref Range Glucose (POC) 138 (H) 70 - 110 mg/dL PROTHROMBIN TIME + INR Collection Time: 08/01/18  9:30 AM  
Result Value Ref Range Prothrombin time 18.6 (H) 11.5 - 15.2 sec INR 1.6 (H) 0.8 - 1.2 Additional Data Reviewed:   
 
Signed By: Nereyda Underwood MD   
 August 1, 2018 1:27 PM

## 2018-08-01 NOTE — ROUTINE PROCESS
Bedside shift change report given to Ibeth Pickens (oncoming nurse) by Oskar Concepcion (offgoing nurse). Report included the following information SBAR.

## 2018-08-01 NOTE — ROUTINE PROCESS
Bedside and Verbal shift change report given to Juju RN (oncoming nurse) by Missy Lopez RN 
 (offgoing nurse). Report included the following information SBAR, Kardex, Intake/Output and MAR.

## 2018-08-01 NOTE — PROCEDURES
RADIOLOGY POST THORACENTESIS NOTE August 1, 2018       11:24 AM  
 
:  Mignon Cooper PA-C Assistant:  None. Procedure:  US-guided right thoracentesis Pre-operative Diagnosis: Right pleural effusion Post-operative Diagnosis: same Procedure Details: Informed consent obtained. Standard aseptic technique. Ultrasound guidance. Right posterior intercostal approach. 1% lidocaine for local anesthesia. 5 [employer] Rena Yueh sheathed needle connected to vacuum bottle. 450mL of fluid removed. Please see final dictated report for full details. Complications:  No immediate. Estimated blood Loss:  Minimal 
            
Condition: Stable. Impression:  Successful thoracentesis. Plan: Post procedure chest xray pending.  
 
 
Amilcar Cao

## 2018-08-01 NOTE — PROGRESS NOTES
[facility] Pulmonary, Critical Care, and Sleep Medicine Initial Patient Follow Up Name: Tai Chau MRN: [id number] : 1940 Hospital: [facility] Date: 2018 IMPRESSION:  
-Acute metabolic encephalopathy- toxic/metabolic superimposed on dementia. -Healthcare-associated pneumonia- started on broad-spectrum antibiotics for now.  
 
-Acute on chronic renal failure- most likely aggravated by diruesis - BUN/Creat: 
 
-Chronic diastolic heart failure-Preserved ejection fraction on recent echocardiogram.  Moderate to severe mitral regurgitation, wide-open tricuspid regurgitation. Chronic atrial fibrillation.   
 
-Right greater than left pleural effusions- chronic diastolic heart failure and poor cough with basal atelectasis s/p IR thoracentesis earlier today- By Light's Criteria- Protein: appears transudative which is more consistent with CHF. Other labs still pending. 
 
-Parkinson's,- under the care of Dr. Tri Jordan. -Morbid obesity. 
 
-Ambulatory dysfunction. She was walking a couple of months ago, but no longer. 
 
-Deep venous thrombosis prophylaxis. -on Eliquis. SHE IS A FULL CODE. RECOMMENDATIONS:  
· Oxygen- titrate to SaO2 goal > 90% · CXR- AP/LAT in am 
· Bronchial hygiene protocol · Bronchodilators prn · Aspiration precautions · Diuresis per primary team 
· Continue to hold Eliquis - plan to restart 18 · Continue antibiotics per primary team.- Consider changing to doxycycline to complete today 5 day course · Follow up on pending results of fluid  analysis from thoracentesis · Continue work up for altered mental status · Assess home Oxygen needs at discharge · OT, PT, OOB and ambulate · Other medical management per primary team and respective consultants Subjective: This patient has been seen and evaluated at the request of Dr. Donn Gusman for [facility]AP. History obtained from chart review- patient not conversant. Patient is a 66 y.o. female This is a 70-year-old -American female with a past medical history of Parkinson disease, recent admission for CHF at [facility], she has an EF of 51-55%, moderate to severe MR and wide-open TR with pulmonary hypertension. She also has chronic atrial fibrillation, maintained on Eliquis. Her cardiologist is in [place]. During her admission at [facility] she was diuresed. She had returned to [facility]. The daughter at the bedside provides most of the history and she states that the patient is not a candidate for rehab because she does not recall the exercises and what she was taught there. She was brought into the ED sent by [facility] for altered mental status. In the ED, she was noted to have a potassium of 2.7 and a magnesium of 1.2. She has bilateral pleural effusions, right greater than left, with likely bilateral infiltrates as well as atelectasis. Her daughter relates that they had appealed her discharge at [facility], but she ultimately had been discharged back to [facility]. She feels that the patient needed more time in the hospital.  There was report of fever from [facility], a scant cough. The patient described pain in her legs and cramping. There was no nausea or vomiting, but there was a report of decreased appetite. No report of foul odor to her urine. She did have some diarrhea during her recent hospital stay. At her baseline, she walks a little. She was walking just a couple of months ago. The daughter also states that at her baseline mental status, she knows her date of birth, but she does not know the day of the week or the year. No further review of systems is obtainable related to the patient's altered mental status. In the ED, she received Zosyn as well as Tylenol, magnesium sulfate 4 grams IV x1, three runs of potassium.   She also received a 1-liter bolus of normal saline as well as vancomycin. 7/30/18- Patient has two twin, adult daughters. I spoke with once daughter today. More difficult to diurese patient due to increased BUN/Creat. Has persistent effusions but on Eliquis. We discussed possible US- guided thoracentesis. Family discussing possible hospice. Both sisters will discuss possible thoracentesis together. Will hold Eliquis today in case daughters wish to pursue thoracentesis. Pending clinical course will streamline antibiotics tomorrow. 08/01/18 
-Patient resting in bed, On 2LPM- SpO2: 98%, sleepy but will speak a few words - Afebrile - WBC normal 
- one adult daughter was in the room and I spoke with her as well Patient Vitals for the past 12 hrs: 
 Temp Pulse Resp BP SpO2  
08/01/18 0814 96.8 °F (36 °C) (!) 47 18 150/73 98 % 08/01/18 0808 97.4 °F (36.3 °C) (!) 58 18 199/74 99 % 08/01/18 0408 97.4 °F (36.3 °C) 80 18 157/78 95 % Past Medical History:  
Diagnosis Date  Anemia NEC  Arthritis  Arthropathy, unspecified, site unspecified  Asthma  Atrial fibrillation ([facility] [address] CHRONIC A-FIB  Diabetes mellitus  Essential hypertension  Heart abnormalities   
 irregular heartbeat  Hypertension  Liver disease   
 steatohepatitis  Neurological disorder   
 dementia  Osteoarthritis LEFT HIP Past Surgical History:  
Procedure Laterality Date  HX BUNIONECTOMY  HX HIP REPLACEMENT  11/2014  HX OTHER SURGICAL  5/27/12  
 colon mass removed  HX TUBAL LIGATION Prior to Admission medications Medication Sig Start Date End Date Taking? Authorizing Provider  
acetaminophen (TYLENOL) 325 mg tablet Take 2 Tabs by mouth every six (6) hours as needed for Pain or Fever. Indications: Arthritic Pain, Fever 6/26/18   Kennedi Mai MD  
albuterol (PROVENTIL HFA, VENTOLIN HFA, PROAIR HFA) 90 mcg/actuation inhaler Take 1 Puff by inhalation every six (6) hours as needed (Asthma/cough). Indications: Acute Asthma Attack 6/26/18   Angel Flores MD  
albuterol (PROVENTIL VENTOLIN) 2.5 mg /3 mL (0.083 %) nebulizer solution 3 mL by Nebulization route every four (4) hours as needed for Wheezing. Indications: Chronic Obstructive Pulmonary Disease 6/26/18   Angel Flores MD  
amiodarone (CORDARONE) 200 mg tablet Take 1 Tab by mouth daily. Indications: Cardioversion of Atrial Fibrillation 6/26/18   Angel Flores MD  
apixaban (ELIQUIS) 5 mg tablet Take 1 Tab by mouth two (2) times a day. Indications: Cerebral Thromboembolism Prevention 6/26/18   Angel Flores MD  
aspirin delayed-release 81 mg tablet Take 1 Tab by mouth daily. Indications: CAD 6/26/18   Angel Flores MD  
cholecalciferol (VITAMIN D3) 1,000 unit cap Take 2 Caps by mouth daily. Daughter is unsure about the dosage. Indications: Osteoporosis, Vitamin D Deficiency 6/26/18   Angel Flores MD  
famotidine (PEPCID) 20 mg tablet Take 1 Tab by mouth nightly. Indications: gastroesophageal reflux disease 6/26/18   Angel Flores MD  
FLUoxetine (PROZAC) 20 mg tablet Take 2 Tabs by mouth nightly. Indications: ANXIETY WITH DEPRESSION 6/26/18   Angel Flores MD  
furosemide (LASIX) 20 mg tablet Take 1 Tab by mouth daily as needed. Indications: Edema 6/26/18   Angel Flores MD  
glipiZIDE SR (GLUCOTROL XL) 10 mg CR tablet Take 1 Tab by mouth two (2) times a day. 1 in the morning and 1 at bedtime. Indications: type 2 diabetes mellitus 6/26/18   Angel Flores MD  
insulin lispro (HUMALOG) 100 unit/mL injection SubCUTAneous route Before breakfast, lunch, dinner and at bedtime. For Blood Sugar (mg/dL) of:    
Less than 150 =   0 units 150 -199 =   2 units 200 -249 =   4 units 250 -299 =   6 units 300 -349 =   8 units 350 and above =  10 units  Indications: type 2 diabetes mellitus 6/26/18 Monster Lobato MD  
losartan (COZAAR) 25 mg tablet Take 1 Tab by mouth daily. Indications: hypertension 6/26/18   Monster Lobato MD  
Menthol-Zinc Oxide (CALMOSEPTINE) 0.44-20.6 % oint Apply 1 g to affected area three (3) times daily as needed (For rash, apply to inner buttocks). Indications: Diaper Rash 6/26/18   Monster Lobato MD  
metFORMIN (GLUCOPHAGE) 1,000 mg tablet Take 1 Tab by mouth two (2) times daily (with meals). Indications: type 2 diabetes mellitus 6/26/18   Monster Lobato MD  
metoprolol tartrate (LOPRESSOR) 25 mg tablet Take 1 Tab by mouth every twelve (12) hours. Indications: VENTRICULAR RATE CONTROL IN ATRIAL FIBRILLATION 6/26/18   Monster Lobato MD  
[facility]) 220 mcg (120 doses) aepb inhaler Take 1 Puff by inhalation daily. Powder that patient inhales. 220mcg. Indications: MAINTENANCE THERAPY FOR ASTHMA 6/26/18   Monster Lobato MD  
montelukast (SINGULAIR) 10 mg tablet Take 1 Tab by mouth daily. Indications: MAINTENANCE THERAPY FOR ASTHMA 6/26/18   Monster Lobato MD  
polyethylene glycol (MIRALAX) 17 gram/dose powder Take 17 g by mouth daily as needed. Indications: constipation 6/26/18   Monster Lobato MD  
prednisoLONE acetate (PRED FORTE) 1 % ophthalmic suspension Administer 1 Drop to left eye two (2) times a day. Please verify with  Patient's daughter the percentage. Thanks,   Indications: PREVENTION OF CYTARABINE-INDUCED KERATOCONJUNCTIVITIS 6/26/18   Monster Lobato MD  
QUEtiapine (SEROQUEL) 25 mg tablet Take 0.5 Tabs by mouth two (2) times a day. Indications: Generalized Anxiety Disorder 6/26/18   Monster Lobato MD  
risperiDONE (RISPERDAL) 1 mg tablet Take 1 Tab by mouth nightly.  Indications: BIPOLAR DISORDER IN REMISSION, DEPRESSION ASSOCIATED WITH BIPOLAR DISORDER 6/26/18   Monster Lobato MD  
simvastatin (ZOCOR) 10 mg tablet Take 1 Tab by mouth nightly. Indications: hypertriglyceridemia 6/26/18   Alesha De Anda MD  
risperiDONE (RISPERDAL) 1 mg tablet Take 1 Tab by mouth nightly. 6/24/18   Caridad Moy MD  
 
Allergies Allergen Reactions  Compazine [Prochlorperazine Edisylate] Other (comments) Twitching, seizure-like symptoms Social History Substance Use Topics  Smoking status: Never Smoker  Smokeless tobacco: Never Used  Alcohol use No  
  
Family History Problem Relation Age of Onset  Hypertension Mother  Diabetes Father  Diabetes Daughter Current Facility-Administered Medications Medication Dose Route Frequency  potassium chloride (K-DUR, KLOR-CON) SR tablet 40 mEq  40 mEq Oral TID  doxycycline (VIBRAMYCIN) capsule 100 mg  100 mg Oral Q12H  
 metoprolol tartrate (LOPRESSOR) tablet 12.5 mg  12.5 mg Oral Q12H  
 lactulose (CHRONULAC) solution 20 g  20 g Oral BID  insulin lispro (HUMALOG) injection   SubCUTAneous AC&HS  insulin glargine (LANTUS) injection 4 Units  4 Units SubCUTAneous QHS  FLUoxetine (PROzac) capsule 20 mg  20 mg Oral DAILY  QUEtiapine (SEROquel) tablet 12.5 mg  12.5 mg Oral QHS  risperiDONE (RisperDAL) tablet 0.5 mg  0.5 mg Oral QHS  magnesium oxide (MAG-OX) tablet 400 mg  400 mg Oral DAILY  aspirin delayed-release tablet 81 mg  81 mg Oral DAILY  cholecalciferol (VITAMIN D3) tablet 2,000 Units  2,000 Units Oral DAILY  famotidine (PEPCID) tablet 20 mg  20 mg Oral QHS  prednisoLONE acetate (PRED FORTE) 1 % ophthalmic suspension 1 Drop  1 Drop Left Eye BID  simvastatin (ZOCOR) tablet 10 mg  10 mg Oral QHS Review of Systems: 
Review of systems not obtained due to patient factors. Objective:  
Vital Signs:   
Visit Vitals  /73 (BP 1 Location: Left arm, BP Patient Position: At rest)  Pulse (!) 47  Temp 96.8 °F (36 °C)  Resp 18  Ht 5' 5\" (1.651 m)  Wt 94.9 kg (209 lb 4.8 oz)  SpO2 98%  Breastfeeding No  
 BMI 34.83 kg/m2 O2 Device: Room air O2 Flow Rate (L/min): 2 l/min Temp (24hrs), Av.6 °F (36.4 °C), Min:96.8 °F (36 °C), Max:98.5 °F (36.9 °C) Intake/Output:  
Last shift:        
Last 3 shifts:  1901 -  0700 In: -  
Out: 1100 [Urine:1100] Intake/Output Summary (Last 24 hours) at 18 1323 Last data filed at 18 7874 Gross per 24 hour Intake                0 ml Output             1100 ml Net            -1100 ml Physical Exam:  
General:  Appears stated age, resting comfortable in bed Head:  Normocephalic, without obvious abnormality, atraumatic. Eyes:  Conjunctivae/corneas clear. PERRL, EOMs intact. Nose: Nares normal. Septum midline. Mucosa normal. No drainage or sinus tenderness. Throat: Lips, mucosa, and tongue normal.- No exudates Neck: Supple, symmetrical, trachea midline, no adenopathy, thyroid: no enlargment/tenderness/nodules, no carotid bruit. Back:   Symmetric, no curvature. ROM normal.  
Lungs:   Clear to auscultation bilaterally. - improved at bases, no wheezing or rhonchi- no accessory muscle use, cough with deep inspiration Chest wall:  No tenderness or deformity. Heart:  Regular rate and rhythm, S1, S2 normal, + murmur, No click, rub or gallop. Abdomen:   Soft, non-tender. Bowel sounds normal. No masses,  No organomegaly. Extremities: Extremities normal, atraumatic, no cyanosis or edema. Pulses: 2+ and symmetric all extremities. Skin: Skin color, texture, turgor normal. No rashes or lesions Lymph nodes: Cervical, supraclavicular nodes, non-palpable Neurologic: Grossly nonfocal  
 
Data review:  
 
 
 
 
Results for Abdirahman Renner (MRN [id number]) as of 2018 10:11 Ref. Range 2018 05:29 2018 04:30 2018 06:37 2018 04:03 2018 09:12 BUN Latest Ref Range: 7.0 - 18 MG/DL 8 12   16 Creatinine Latest Ref Range: 0.6 - 1.3 MG/DL 1.16 1.79 (H)   2.54 (H) Results for Dorys Demarco (MRN [id number]) as of 7/29/2018 10:11 Ref. Range 6/25/2018 02:46 6/26/2018 03:18 7/26/2018 13:50 7/27/2018 05:29 7/28/2018 06:37 WBC Latest Ref Range: 4.6 - 13.2 K/uL 6.6 5.9 8.2 5.3 6.1 Lab Results Component Value Date/Time BNP 88.0 04/12/2016 10:30 AM  
 NT pro-BNP 5451 (H) 07/29/2018 09:12 AM  
 NT pro-BNP 6620 (H) 07/26/2018 01:50 PM  
 NT pro- 08/08/2017 06:45 PM  
 NT pro-BNP 1646.8 (H) 02/28/2017 05:33 PM  
 
 
 
Recent Results (from the past 24 hour(s)) GLUCOSE, POC Collection Time: 07/31/18  3:54 PM  
Result Value Ref Range Glucose (POC) 203 (H) 70 - 110 mg/dL PROTHROMBIN TIME + INR Collection Time: 07/31/18  4:48 PM  
Result Value Ref Range Prothrombin time 20.1 (H) 11.5 - 15.2 sec INR 1.7 (H) 0.8 - 1.2 PTT Collection Time: 07/31/18  4:48 PM  
Result Value Ref Range aPTT 47.0 (H) 23.0 - 36.4 SEC GLUCOSE, POC Collection Time: 07/31/18 10:35 PM  
Result Value Ref Range Glucose (POC) 220 (H) 70 - 110 mg/dL METABOLIC PANEL, COMPREHENSIVE Collection Time: 08/01/18  3:55 AM  
Result Value Ref Range Sodium 142 136 - 145 mmol/L Potassium 4.0 3.5 - 5.5 mmol/L Chloride 115 (H) 100 - 108 mmol/L  
 CO2 19 (L) 21 - 32 mmol/L Anion gap 8 3.0 - 18 mmol/L Glucose 156 (H) 74 - 99 mg/dL BUN 15 7.0 - 18 MG/DL Creatinine 1.45 (H) 0.6 - 1.3 MG/DL  
 BUN/Creatinine ratio 10 (L) 12 - 20 GFR est AA 42 (L) >60 ml/min/1.73m2 GFR est non-AA 35 (L) >60 ml/min/1.73m2 Calcium 8.3 (L) 8.5 - 10.1 MG/DL Bilirubin, total 0.7 0.2 - 1.0 MG/DL  
 ALT (SGPT) 17 13 - 56 U/L  
 AST (SGOT) 16 15 - 37 U/L Alk. phosphatase 83 45 - 117 U/L Protein, total 6.8 6.4 - 8.2 g/dL Albumin 2.6 (L) 3.4 - 5.0 g/dL Globulin 4.2 (H) 2.0 - 4.0 g/dL A-G Ratio 0.6 (L) 0.8 - 1.7    
CBC W/O DIFF Collection Time: 08/01/18  3:55 AM  
Result Value Ref Range WBC 7.6 4.6 - 13.2 K/uL  
 RBC 3.78 (L) 4.20 - 5.30 M/uL HGB 10.9 (L) 12.0 - 16.0 g/dL HCT 34.7 (L) 35.0 - 45.0 % MCV 91.8 74.0 - 97.0 FL  
 MCH 28.8 24.0 - 34.0 PG  
 MCHC 31.4 31.0 - 37.0 g/dL  
 RDW 15.1 (H) 11.6 - 14.5 % PLATELET 326 345 - 108 K/uL MPV 10.8 9.2 - 11.8 FL  
GLUCOSE, POC Collection Time: 08/01/18  8:12 AM  
Result Value Ref Range Glucose (POC) 138 (H) 70 - 110 mg/dL PROTHROMBIN TIME + INR Collection Time: 08/01/18  9:30 AM  
Result Value Ref Range Prothrombin time 18.6 (H) 11.5 - 15.2 sec INR 1.6 (H) 0.8 - 1.2 CELL COUNT AND DIFF, BODY FLUID Collection Time: 08/01/18 11:01 AM  
Result Value Ref Range BODY FLUID TYPE PLEURAL FLUID FLUID COLOR STRAW    
 FLUID APPEARANCE CLEAR    
 FLUID RBC CT. 30 (A) NRRE /cu mm FLUID NUCLEATED CELLS 120 (A) NRRE /cu mm  
 FLD NEUTROPHILS 10 % FLD BANDS 0 % FLD LYMPHS 20 % FLD MONOCYTES 0 % FLD EOSINS 0 % MACROPHAGE 70 % WBC COMMENTS PLEASE REFER TO CYTOLOGY SPECIMEN. PROTEIN TOTAL, FLUID Collection Time: 08/01/18 11:01 AM  
Result Value Ref Range Fluid Type: PLEURAL FLUID Protein total, body fld. 1.8 g/dL GLUCOSE, FLUID Collection Time: 08/01/18 11:01 AM  
Result Value Ref Range Fluid Type: PLEURAL FLUID Glucose, body fld. 162 MG/DL  
GLUCOSE, POC Collection Time: 08/01/18  1:02 PM  
Result Value Ref Range Glucose (POC) 188 (H) 70 - 110 mg/dL Imaging: 
I have personally reviewed the patients radiographs and have reviewed the reports: CXR Results  (Last 48 hours) 08/01/18 1054  XR CHEST SNGL V Final result Impression:  IMPRESSION: No significant pneumothorax. Bilateral lung base haziness as above. Narrative:  INDICATION:  Status post right thoracentesis. COMPARISON:  Chest radiograph 7/18. FINDINGS: A portable AP radiograph of the chest was obtained at 1041 hours. No  
significant pneumothorax.  Stable enlargement of the cardiac silhouette and mild  
pulmonary vasculature prominence. Bilateral lung base small amount of haziness  
likely a combination of small effusions and overlying  
edema/atelectasis/infiltrate. Possible mild interval decrease in the right-sided  
effusion. No acute osseous abnormality. 7/28/18 CXR- PA/LAT EXAM: Chest Radiographs 
  
INDICATION: Pleural effusion 
  
TECHNIQUE: PA and lateral views of the chest 
  
COMPARISON: 7/26/2018 
  
FINDINGS: No pneumothorax identified. Moderate to large bilateral pleural 
effusions right greater than left are noted. Bibasilar opacities which may 
resent atelectasis or infiltrate. Cardiomegaly is present. The pulmonary 
vascularity is unremarkable. The osseous structures are unremarkable. 
  
IMPRESSION IMPRESSION: 
1. Moderate to large bilateral pleural effusions right greater than left. CT Results  (Last 48 hours) None High complexity decision making was performed during the evaluation of this patient at high risk for decompensation with multiple organ involvement 
  
Above mentioned total time spent on reviewing the case/medical record/data/notes/EMR/patient examination/documentation/coordinating care with nurse/consultants, exclusive of procedures with complex decision making performed and > 50% time spent in face to face evaluation. Nava Morgan DO, FCCP Pulmonary, Sleep, Critical Care Medicine Clinical care, chart review and time spent coordinating care: 20 min

## 2018-08-01 NOTE — PROGRESS NOTES
Problem: Dysphagia (Adult) Goal: *Acute Goals and Plan of Care (Insert Text) Dysphagia Present: yes Aspiration: suspected Recommendations: 
Diet: mechanical soft solids/ nectar thick liquids Meds: whole/ crushed in puree Aspiration Precautions Oral Care TID Goals:  Patient will: 1. Tolerate PO trials with no overt s/s aspiration or distress in 4/5 trials 2. Utilize compensatory swallow strategies/maneuvers (decrease bite/sip, size/rate, alt. liq/sol) with min cues in 4/5 trials 3. Perform oral-motor/laryngeal strengthening exercises with min cues to increase oropharyngeal swallow function 4. Complete an objective swallow study (i.e., MBSS) to assess swallow integrity, r/o aspiration, and determine of safest LRD, min A Outcome: Progressing Towards Goal 
Speech language pathology dysphagia treatment Patient: Richard Cool (45 y.o. female) Date: 8/1/2018 Diagnosis: HCAP (healthcare-associated pneumonia) <principal problem not specified> Precautions: aspiration, Fall, Skin ASSESSMENT: 
Pt seen b/s for dysphagia tx. Pt more awake and alert since this SLP's last visit. Pt accepted trials thin, mechanical soft solids, puree and nectar thick liquids. Pt demo moderately prolonged mastication of mechanical soft solids, mildly prolonged oral transit of puree, good oral clearing following solids, mildly delayed swallow response with decreased laryngeal elevation. Pt tolerated single straw sips thin solo without overt s/sx aspiration, however, pt did present with cough response when thin liquids used as liquid wash. No overt s/sx aspiration following straw sips nectar, puree or mechanical soft solids. Rec: advance solids to mechanical soft, continue nectar thick liquids, aspiration precautions, MBS tomorrow to r/o aspiration and determine swallow function. SLP will continue to follow. Progression toward goals: 
[x]         Improving appropriately and progressing toward goals [] Improving slowly and progressing toward goals 
[]         Not making progress toward goals and plan of care will be adjusted PLAN: 
Recommendations and Planned Interventions: 
advance solids to mechanical soft, continue nectar thick liquids, aspiration precautions, MBS tomorrow to r/o aspiration and determine swallow function. Patient continues to benefit from skilled intervention to address the above impairments. Continue treatment per established plan of care. Discharge Recommendations:  [place] and To Be Determined SUBJECTIVE:  
Patient stated I didn't notice that I cough. OBJECTIVE:  
Cognitive and Communication Status: 
Neurologic State: Alert Orientation Level: Oriented to person Cognition: Follows commands Perception: Appears intact Perseveration: No perseveration noted Safety/Judgement: Fall prevention Dysphagia Treatment: 
Oral Assessment: 
Oral Assessment Labial: Decreased rate Dentition: Natural 
Oral Hygiene: good Lingual: Decreased rate Velum: Unable to visualize Mandible: No impairment P.O. Trials: 
 Patient Position: HOB 60* Vocal quality prior to P.O.: No impairment Consistency Presented: Thin liquid, Puree, Nectar thick liquid How Presented: SLP-fed/presented, Straw, Successive swallows, Spoon Bolus Acceptance: No impairment Bolus Formation/Control: Impaired Type of Impairment: Mastication Propulsion: Delayed (# of seconds) Oral Residue: None Initiation of Swallow: Delayed (# of seconds) Laryngeal Elevation: Decreased Aspiration Signs/Symptoms: Change vocal quality, Clear throat, Weak cough Pharyngeal Phase Characteristics: Audible swallow, Poor endurance, Suspected pharyngeal residue, Double swallow Effective Modifications: Alternate liquids/solids, Small sips and bites Cues for Modifications: Moderate Oral Phase Severity: Mild Pharyngeal Phase Severity : Mild PAIN: 
Start of Tx: 0 End of Tx: 0 After treatment:  
[]              Patient left in no apparent distress sitting up in chair 
[x]              Patient left in no apparent distress in bed 
[x]              Call bell left within reach [x]              Nursing notified 
[]              Family present 
[]              Caregiver present 
[]              Bed alarm activated COMMUNICATION/EDUCATION:  
[x] Aspiration precautions; swallow safety; compensatory techniques []        Patient unable to participate in education; education ongoing with staff [x]  Posted safety precautions in patient's room. [] Oral-motor/laryngeal strengthening exercises Yolis Deleon MS, CCC/SLP Time Calculation: 18 mins

## 2018-08-01 NOTE — CDMP QUERY
Per Dr. Felicity Manning note on 7/31, \"She does have what appears to be acute on chronic diastolic heart failure. She will likely benefit from scheduled Lasix upon discharge. \" If you agree with this assessment, please document in the progress notes. Please clarify and document your clinical opinion in the progress notes and discharge summary including the definitive and/or presumptive diagnosis, (suspected or probable), related to the above clinical findings. Please include clinical findings supporting your diagnosis. If you DECLINE this query or would like to communicate with [facility], please utilize the \"[facility] message box\" at the TOP of the Progress Note on the right. Thank you, Mike Hawkins [facility], [id number] [place]

## 2018-08-01 NOTE — PROCEDURES
Hospice follow-up visit, patient sleeping in bed post IR thoracentesis earlier today, 450 ml of fluid removed, on 4L of oxygen. Michael Ang (sister) at bedside, stated patient has been sleeping most of the day. Spoke to Luis Garcia, on phone. Updated on follow-up visit, further explained patient would need to be evaluated again tomorrow after the medication from thoracentesis has worn off. Mary Kate Henriquez requested to be called 30 minute prior to seeing patient so she could be present ([phone].) Joye Shone, RN

## 2018-08-02 NOTE — PROGRESS NOTES
Problem: Falls - Risk of 
Goal: *Absence of Falls Document Monica Freitas Fall Risk and appropriate interventions in the flowsheet. Outcome: Progressing Towards Goal 
Fall Risk Interventions: 
Mobility Interventions: PT Consult for mobility concerns, PT Consult for assist device competence Mentation Interventions: Bed/chair exit alarm, Door open when patient unattended, Evaluate medications/consider consulting pharmacy, Eyeglasses and hearing aids, Update white board, More frequent rounding Medication Interventions: Evaluate medications/consider consulting pharmacy Elimination Interventions: Call light in reach, Patient to call for help with toileting needs, Toileting schedule/hourly rounds History of Falls Interventions: Room close to nurse's station, Evaluate medications/consider consulting pharmacy, Door open when patient unattended, Consult care management for discharge planning

## 2018-08-02 NOTE — PROGRESS NOTES
Problem: Self Care Deficits Care Plan (Adult) Goal: *Acute Goals and Plan of Care (Insert Text) Occupational Therapy Goals Initiated 8/2/2018 within 7 day(s). 1.  Patient will perform grooming with contact guard assist  
2. Patient will perform self-feeding with minimal assistance/contact guard assist. 
3.  Patient will perform toilet transfers with moderate assistance x 2. 
4.  Patient will perform sitting edge of bed with F balance in order to prepare for ADLs. Occupational Therapy EVALUATION Patient: Wilfredo Amor (13 y.o. female) Date: 8/2/2018 Primary Diagnosis: HCAP (healthcare-associated pneumonia) Precautions:   Aspiration, Fall ASSESSMENT : 
Based on the objective data described below, the patient presents with decreased activity tolerance, bed mobility, and ability to participate in self care tasks. Pt approached for OT laying supine after a few minutes able to keep eyes open. Pt alert and oriented to person. Pt performed oral care mod A, drinking from cup s/u. Pt performed rolling R and L with max A for [place] area care. Pt's daughter stated she has needed assist for feeding x 4 weeks and previously was toileting self. Patient will benefit from skilled intervention to address the above impairments. Patients rehabilitation potential is considered to be Fair Factors which may influence rehabilitation potential include:  
[]             None noted [x]             Mental ability/status []             Medical condition []             Home/family situation and support systems []             Safety awareness []             Pain tolerance/management 
[]             Other: PLAN : 
Recommendations and Planned Interventions: 
[x]               Self Care Training                  [x]        Therapeutic Activities [x]               Functional Mobility Training    []        Cognitive Retraining 
[x]               Therapeutic Exercises           [x]        Endurance Activities [x]               Balance Training                   [x]        Neuromuscular Re-Education []               Visual/Perceptual Training     []   Home Safety Training 
[x]               Patient Education                 [x]        Family Training/Education []               Other (comment): Frequency/Duration: Patient will be followed by occupational therapy 1-2 times per day/4-7 days per week to address goals. Discharge Recommendations: Mykel Tariq Further Equipment Recommendations for Discharge: N/A Barriers to Learning/Limitations: yes;  cognitive Compensate with: visual, verbal, tactile, kinesthetic cues/model PATIENT COMPLEXITY Eval Complexity: History: MEDIUM Complexity : Expanded review of history including physical, cognitive and psychosocial  history ; Examination: MEDIUM Complexity : 3-5 performance deficits relating to physical, cognitive , or psychosocial skils that result in activity limitations and / or participation restrictions; Decision Making:MEDIUM Complexity : Patient may present with comorbidities that affect occupational performnce. Miniml to moderate modification of tasks or assistance (eg, physical or verbal ) with assesment(s) is necessary to enable patient to complete evaluation  Assessment: moderate Complexity G-CODES:  
 
Self Care  Current  CL= 60-79%  Goal  CK= 40-59%. The severity rating is based on the Level of Assistance required for Functional Mobility and ADLs. SUBJECTIVE:  
Patient stated Well stop talking and roll me.  OBJECTIVE DATA SUMMARY:  
 
Past Medical History:  
Diagnosis Date  Anemia NEC  Arthritis  Arthropathy, unspecified, site unspecified  Asthma  Atrial fibrillation ([facility] [address] CHRONIC A-FIB  Diabetes mellitus  Essential hypertension  Heart abnormalities   
 irregular heartbeat  Hypertension  Liver disease   
 steatohepatitis  Neurological disorder   
 dementia  Osteoarthritis LEFT HIP Past Surgical History:  
Procedure Laterality Date  HX BUNIONECTOMY  HX HIP REPLACEMENT  11/2014  HX OTHER SURGICAL  5/27/12  
 colon mass removed  HX TUBAL LIGATION Prior Level of Function/Home Situation: Pt previously max to TD for dressing and bathing and SUP/MI for toileting per daughter. Pt uses w/c for mobility. Previously I in grooming and self feeding. Home Situation Home Environment: Assisted living Care Facility Name: [address] [place] [address] One/Two Story Residence: One story Living Alone: No 
Support Systems: Assisted living, Family member(s) Patient Expects to be Discharged to[de-identified] Assisted living Current DME Used/Available at Home: Wheelchair Tub or Shower Type: Shower 
[]  Right hand dominant   [x]  Left hand dominant Cognitive/Behavioral Status: 
Neurologic State: Alert Orientation Level: Oriented to person Cognition: Decreased attention/concentration;Memory loss;Decreased command following Safety/Judgement: Decreased insight into deficits; Fall prevention Skin: bruises BUEs Edema: none notes BUEs Vision/Perceptual:   
   glasses Coordination: 
  
Fine Motor Skills-Upper: Left Impaired;Right Impaired Gross Motor Skills-Upper: Left Impaired;Right Impaired Balance: 
Sitting: Impaired; With support Sitting - Static: Fair (occasional) Sitting - Dynamic: Fair (occasional) Standing: Impaired; With support Standing - Static: Poor Strength: Julia Nyhaham Strength: Generally decreased, functional 
Tone & Sensation: [place] Tone: Normal 
Sensation: Intact Range of Motion: Julia Nyhaham AROM: Generally decreased, functional 
PROM: Within functional limits Functional Mobility and Transfers for ADLs: 
Bed Mobility: 
Rolling: Maximum assistance ADL Assessment: 
Feeding: Maximum assistance Oral Facial Hygiene/Grooming: Moderate assistance Bathing: Total assistance Upper Body Dressing: Maximum assistance Lower Body Dressing: Total assistance Toileting: Total assistance ADL Intervention: 
Cognitive Retraining Safety/Judgement: Decreased insight into deficits; Fall prevention Pain: 
Pt reports 0/10 pain or discomfort prior to treatment.   
Pt reports 0/10 pain or discomfort post treatment. Activity Tolerance:  
Fair - to poor Please refer to the flowsheet for vital signs taken during this treatment. After treatment:  
[] Patient left in no apparent distress sitting up in chair 
[x] Patient left in no apparent distress in bed 
[x] Call bell left within reach 
[] Nursing notified 
[] Caregiver present 
[] Bed alarm activated COMMUNICATION/EDUCATION:  
[x] Home safety education was provided and the patient/caregiver indicated understanding. [x] Patient/family have participated as able in goal setting and plan of care. [x] Patient/family agree to work toward stated goals and plan of care. [] Patient understands intent and goals of therapy, but is neutral about his/her participation. [] Patient is unable to participate in goal setting and plan of care. Thank you for this referral. 
Arsen Galvan OT Time Calculation: 25 mins

## 2018-08-02 NOTE — HOSPICE
Hospice follow up: Met with the patient's daughter Yue Tang who is also the patient's POA to discuss hospice services. Had an in depth conversation regarding what benefits would be covered under hospice as well as the goals of care/ hospice philosophy. She stated that she would have a conversation with her siblings this evening about options for the patient upon discharge. 1600 Telephone call to [facility] to coordinate care. Spoke with a nurse John Villa). She stated that the patient already has a hospital bed with a mat on the floor but she doesn't know the vendor. Pt will need an over the bed table, oxygen, suction machine as well as personal care supplies. Whit Reece also stated that their  would have to come and do an assessment on the patient prior to the patient's return because the patient had been out of their facility for over 3 days. Paged Yue Tang CM to inform her of the need for 's assessment. Daughter is aware that an assessment is needed from .

## 2018-08-02 NOTE — PROGRESS NOTES
This CM talked with patient's daughter Meghan Hamilton, and she stated that she is agreeable to [facility]. Talked with [address] [place] [address] and informed them that plan is to d/c patient back there tomorrow with hospice. Talked with Chato Lau at [facility] and she stated that their Medical Director did state that patient does meet medical criteria for hospice. Informed Chato Jasminhayden of the discussion with patient's daughter and her decision to go with [facility]. Will arrange transport once d/c orders are received tomorrow, and will inform patient's daughter Meghan Hamilton of transport time. Will continue to follow and assist with d/c planning for return to assisted living with hospice. LIZ OrtizW, Care Manager.

## 2018-08-02 NOTE — PROGRESS NOTES
[facility] Hospitalist Group Progress Note Patient: Savannah Duggan Age: 66 y.o. : 1940 MR#: [id number] SSN: [id number] Date/Time: 2018 Subjective:  
 
Feels better today. Cough improving and breathing better since thoracentesis. States she's \"starving. \" hopes she can go back to [facility] soon. Daughter / Nell Glover arrives, talking with [facility]. Daughter states she does not want SNF for her Mom as she's spent several weeks in rehab in the past and patient did not retain the information. Daughter signs durable DNR, copy given to her. Unclear at this point if dispo is hospice, [facility] is checking into it. Called later by nsg regarding sugar 488. Assessment/Plan:  
-Acute metabolic encephalopathy multifactorial and improving. -HCAP on vanc and zosyn, pulmonology following, discussed plan of care w// pulmonologist, plans to de escalate abx in the near future. 
-Chronic diastolic HF compensated cardiology following, agree w/ holding diuretics in light of elevated creat. -ARF resolving.  
-Right greater than left pleural effusion-pulmonary following - transudate - resume eliquis.  
-Impaired mobility-? Due to de conditioning, PT/OT  
-History of moderate to severe MR and wide open TR . She is not surgical candidate.  
-history of chronic afib on eliquis, to be held in preparation for thoracentesis. -history of Parkinson's disease 
-history of pulm HTN, COPD-stable 
-recent admission to a [facility] for management of acute on chronic combined systolic and diastolic HF. 
- DM2 with hyperglycemia - lantus / ssi / ADA diet. - DNR / durable DNR signed. Greater than 30 minutes spent with patient and family at bedside, in discussion with daughter, and in coordination of care of this patient. Additional Notes:   
 
Case discussed with:  [x]Patient  [x]Family  [x]Nursing  [x]Case Management DVT Prophylaxis:  []Lovenox []Hep SQ  []SCDs  []Coumadin   []On Heparin gtt    [x] eliquis Objective:  
VS:  
Visit Vitals  /76 (BP 1 Location: Left arm, BP Patient Position: At rest)  Pulse (!) 49  Temp 98.3 °F (36.8 °C)  Resp 18  Ht 5' 5\" (1.651 m)  Wt 93.5 kg (206 lb 1.6 oz)  SpO2 99%  Breastfeeding No  
 BMI 34.3 kg/m2 Tmax/24hrs: Temp (24hrs), Av.6 °F (36.4 °C), Min:96.7 °F (35.9 °C), Max:98.3 °F (36.8 °C) Input/Output:  
 
Intake/Output Summary (Last 24 hours) at 18 1217 Last data filed at 18 6091 Gross per 24 hour Intake              120 ml Output             1050 ml Net             -930 ml General: awake and alert, oriented to name. Difficulty lying flat when moved up in bed. HEENT ncat. Perrl Cardiovascular: rrr, no murmurs Pulmonary: ctab GI:  Soft, nt, nd 
Extremities:  No edema Additional:  No rash Labs:   
Recent Results (from the past 24 hour(s)) GLUCOSE, POC Collection Time: 18  1:02 PM  
Result Value Ref Range Glucose (POC) 188 (H) 70 - 110 mg/dL GLUCOSE, POC Collection Time: 18  3:05 PM  
Result Value Ref Range Glucose (POC) 166 (H) 70 - 110 mg/dL GLUCOSE, POC Collection Time: 18  4:01 PM  
Result Value Ref Range Glucose (POC) 171 (H) 70 - 110 mg/dL GLUCOSE, POC Collection Time: 18  9:59 PM  
Result Value Ref Range Glucose (POC) 202 (H) 70 - 110 mg/dL METABOLIC PANEL, BASIC Collection Time: 18  3:19 AM  
Result Value Ref Range Sodium 143 136 - 145 mmol/L Potassium 5.0 3.5 - 5.5 mmol/L Chloride 115 (H) 100 - 108 mmol/L  
 CO2 21 21 - 32 mmol/L Anion gap 7 3.0 - 18 mmol/L Glucose 158 (H) 74 - 99 mg/dL BUN 14 7.0 - 18 MG/DL Creatinine 1.27 0.6 - 1.3 MG/DL  
 BUN/Creatinine ratio 11 (L) 12 - 20 GFR est AA 49 (L) >60 ml/min/1.73m2 GFR est non-AA 41 (L) >60 ml/min/1.73m2 Calcium 8.5 8.5 - 10.1 MG/DL  
GLUCOSE, POC  Collection Time: 18  7:27 AM Result Value Ref Range Glucose (POC) 134 (H) 70 - 110 mg/dL GLUCOSE, POC Collection Time: 08/02/18 11:51 AM  
Result Value Ref Range Glucose (POC) 196 (H) 70 - 110 mg/dL Additional Data Reviewed:   
 
Signed By: Christiano Vincent MD   
 August 2, 2018 1:27 PM

## 2018-08-02 NOTE — PROGRESS NOTES
Problem: Dysphagia (Adult) Goal: *Acute Goals and Plan of Care (Insert Text) Dysphagia Present: yes Aspiration: none Recommendations: 
Diet: mechanical soft solids/thin liquids Meds: as tolerated Aspiration Precautions Oral Care TID Goals:  Patient will: 1. Tolerate PO trials with no overt s/s aspiration or distress in 4/5 trials - met 2. Utilize compensatory swallow strategies/maneuvers (decrease bite/sip, size/rate, alt. liq/sol) with min cues in 4/5 trials - met 3. Perform oral-motor/laryngeal strengthening exercises with min cues to increase oropharyngeal swallow function  - n/a 
4. Complete an objective swallow study (i.e., MBSS) to assess swallow integrity, r/o aspiration, and determine of safest LRD, min A - met 8/2/18 Outcome: Resolved/Met Date Met: 08/02/18 Speech Pathology Modified barium swallow Study/treatment and discharge Patient: Anthony Sanchez (79 y.o. female) Date: 8/2/2018 Primary Diagnosis: HCAP (healthcare-associated pneumonia) Precautions:   Aspiration, Fall ASSESSMENT : 
Based on the objective data described below, the patient presents with oropharyngeal swallow fxn essentially [facility]. Pt tolerated reg solid, puree, nectar-thick, thin liquid +/- straw, and 13 mm Ba pill with thin liquid wash without aspiration/penetration events. Bolus manipulation, tongue base retraction, laryngeal elevation, and overall pharyngeal motility/sensation appeared functional and timely throughout study. Positive oropharyngeal clearance appreciated. Pt safe for [facility] soft diet with thin liquids, aspiration precautions, oral care TID, and meds as tolerated. No further skilled SLP services are indicated at this time. Please re-consult if needed. PLAN : 
Recommendations and Planned Interventions: See above Frequency/Duration: x eval/tx only Discharge Recommendations: To Be Determined SUBJECTIVE:  
Patient stated That's good. OBJECTIVE:  
 
Past Medical History: Diagnosis Date  Anemia NEC  Arthritis  Arthropathy, unspecified, site unspecified  Asthma  Atrial fibrillation ([facility] [address] CHRONIC A-FIB  Diabetes mellitus  Essential hypertension  Heart abnormalities   
 irregular heartbeat  Hypertension  Liver disease   
 steatohepatitis  Neurological disorder   
 dementia  Osteoarthritis LEFT HIP Past Surgical History:  
Procedure Laterality Date  HX BUNIONECTOMY  HX HIP REPLACEMENT  11/2014  HX OTHER SURGICAL  5/27/12  
 colon mass removed  HX TUBAL LIGATION Prior Level of Function/Home Situation: CAMPOS Home Situation Home Environment: Assisted living Care Facility Name: [address] [place] [address] One/Two Story Residence: One story Living Alone: No 
Support Systems: Assisted living, Family member(s) Patient Expects to be Discharged to[de-identified] Assisted living Current DME Used/Available at Home: Wheelchair Tub or Shower Type: Shower Diet prior to admission: mech soft with nectar-thick per SLP recs Current Diet:  mech soft with thin Radiologist:   
Film Views: Fluoro;Lateral 
Patient Position: 90 in fluoro chair Trial 1:  
Consistency Presented: Thin liquid; Solid;Puree;Pill/Tablet How Presented: Self-fed/presented;Cup/sip;Spoon;Straw Bolus Acceptance: No impairment Bolus Formation/Control: No impairment:    
Propulsion: No impairment Oral Residue: None Initiation of Swallow: No impairment Timing: No impairment Penetration: None Aspiration/Timing: No evidence of aspiration Pharyngeal Clearance: No residue Attempted Modifications: Small sips and bites Effective Modifications: None Cues for Modifications: None Decreased Tongue Base Retraction?: No 
Laryngeal Elevation: WFL (within functional limits) Aspiration/Penetration Score: 1 (No penetration or aspiration-Contrast does not enter the airway) Pharyngeal Symmetry: Not assessed Pharyngeal-Esophageal Segment: No impairment Pharyngeal Dysfunction: None Oral Phase Severity: No impairment Pharyngeal Phase Severity: N/A Treatment Performed Following Evaluation: 
Training and education provided related to anatomy/physiology of oropharyngeal swallow, diet recs, compensatory strategy use (small sips, alternating bites/sips, double swallow per bite/sip) and positioning. Pt able to verbalize understanding. GCODESwallowing:  Swallow Current Status CI= 1-19%  Swallow Goal Status CI= 1-19%  Swallow D/C Status CI= 1-19% The severity rating is based on the following outcomes:   
8-point Penetration-Aspiration Scale: Score 1 Professional Judgment PAIN: 
Pt reports 0/10 pain or discomfort prior to MBS. Pt reports 0/10 pain or discomfort post MBS. COMMUNICATION/EDUCATION:  
[x]  Patient educated regarding MBS results and diet recommendations. [x]  Patient/family have participated as able in goal setting and plan of care. Thank you for this referral. 
 
Duncan Hatchet, M.S. CCC-SLP/L Speech-Language Pathologist

## 2018-08-02 NOTE — PROGRESS NOTES
[facility] Pulmonary, Critical Care, and Sleep Medicine Initial Patient Follow Up Name: Ronni Garibay MRN: [id number] : 1940 Hospital: [address] [place] [address]  Date: 2018 IMPRESSION:  
-Acute metabolic encephalopathy- toxic/metabolic superimposed on dementia. -Healthcare-associated pneumonia- started on broad-spectrum antibiotics for now.  
 
-Acute on chronic renal failure- most likely aggravated by diruesis - BUN/Creat:18 
 
-Chronic diastolic heart failure-Preserved ejection fraction on recent echocardiogram.  Moderate to severe mitral regurgitation, wide-open tricuspid regurgitation. Chronic atrial fibrillation.   
 
-Right greater than left pleural effusions- chronic diastolic heart failure and poor cough with basal atelectasis s/p IR thoracentesis earlier today- By Light's Criteria- Protein: appears transudative which is more consistent with CHF. Other labs still pending. 
 
-Parkinson's,- under the care of Dr. Julian Rangel. -Morbid obesity. 
 
-Ambulatory dysfunction. She was walking a couple of months ago, but no longer. 
 
-Deep venous thrombosis prophylaxis. -on Eliquis. SHE IS A FULL CODE. RECOMMENDATIONS:  
· Oxygen- titrate to SaO2 goal > 90% · CXR- AP/LAT in am- still pending · Bronchial hygiene protocol · Bronchodilators prn · Aspiration precautions · Diuresis per primary team 
· Restart Eliquis · Continue antibiotics per primary team.- Consider changing to doxycycline to complete today 5 day course · Follow up on pending results of fluid  analysis from thoracentesis · Continue work up for altered mental status · Assess home Oxygen needs at discharge · OT, PT, OOB and ambulate · Other medical management per primary team and respective consultants · Anticipate D/C in near future. Subjective: This patient has been seen and evaluated at the request of Dr. Nelva Buerger for [facility]. History obtained from chart review- patient not conversant. Patient is a 66 y.o. female This is a 30-year-old -American female with a past medical history of Parkinson disease, recent admission for CHF at [facility], she has an EF of 51-55%, moderate to severe MR and wide-open TR with pulmonary hypertension. She also has chronic atrial fibrillation, maintained on Eliquis. Her cardiologist is in [place]. During her admission at [facility] she was diuresed. She had returned to [facility]. The daughter at the bedside provides most of the history and she states that the patient is not a candidate for rehab because she does not recall the exercises and what she was taught there. She was brought into the ED sent by [facility] for altered mental status. In the ED, she was noted to have a potassium of 2.7 and a magnesium of 1.2. She has bilateral pleural effusions, right greater than left, with likely bilateral infiltrates as well as atelectasis. Her daughter relates that they had appealed her discharge at [facility], but she ultimately had been discharged back to [facility]. She feels that the patient needed more time in the hospital.  There was report of fever from [facility], a scant cough. The patient described pain in her legs and cramping. There was no nausea or vomiting, but there was a report of decreased appetite. No report of foul odor to her urine. She did have some diarrhea during her recent hospital stay. At her baseline, she walks a little. She was walking just a couple of months ago. The daughter also states that at her baseline mental status, she knows her date of birth, but she does not know the day of the week or the year. No further review of systems is obtainable related to the patient's altered mental status. In the ED, she received Zosyn as well as Tylenol, magnesium sulfate 4 grams IV x1, three runs of potassium.   She also received a 1-liter bolus of normal saline as well as vancomycin. 7/30/18- Patient has two twin, adult daughters. I spoke with once daughter today. More difficult to diurese patient due to increased BUN/Creat. Has persistent effusions but on Eliquis. We discussed possible US- guided thoracentesis. Family discussing possible hospice. Both sisters will discuss possible thoracentesis together. Will hold Eliquis today in case daughters wish to pursue thoracentesis. Pending clinical course will streamline antibiotics tomorrow. 08/02/18 
-Patient resting in bed, On 2LPM- SpO2: 98%,  More alert and awake today - No chest pain, cough or sputum production - Afebrile - WBC normal 
- CXR pending - pleural fluid- cytology pending, cultures pending Patient Vitals for the past 24 hrs: 
 Temp Pulse Resp BP SpO2  
08/02/18 0727 97.9 °F (36.6 °C) (!) 55 18 157/75 100 % 08/02/18 0339 97.4 °F (36.3 °C) (!) 54 18 154/78 99 % 08/02/18 0028 96.7 °F (35.9 °C) (!) 56 18 157/81 93 % 08/01/18 2051 96.9 °F (36.1 °C) 62 18 159/81 100 % 08/01/18 1503 98.1 °F (36.7 °C) (!) 48 18 157/73 99 % Past Medical History:  
Diagnosis Date  Anemia NEC  Arthritis  Arthropathy, unspecified, site unspecified  Asthma  Atrial fibrillation ([facility] [address] CHRONIC A-FIB  Diabetes mellitus  Essential hypertension  Heart abnormalities   
 irregular heartbeat  Hypertension  Liver disease   
 steatohepatitis  Neurological disorder   
 dementia  Osteoarthritis LEFT HIP Past Surgical History:  
Procedure Laterality Date  HX BUNIONECTOMY  HX HIP REPLACEMENT  11/2014  HX OTHER SURGICAL  5/27/12  
 colon mass removed  HX TUBAL LIGATION Prior to Admission medications Medication Sig Start Date End Date Taking? Authorizing Provider  
acetaminophen (TYLENOL) 325 mg tablet Take 2 Tabs by mouth every six (6) hours as needed for Pain or Fever.  Indications: Arthritic Pain, Fever 6/26/18   Nicki Mercado MD  
albuterol (PROVENTIL HFA, VENTOLIN HFA, PROAIR HFA) 90 mcg/actuation inhaler Take 1 Puff by inhalation every six (6) hours as needed (Asthma/cough). Indications: Acute Asthma Attack 6/26/18   Monster Lobato MD  
albuterol (PROVENTIL VENTOLIN) 2.5 mg /3 mL (0.083 %) nebulizer solution 3 mL by Nebulization route every four (4) hours as needed for Wheezing. Indications: Chronic Obstructive Pulmonary Disease 6/26/18   Monster Lobato MD  
amiodarone (CORDARONE) 200 mg tablet Take 1 Tab by mouth daily. Indications: Cardioversion of Atrial Fibrillation 6/26/18   Monster Lobato MD  
apixaban (ELIQUIS) 5 mg tablet Take 1 Tab by mouth two (2) times a day. Indications: Cerebral Thromboembolism Prevention 6/26/18   Monster Lobato MD  
aspirin delayed-release 81 mg tablet Take 1 Tab by mouth daily. Indications: CAD 6/26/18   Monster Lobato MD  
cholecalciferol (VITAMIN D3) 1,000 unit cap Take 2 Caps by mouth daily. Daughter is unsure about the dosage. Indications: Osteoporosis, Vitamin D Deficiency 6/26/18   Monster Lobato MD  
famotidine (PEPCID) 20 mg tablet Take 1 Tab by mouth nightly. Indications: gastroesophageal reflux disease 6/26/18   Monster Lobato MD  
FLUoxetine (PROZAC) 20 mg tablet Take 2 Tabs by mouth nightly. Indications: ANXIETY WITH DEPRESSION 6/26/18   Monster Lobato MD  
furosemide (LASIX) 20 mg tablet Take 1 Tab by mouth daily as needed. Indications: Edema 6/26/18   Monster Lobato MD  
glipiZIDE SR (GLUCOTROL XL) 10 mg CR tablet Take 1 Tab by mouth two (2) times a day. 1 in the morning and 1 at bedtime. Indications: type 2 diabetes mellitus 6/26/18   Monster Lobato MD  
insulin lispro (HUMALOG) 100 unit/mL injection SubCUTAneous route Before breakfast, lunch, dinner and at bedtime. For Blood Sugar (mg/dL) of:    
Less than 150 =   0 units 150 -199 = 2 units 200 -249 =   4 units 250 -299 =   6 units 300 -349 =   8 units 350 and above =  10 units  Indications: type 2 diabetes mellitus 6/26/18   Alesha De Anda MD  
losartan (COZAAR) 25 mg tablet Take 1 Tab by mouth daily. Indications: hypertension 6/26/18   Alesha De Anda MD  
Menthol-Zinc Oxide (CALMOSEPTINE) 0.44-20.6 % oint Apply 1 g to affected area three (3) times daily as needed (For rash, apply to inner buttocks). Indications: Diaper Rash 6/26/18   Alesha De Anda MD  
metFORMIN (GLUCOPHAGE) 1,000 mg tablet Take 1 Tab by mouth two (2) times daily (with meals). Indications: type 2 diabetes mellitus 6/26/18   Alesha De Anad MD  
metoprolol tartrate (LOPRESSOR) 25 mg tablet Take 1 Tab by mouth every twelve (12) hours. Indications: VENTRICULAR RATE CONTROL IN ATRIAL FIBRILLATION 6/26/18   Alesha De Anda MD  
[facility] 220 mcg (120 doses) aepb inhaler Take 1 Puff by inhalation daily. Powder that patient inhales. 220mcg. Indications: MAINTENANCE THERAPY FOR ASTHMA 6/26/18   Alesha De Anda MD  
montelukast (SINGULAIR) 10 mg tablet Take 1 Tab by mouth daily. Indications: MAINTENANCE THERAPY FOR ASTHMA 6/26/18   Alesha De Anda MD  
polyethylene glycol (MIRALAX) 17 gram/dose powder Take 17 g by mouth daily as needed. Indications: constipation 6/26/18   Alesha De Anda MD  
prednisoLONE acetate (PRED FORTE) 1 % ophthalmic suspension Administer 1 Drop to left eye two (2) times a day. Please verify with  Patient's daughter the percentage. Thanks,   Indications: PREVENTION OF CYTARABINE-INDUCED KERATOCONJUNCTIVITIS 6/26/18   Alesha De Anda MD  
QUEtiapine (SEROQUEL) 25 mg tablet Take 0.5 Tabs by mouth two (2) times a day. Indications: Generalized Anxiety Disorder 6/26/18   Alesha De Anda MD  
risperiDONE (RISPERDAL) 1 mg tablet Take 1 Tab by mouth nightly. Indications: BIPOLAR DISORDER IN REMISSION, DEPRESSION ASSOCIATED WITH BIPOLAR DISORDER 6/26/18   Benitez Blair MD  
simvastatin (ZOCOR) 10 mg tablet Take 1 Tab by mouth nightly. Indications: hypertriglyceridemia 6/26/18   Benitez Blair MD  
risperiDONE (RISPERDAL) 1 mg tablet Take 1 Tab by mouth nightly. 6/24/18   Michael Rogers MD  
 
Allergies Allergen Reactions  Compazine [Prochlorperazine Edisylate] Other (comments) Twitching, seizure-like symptoms Social History Substance Use Topics  Smoking status: Never Smoker  Smokeless tobacco: Never Used  Alcohol use No  
  
Family History Problem Relation Age of Onset  Hypertension Mother  Diabetes Father  Diabetes Daughter Current Facility-Administered Medications Medication Dose Route Frequency  apixaban (ELIQUIS) tablet 5 mg  5 mg Oral Q12H  
 doxycycline (VIBRAMYCIN) capsule 100 mg  100 mg Oral Q12H  
 metoprolol tartrate (LOPRESSOR) tablet 12.5 mg  12.5 mg Oral Q12H  
 lactulose (CHRONULAC) solution 20 g  20 g Oral BID  insulin lispro (HUMALOG) injection   SubCUTAneous AC&HS  insulin glargine (LANTUS) injection 4 Units  4 Units SubCUTAneous QHS  FLUoxetine (PROzac) capsule 20 mg  20 mg Oral DAILY  QUEtiapine (SEROquel) tablet 12.5 mg  12.5 mg Oral QHS  risperiDONE (RisperDAL) tablet 0.5 mg  0.5 mg Oral QHS  magnesium oxide (MAG-OX) tablet 400 mg  400 mg Oral DAILY  aspirin delayed-release tablet 81 mg  81 mg Oral DAILY  cholecalciferol (VITAMIN D3) tablet 2,000 Units  2,000 Units Oral DAILY  famotidine (PEPCID) tablet 20 mg  20 mg Oral QHS  prednisoLONE acetate (PRED FORTE) 1 % ophthalmic suspension 1 Drop  1 Drop Left Eye BID  simvastatin (ZOCOR) tablet 10 mg  10 mg Oral QHS Review of Systems: 
Review of systems not obtained due to patient factors. Objective:  
Vital Signs:   
Visit Vitals  /75 (BP 1 Location: Left arm, BP Patient Position: At rest)  Pulse (!) 55  Temp 97.9 °F (36.6 °C)  Resp 18  Ht 5' 5\" (1.651 m)  Wt 93.5 kg (206 lb 1.6 oz)  SpO2 100%  Breastfeeding No  
 BMI 34.3 kg/m2 O2 Device: Room air O2 Flow Rate (L/min): 2 l/min Temp (24hrs), Av.4 °F (36.3 °C), Min:96.7 °F (35.9 °C), Max:98.1 °F (36.7 °C) Intake/Output:  
Last shift:        
Last 3 shifts:  1901 -  0700 In: 120 [P.O.:120] Out: 1700 [Urine:1700] Intake/Output Summary (Last 24 hours) at 18 1144 Last data filed at 18 1829 Gross per 24 hour Intake              120 ml Output             1050 ml Net             -930 ml Physical Exam:  
General:  Appears stated age, resting comfortable in bed Head:  Normocephalic, without obvious abnormality, atraumatic. Eyes:  Conjunctivae/corneas clear. PERRL, EOMs intact. Nose: Nares normal. Septum midline. Mucosa normal. No drainage or sinus tenderness. Throat: Lips, mucosa, and tongue normal.- No exudates Neck: Supple, symmetrical, trachea midline, no adenopathy, thyroid: no enlargment/tenderness/nodules, no carotid bruit. Back:   Symmetric, no curvature. ROM normal.  
Lungs:   Clear to auscultation bilaterally. - improved at bases, no wheezing or rhonchi- no accessory muscle use, cough with deep inspiration Chest wall:  No tenderness or deformity. Heart:  Regular rate and rhythm, S1, S2 normal, + murmur, No click, rub or gallop. Abdomen:   Soft, non-tender. Bowel sounds normal. No masses,  No organomegaly. Extremities: Extremities normal, atraumatic, no cyanosis or edema. Pulses: 2+ and symmetric all extremities. Skin: Skin color, texture, turgor normal. No rashes or lesions Lymph nodes: Cervical, supraclavicular nodes, non-palpable Neurologic: Grossly nonfocal  
 
Data review:  
 
 
Lab Results Component Value Date/Time  BNP 88.0 2016 10:30 AM  
 NT pro-BNP 5451 (H) 2018 09:12 AM  
 NT pro-BNP 6620 (H) 07/26/2018 01:50 PM  
 NT pro- 08/08/2017 06:45 PM  
 NT pro-BNP 1646.8 (H) 02/28/2017 05:33 PM  
 
 
 
Recent Results (from the past 24 hour(s)) GLUCOSE, POC Collection Time: 08/01/18  1:02 PM  
Result Value Ref Range Glucose (POC) 188 (H) 70 - 110 mg/dL GLUCOSE, POC Collection Time: 08/01/18  3:05 PM  
Result Value Ref Range Glucose (POC) 166 (H) 70 - 110 mg/dL GLUCOSE, POC Collection Time: 08/01/18  4:01 PM  
Result Value Ref Range Glucose (POC) 171 (H) 70 - 110 mg/dL GLUCOSE, POC Collection Time: 08/01/18  9:59 PM  
Result Value Ref Range Glucose (POC) 202 (H) 70 - 110 mg/dL METABOLIC PANEL, BASIC Collection Time: 08/02/18  3:19 AM  
Result Value Ref Range Sodium 143 136 - 145 mmol/L Potassium 5.0 3.5 - 5.5 mmol/L Chloride 115 (H) 100 - 108 mmol/L  
 CO2 21 21 - 32 mmol/L Anion gap 7 3.0 - 18 mmol/L Glucose 158 (H) 74 - 99 mg/dL BUN 14 7.0 - 18 MG/DL Creatinine 1.27 0.6 - 1.3 MG/DL  
 BUN/Creatinine ratio 11 (L) 12 - 20 GFR est AA 49 (L) >60 ml/min/1.73m2 GFR est non-AA 41 (L) >60 ml/min/1.73m2 Calcium 8.5 8.5 - 10.1 MG/DL  
GLUCOSE, POC Collection Time: 08/02/18  7:27 AM  
Result Value Ref Range Glucose (POC) 134 (H) 70 - 110 mg/dL Imaging: 
I have personally reviewed the patients radiographs and have reviewed the reports: CXR Results  (Last 48 hours) 08/01/18 1054  XR CHEST SNGL V Final result Impression:  IMPRESSION: No significant pneumothorax. Bilateral lung base haziness as above. Narrative:  INDICATION:  Status post right thoracentesis. COMPARISON:  Chest radiograph 7/18. FINDINGS: A portable AP radiograph of the chest was obtained at 1041 hours. No  
significant pneumothorax. Stable enlargement of the cardiac silhouette and mild  
pulmonary vasculature prominence.  Bilateral lung base small amount of haziness  
likely a combination of small effusions and overlying edema/atelectasis/infiltrate. Possible mild interval decrease in the right-sided  
effusion. No acute osseous abnormality. 7/28/18 CXR- PA/LAT EXAM: Chest Radiographs 
  
INDICATION: Pleural effusion 
  
TECHNIQUE: PA and lateral views of the chest 
  
COMPARISON: 7/26/2018 
  
FINDINGS: No pneumothorax identified. Moderate to large bilateral pleural 
effusions right greater than left are noted. Bibasilar opacities which may 
resent atelectasis or infiltrate. Cardiomegaly is present. The pulmonary 
vascularity is unremarkable. The osseous structures are unremarkable. 
  
IMPRESSION IMPRESSION: 
1. Moderate to large bilateral pleural effusions right greater than left. CT Results  (Last 48 hours) None High complexity decision making was performed during the evaluation of this patient at high risk for decompensation with multiple organ involvement 
  
Above mentioned total time spent on reviewing the case/medical record/data/notes/EMR/patient examination/documentation/coordinating care with nurse/consultants, exclusive of procedures with complex decision making performed and > 50% time spent in face to face evaluation. Nava Morgan DO, FCCP Pulmonary, Sleep, Critical Care Medicine Clinical care, chart review and time spent coordinating care: 25 min

## 2018-08-02 NOTE — ROUTINE PROCESS
Bedside and Verbal shift change report given to Faith Brice RN (oncoming nurse) by Slim Staley (offgoing nurse). Report included the following information SBAR, Kardex, MAR and Recent Results. SITUATION:  
 Code Status: DNR  Reason for Admission: HCAP (healthcare-associated pneumonia) [facility] day: 7  Problem List:  
   
Hospital Problems  Date Reviewed: 6/23/2018 Codes Class Noted POA HCAP (healthcare-associated pneumonia) ICD-10-CM: J18.9 ICD-9-CM: 761  7/26/2018 Unknown BACKGROUND:  
 Past Medical History:  
Past Medical History:  
Diagnosis Date  Anemia NEC  Arthritis  Arthropathy, unspecified, site unspecified  Asthma  Atrial fibrillation ([facility] [address] CHRONIC A-FIB  Diabetes mellitus  Essential hypertension  Heart abnormalities   
 irregular heartbeat  Hypertension  Liver disease   
 steatohepatitis  Neurological disorder   
 dementia  Osteoarthritis LEFT HIP Patient taking anticoagulants yes ASSESSMENT:  
 Changes in Assessment Throughout Shift: none  Patient has Central Line: no Reasons if yes: n/a  Patient has Aden Cath: no Reasons if yes: n/a  Last Vitals: 
  
Vitals:  
 08/01/18 2051 08/02/18 0028 08/02/18 4743 08/02/18 2497 BP: 159/81 157/81 154/78 Pulse: 62 (!) 56 (!) 54 Resp: 18 18 18 Temp: 96.9 °F (36.1 °C) 96.7 °F (35.9 °C) 97.4 °F (36.3 °C) SpO2: 100% 93% 99% Weight:    93.5 kg (206 lb 1.6 oz) Height:      
 
 
 IV and DRAINS (will only show if present) [REMOVED] External Female Catheter 07/27/18-Site Assessment: Clean, dry, & intact [REMOVED] Peripheral IV 07/26/18 Left Hand-Site Assessment: Clean, dry, & intact [REMOVED] External Female Catheter 07/28/18-Site Assessment: Clean, dry, & intact [REMOVED] Peripheral IV 07/29/18 Right Wrist-Site Assessment: Clean, dry, & intact [REMOVED] External Female Catheter 07/30/18-Site Assessment: Clean, dry, & intact [REMOVED] Peripheral IV 07/26/18 Right Wrist-Site Assessment: Clean, dry, & intact [REMOVED] Peripheral IV 07/30/18 Left Wrist-Site Assessment: Clean, dry, & intact External Female Catheter 08/02/18-Site Assessment: Clean, dry, & intact  WOUND (if present) Wound Type:  none Dressing present Dressing Present : Yes Wound Concerns/Notes:  none  PAIN Pain Assessment Pain Intensity 1: 0 (08/02/18 0339) Patient Stated Pain Goal: 0 
o Interventions for Pain:  none 
o Intervention effective: n/a 
o Time of last intervention: n/a  
o Reassessment Completed: yes  Last 3 Weights: 
Last 3 Recorded Weights in this Encounter 07/31/18 0448 08/01/18 0422 08/02/18 7806 Weight: 95.4 kg (210 lb 4.8 oz) 94.9 kg (209 lb 4.8 oz) 93.5 kg (206 lb 1.6 oz) Weight change: -1.452 kg (-3 lb 3.2 oz)  INTAKE/OUPUT Current Shift: 08/01 1901 - 08/02 0700 In: 120 [P.O.:120] Out: 950 [Urine:950] Last three shifts: 07/31 0701 - 08/01 1900 In: -  
Out: 1100 [Urine:1100]  LAB RESULTS Recent Labs 08/01/18 
 028) 1851-502  07/31/18 
 0235 WBC  7.6  7.6 HGB  10.9*  10.5* HCT  34.7*  32.9*  
PLT  245  265 Recent Labs 08/02/18 
 1101  08/01/18 
 0930  08/01/18 
 0355  07/31/18 
 1648  07/31/18 
 0235 NA  143   --   142   --   142  
K  5.0   --   4.0   --   2.9*  
GLU  158*   --   156*   --   158* BUN  14   --   15   --   19* CREA  1.27   --   1.45*   --   2.18* CA  8.5   --   8.3*   --   8.1* INR   --   1.6*   --   1.7*   --   
 
 
RECOMMENDATIONS AND DISCHARGE PLANNING 1. Pending tests/procedures/ Plan of Care or Other Needs: continue to monitor 2. Discharge plan for patient and Needs/Barriers: home 3. Estimated Discharge Date: TBD Posted on Whiteboard in Patients Room: no   
 
4. The patient's care plan was reviewed with the oncoming nurse. \"HEALS\" SAFETY CHECK Fall Risk Total Score: 3  Safety Measures: Safety Measures: Bed/Chair-Wheels locked, Bed in low position, Call light within reach A safety check occurred in the patient's room between off going nurse and oncoming nurse listed above. The safety check included the below items Area Items H High Alert Medications - Verify all high alert medication drips (heparin, PCA, etc.) E Equipment - Suction is set up for ALL patients (with ted) - Red plugs utilized for all equipment (IV pumps, etc.) - WOWs wiped down at end of shift. 
- Room stocked with oxygen, suction, and other unit-specific supplies A Alarms - Bed alarm is set for fall risk patients - Ensure chair alarm is in place and activated if patient is up in a chair L Lines - Check IV for any infiltration - Aden bag is empty if patient has a Aden - Tubing and IV bags are labeled [employer] Safety - Room is clean, patient is clean, and equipment is clean. - Hallways are clear from equipment besides carts. - Fall bracelet on for fall risk patients - Ensure room is clear and free of clutter - Suction is set up for ALL patients (with ted) - Hallways are clear from equipment besides carts. - Isolation precautions followed, supplies available outside room, sign posted Riya Garza

## 2018-08-02 NOTE — PROGRESS NOTES
Problem: Mobility Impaired (Adult and Pediatric) Goal: *Acute Goals and Plan of Care (Insert Text) Physical Therapy Goals Initiated 7/30/2018 and to be accomplished within 7 day(s) 1. Patient will move from supine to sit and sit to supine  and roll side to side in bed with minimal assistance/contact guard assist.    
2.  Patient will transfer from bed to chair and chair to bed with minimal assistance/contact guard assist using the least restrictive device. 3.  Patient will perform sit to stand with minimal assistance/contact guard assist. 
4.  Patient will ambulate with minimal assistance/contact guard assist for >5 feet with the least restrictive device. physical Therapy TREATMENT Patient: Jacky Rowe (89 y.o. female) Date: 8/2/2018 Diagnosis: HCAP (healthcare-associated pneumonia) <principal problem not specified> Precautions: Fall, Skin Chart, physical therapy assessment, plan of care and goals were reviewed. ASSESSMENT: 
Pt semi-reclined in bed upon entry of PT. Pt agreeable to participate in PT. Pt had O2 via nasal cannula donned. Pt pleasant but confused with some difficulty following commands and slow movement with increased time to process cues. Pt required max A with HOB elevated for supine to sit and sit to supine. Participated in sitting balance at EOB with CGA/min A to prevent LOB once positioned at EOB. Attempted sit to stand from EOB with max A x 1 person (for 2 trials), and max A x 2 people (x1 trial); but unable to achieve standing posture/wt bear on LE's. Pt returned to supine in bed and session ended as hospice nurse arrived to speak with pt. Progression toward goals: 
[]      Improving appropriately and progressing toward goals [x]      Improving slowly and progressing toward goals 
[]      Not making progress toward goals and plan of care will be adjusted PLAN: 
Patient continues to benefit from skilled intervention to address the above impairments. Continue treatment per established plan of care. Discharge Recommendations:  Mykel Tariq Further Equipment Recommendations for Discharge:  hospital bed, rolling walker and wheelchair G-CODES:  
 
Mobility [id number] Current  CM= 80-99%   Goal  CK= 40-59%. The severity rating is based on the Level of Assistance required for Functional Mobility and ADLs. SUBJECTIVE:  
Patient stated No pain.  \"I'm hungry. \" OBJECTIVE DATA SUMMARY:  
Critical Behavior: 
Neurologic State: Alert Orientation Level: Oriented to person, Disoriented to place, Disoriented to situation, Disoriented to time Cognition: Impaired decision making, Decreased command following Safety/Judgement: Fall prevention Functional Mobility Training: 
 
Bed Mobility: 
Rolling: Maximum assistance, with use of siderail for assistance Supine to Sit: Maximum assistance Sit to Supine: Maximum assistance Scooting: Total assistance 
  
  additional time to process task. Transfers: 
Sit to Stand:  (attempted x 2 with max A, also x 1 with max A x 2; unable ) 
 unable to achieve standing posture today, despite multiple attempts. Balance: 
Sitting: Impaired; With support Sitting - Static: Fair (occasional) Sitting - Dynamic: Fair (occasional) Standing: Impaired; With support Standing - Static: Poor Ambulation/Gait Training: 
 n/a Pain: 
Pt reports 0/10 pain or discomfort prior to treatment.   
Pt reports 0/10 pain or discomfort post treatment. Activity Tolerance:  
Fair, pt without c/o's of pain, SOB, nausea, or dizziness; but seems to fatigue quickly with activity Please refer to the flowsheet for vital signs taken during this treatment. After treatment:  
[] Patient left in no apparent distress sitting up in chair 
[x] Patient left in no apparent distress in bed 
[x] Call bell left within reach [x] Hospice nurse present in room 
[] Caregiver present 
[] Bed alarm activated Damien Leon PT 
 Time Calculation: 20 mins

## 2018-08-02 NOTE — PROGRESS NOTES
Problem: Falls - Risk of 
Goal: *Absence of Falls Document Shakila Earl Fall Risk and appropriate interventions in the flowsheet. Outcome: Progressing Towards Goal 
Fall Risk Interventions: 
Mobility Interventions: PT Consult for mobility concerns, PT Consult for assist device competence Mentation Interventions: Adequate sleep, hydration, pain control, Bed/chair exit alarm, Eyeglasses and hearing aids, More frequent rounding, Reorient patient, Room close to nurse's station Medication Interventions: Bed/chair exit alarm, Evaluate medications/consider consulting pharmacy Elimination Interventions: Bed/chair exit alarm, Patient to call for help with toileting needs, Toileting schedule/hourly rounds History of Falls Interventions: Bed/chair exit alarm, Door open when patient unattended, Room close to nurse's station Problem: Pressure Injury - Risk of 
Goal: *Prevention of pressure injury Document Julián Scale and appropriate interventions in the flowsheet. Outcome: Progressing Towards Goal 
Pressure Injury Interventions: 
Sensory Interventions: Assess changes in LOC, Assess need for specialty bed, Keep linens dry and wrinkle-free, Maintain/enhance activity level, Minimize linen layers Moisture Interventions: Absorbent underpads, Check for incontinence Q2 hours and as needed, Internal/External urinary devices, Limit adult briefs, Maintain skin hydration (lotion/cream), Minimize layers Activity Interventions: Assess need for specialty bed, Pressure redistribution bed/mattress(bed type), PT/OT evaluation Mobility Interventions: Assess need for specialty bed, HOB 30 degrees or less, Pressure redistribution bed/mattress (bed type), PT/OT evaluation Nutrition Interventions: Document food/fluid/supplement intake Friction and Shear Interventions: Apply protective barrier, creams and emollients, HOB 30 degrees or less

## 2018-08-02 NOTE — PROGRESS NOTES
[facility]  -  Progress Note Patient: Eulalio Weems MRN: [id number]  SSN: [id number] YOB: 1940  Age: 66 y.o. Sex: female Admit Date: 7/26/2018 Hospital Problem List:  
 
Hospital Problems  Date Reviewed: 6/23/2018 Codes Class Noted POA HCAP (healthcare-associated pneumonia) ICD-10-CM: J18.9 ICD-9-CM: 957  7/26/2018 Unknown  
   
  
 
-AMS, CT head negative for acute CVA, improved -HCAP, on abx 
-Likely dehydration -Hypokalemia and  Hypomagnesia, being repleted 
-Pleural effusions, R > L, s/p thoracentesis (R) 8/1/18 with removal of 450cc fluid, path pending. 
-Hx of chronic afib, rate controlled, on Eliquis for OAC 
-Diastolic CHF, compensated, Normal LV function by echo June 23, 2018 with EF 50-55%, moderate to severe MR and severe wide open TR.  
-Pulmonary HTN, 2/2 above 
-Parkinson's disease 
-Morbid obesity 
-Deconditioning, SNF resident, at baseline walks a little 
-COPD 
   
-Follows with [address] [place] [address]:  
 
-Resume eliquis when ok with all other teams.  
-Continue lopressor. Continue aspirin and statin. Considering resuming ARB as renal function has normalized. Subjective: No new complaints. Objective:  
  
Patient Vitals for the past 8 hrs: 
 Temp Pulse Resp BP SpO2  
08/02/18 1200 - (!) 58 - - -  
08/02/18 1149 98.3 °F (36.8 °C) (!) 49 18 145/76 99 % 08/02/18 0727 97.9 °F (36.6 °C) (!) 55 18 157/75 100 % Patient Vitals for the past 96 hrs: 
 Weight 08/02/18 0520 93.5 kg (206 lb 1.6 oz) 08/01/18 0422 94.9 kg (209 lb 4.8 oz) 07/31/18 0448 95.4 kg (210 lb 4.8 oz) 07/30/18 0338 94.1 kg (207 lb 8 oz) Intake/Output Summary (Last 24 hours) at 08/02/18 1302 Last data filed at 08/02/18 0189 Gross per 24 hour Intake              120 ml Output             1050 ml Net             -930 ml Physical Exam: 
General:  Awake, alert, appears comfortable in bed Neck: Supple without jvd 
Lungs:  Diminished breath sounds at bilat bases without appreciation of rales, crackles, or wheezing, normal effort, on NC O2 Heart: Reg rhythm, bradycardic, no murmur appreciated Abdomen:  Soft, no guarding Extremities: atraumatic without edema Data Review:  
 
Labs: Results:  
   
Chemistry Recent Labs 08/02/18 
 7091  08/01/18 
 9393  07/31/18 
 0235 GLU  158*  156*  158* NA  143  142  142  
K  5.0  4.0  2.9*  
CL  115*  115*  111* CO2  21  19*  20* BUN  14  15  19* CREA  1.27  1.45*  2.18* CA  8.5  8.3*  8.1* AGAP  7  8  11 BUCR  11*  10*  9* AP   --   83   --   
TP   --   6.8   --   
ALB   --   2.6*   --   
GLOB   --   4.2*   --   
AGRAT   --   0.6*   --   
  
CBC w/Diff Recent Labs 08/01/18 
 [phone]  07/31/18 
 0235 WBC  7.6  7.6  
RBC  3.78*  3.66* HGB  10.9*  10.5* HCT  34.7*  32.9*  
PLT  245  265 Cardiac Enzymes No results found for: CPK, CK, CKMMB, CKMB, RCK3, CKMBT, CKNDX, CKND1, NAMAN, TROPT, TROIQ, RONA, TROPT, TNIPOC, BNP, BNPP Coagulation Recent Labs 08/01/18 
 0930  07/31/18 
 1648 PTP  18.6*  20.1* INR  1.6*  1.7* APTT   --   47.0* Lipid Panel Lab Results Component Value Date/Time Cholesterol, total 118 (L) 03/01/2017 09:54 AM  
 HDL Cholesterol 48 03/01/2017 09:54 AM  
 LDL, calculated 54 03/01/2017 09:54 AM  
 Triglyceride 78 03/01/2017 09:54 AM  
 CHOL/HDL Ratio 2.5 03/01/2017 09:54 AM  
  
BNP Lab Results Component Value Date/Time BNP 88.0 04/12/2016 10:30 AM  
  
Liver Enzymes Recent Labs 08/01/18 
 0355 TP  6.8 ALB  2.6* AP  83 SGOT  16  
  
Digoxin Thyroid Studies Lab Results Component Value Date/Time TSH 2.00 07/26/2018 01:50 PM  
    
 
 
Signed By: KIRSTIN Castro August 2, 2018

## 2018-08-03 PROBLEM — J18.9 HCAP (HEALTHCARE-ASSOCIATED PNEUMONIA): Status: RESOLVED | Noted: 2018-01-01 | Resolved: 2018-01-01

## 2018-08-03 NOTE — PROGRESS NOTES
Talked with Mariana Olsen at [facility] (phone: [phone], number for Nurse report: [phone]) in regards to patient being able to return on hospice, and she stated that the  was not coming today, but that patient was ok to return to the facility on hospice, but that she would need scripts faxed early if their pharmacy needed to fill them. [facility] and they stated that they would need to order O2 for patient and that it would take about 3 hours, and they would provide patient's narcotics. BLS transport has been arranged for 6:30 pm. Informed hospital Physician of the above, and D/C Summary is being dictated now. Talked with patient's daughter Tee Damian and informed her of the above and she was in agreement with this. Kiki Galloway, BSW, Care Manager.

## 2018-08-03 NOTE — PROGRESS NOTES
TC to Dr. Chris Dent. Made aware of low urine output, diarrhea and low heart rate. Verbal order taken to hold pm lactulose. No other interventions at this time.

## 2018-08-03 NOTE — PROGRESS NOTES
Care Management Specialist reviewed over the Important Medicare Rights Letter with patient and patient daughter. Patient daughter signed the letter. Patient given a copy of letter by bedside. Signed copy placed into patient chart.

## 2018-08-03 NOTE — PROGRESS NOTES
Problem: Mobility Impaired (Adult and Pediatric) Goal: *Acute Goals and Plan of Care (Insert Text) Physical Therapy Goals Initiated 7/30/2018 and to be accomplished within 7 day(s) 1. Patient will move from supine to sit and sit to supine  and roll side to side in bed with minimal assistance/contact guard assist.    
2.  Patient will transfer from bed to chair and chair to bed with minimal assistance/contact guard assist using the least restrictive device. 3.  Patient will perform sit to stand with minimal assistance/contact guard assist. 
4.  Patient will ambulate with minimal assistance/contact guard assist for >5 feet with the least restrictive device. Outcome: Progressing Towards Goal 
physical Therapy TREATMENT Patient: Marline Wganer (86 y.o. female) Date: 8/3/2018 Diagnosis: HCAP (healthcare-associated pneumonia) <principal problem not specified> Precautions: Aspiration, Fall Chart, physical therapy assessment, plan of care and goals were reviewed. OBJECTIVE/ASSESSMENT: 
Patient presented today semi-reclined in bed, drowsy but agreeable to PT treatment. Patient crying out for help upon PT entering room, asking for assistance to find her glasses, which patient is currently wearing. Patient oriented to self only, required frequent verbal and tactile cues for attention to task and to maintain alertness. Patient transferred to sitting EOB with MaxA, tolerated sitting >10 minutes with SBA for safety d/t patient's confusion and drowsiness. Patient completed seated LE therex (see below), then scooted along EOB with MaxA. Patient assisted back to bed where she immediately fell asleep. Patient left resting comfortably with call bell in reach, bed alarm in place, and needs met. Education: bed mobility, transfers, therex, activity pacing, body mechanics -- patient verbalized understanding, requires reinforcement Progression toward goals: 
[]      Improving appropriately and progressing toward goals [x]      Improving slowly and progressing toward goals 
[]      Not making progress toward goals and plan of care will be adjusted PLAN: 
Patient continues to benefit from skilled intervention to address the above impairments. Continue treatment per established plan of care. Discharge Recommendations:  Mykel Tariq Further Equipment Recommendations for Discharge:  N/A  
 
SUBJECTIVE:  
Patient stated Help me.  OBJECTIVE DATA SUMMARY:  
Critical Behavior: 
Neurologic State: Drowsy Orientation Level: Unable to verbalize (drowsy, sleeping) Cognition: Unable to assess (comment) (drowsy/sleeping) Safety/Judgement: Decreased insight into deficits, Fall prevention Functional Mobility Training: 
Bed Mobility: 
Rolling: Maximum assistance Supine to Sit: Maximum assistance Sit to Supine: Maximum assistance Scooting: Maximum assistance (Along EOB) Transfers: 
Sit to Stand:  (Attempted, unable to complete) Balance: 
Sitting: Impaired; With support Sitting - Static: Fair (occasional) Sitting - Dynamic: Fair (occasional) Standing:  (N/A) Ambulation/Gait Training: N/A Therapeutic Exercises:  
 
 
EXERCISE Sets Reps Active Active Assist  
Passive Self ROM Comments Ankle Pumps    [] [] [] [] Quad Sets/Glut Sets   [] [] [] [] Hamstring Sets   [] [] [] [] Short Arc Quads   [] [] [] [] Heel Slides   [] [] [] [] Straight Leg Raises   [] [] [] [] Hip Abd/Add   [] [] [] [] Long Arc Quads 1 10 [x] [] [] [] BLE Seated Marching   [] [] [] []   
Standing Marching   [] [] [] []   
   [] [] [] []   
 
Pain: 
Pre session: 0/10 Post session: 0/10 Activity Tolerance:  
Fair Please refer to the flowsheet for vital signs taken during this treatment. After treatment:  
[] Patient left in no apparent distress sitting up in chair 
[x] Patient left in no apparent distress in bed 
[x] Call bell left within reach [x] Nursing notified 
[] Caregiver present [x] Bed alarm activated Chuck Gunter Time Calculation: 23 mins Mobility [id number] Current  CM= 80-99%   Goal  CJ= 20-39%. The severity rating is based on the Level of Assistance required for Functional Mobility and ADLs.

## 2018-08-03 NOTE — PROGRESS NOTES
Problem: Falls - Risk of 
Goal: *Absence of Falls Document Ivone Kendall Fall Risk and appropriate interventions in the flowsheet. Outcome: Progressing Towards Goal 
Fall Risk Interventions: 
Mobility Interventions: Bed/chair exit alarm, Strengthening exercises (ROM-active/passive) Mentation Interventions: Door open when patient unattended, Evaluate medications/consider consulting pharmacy, Eyeglasses and hearing aids, Update white board, Reorient patient, More frequent rounding Medication Interventions: Evaluate medications/consider consulting pharmacy Elimination Interventions: Call light in reach, Toileting schedule/hourly rounds History of Falls Interventions: Consult care management for discharge planning, Evaluate medications/consider consulting pharmacy, Door open when patient unattended, Room close to nurse's station

## 2018-08-03 NOTE — DISCHARGE SUMMARY
Discharge Summary Patient: Gautam Watson MRN: [id number]  CSN: [id number] YOB: 1940  Age: 66 y.o. Sex: female DOA: 7/26/2018 LOS:  LOS: 8 days   Discharge Date:   
 
Admission Diagnoses: HCAP (healthcare-associated pneumonia) Discharge Diagnoses:   
Problem List as of 8/3/2018  Date Reviewed: 6/23/2018 Codes Class Noted - Resolved Atrial fibrillation with RVR (HCC) ICD-10-CM: I48.91 
ICD-9-CM: 427.31  6/23/2018 - Present Atrial fibrillation with RVR (HCC) ICD-10-CM: I48.91 
ICD-9-CM: 427.31  6/23/2018 - Present Chronic anticoagulation ICD-10-CM: Z79.01 
ICD-9-CM: V58.61  6/23/2018 - Present Lower extremity edema ICD-10-CM: R60.0 ICD-9-CM: 782.3  6/23/2018 - Present Psychiatric disorder ICD-10-CM: F99 
ICD-9-CM: 300.9  6/23/2018 - Present Edema ICD-10-CM: R60.9 ICD-9-CM: 782.3  2/28/2017 - Present Shortness of breath ICD-10-CM: R06.02 
ICD-9-CM: 786.05  2/28/2017 - Present Pneumonia ICD-10-CM: J18.9 ICD-9-CM: 110  2/28/2017 - Present Cellulitis of left lower extremity ICD-10-CM: L03.116 ICD-9-CM: 682.6  2/28/2017 - Present Hip arthritis ICD-10-CM: M16.10 ICD-9-CM: 716.95  1/26/2017 - Present Cough ICD-10-CM: R05 ICD-9-CM: 786.2  4/12/2016 - Present RESOLVED: HCAP (healthcare-associated pneumonia) ICD-10-CM: J18.9 ICD-9-CM: 547  7/26/2018 - 8/3/2018 Reason for Admission 80-year-old -American female with a past medical history of Parkinson disease, recent admission for CHF at [facility], she has an EF of 51-55%, moderate to severe MR and wide-open TR with pulmonary hypertension. She also has chronic atrial fibrillation, maintained on Eliquis. Her cardiologist is in [place]. During her admission at [facility] she was diuresed, and then returned to [facility]   The daughter at the bedside provided most of the history and stated that the patient is not a candidate for rehab because she does not recall the exercises and what she was taught there. She was brought into the ED, sent by [address] [place] [address] for altered mental status. In the ED, she was noted to have a potassium of 2.7 and a magnesium of 1.2. She had bilateral pleural effusions, right greater than left, with likely bilateral infiltrates as well as atelectasis. Her daughter related that they had appealed her discharge at [facility], but she ultimately had been discharged back to [address] [place] [address]. She felt that the patient needed more time in the hospital.   
 
There was report of fever from [address] [place] [address], and scant cough. The patient described pain in her legs and cramping. There was no nausea or vomiting, but there was a report of decreased appetite. No report of foul odor to her urine. She did have some diarrhea during her recent hospital stay. At her baseline, she walks a little. She was walking just a couple of months ago. The daughter also stated that at her baseline mental status, she knows her date of birth, but she does not know the day of the week or the year. Discharge Condition: appropriate for discharge with hospice services. PHYSICAL EXAM at discharge. Visit Vitals  /67 (BP 1 Location: Left arm, BP Patient Position: At rest)  Pulse (!) 56  Temp 97.6 °F (36.4 °C)  Resp 18  Ht 5' 5\" (1.651 m)  Wt 93.4 kg (206 lb)  SpO2 100%  Breastfeeding No  
 BMI 34.28 kg/m2 Awake and alert, oriented x1. Ncat. perrl Irregularly irregular 
cta b.l anterior and infraaxillary fields. abd soft nt nd nabs No edema. dp 2+ b.l Moving all 4s. +asterixis No rash Hospital Course: 1.  Acute metabolic encephalopathy superimposed on dementia - improving. Daughter requested MRI, but patient unable to lay flat for study. 2.  HCAP. Blood cx (7/26) ngtd. Improving s/p vanco and zosyn, will complete course of doxy.   
3.  Preserved ejection fraction on recent echocardiogram.  Moderate to severe mitral regurgitation, wide-open tricuspid regurgitation. she is not surgical candidate; Cardiology input appreciated. 4.  Chronic atrial fibrillation. on Eliquis and beta blocker.   
5.  Anemia, normocytic, normochromic. 6.  Hypokalemia repleted 7.  Hypomagnesemia repleted 8.  Right greater than left pleural effusions. chronic diastolic heart failure and poor cough with basal atelectasis s/p IR thoracentesis earlier today- By Light's Criteria- Protein: appears transudative which is more consistent with CHF. 9.  Atelectasis, ICS done. 10.  Parkinson's, under the care of Dr. Felix Oconnell. 11.  obesity. Body mass index is 37.49 kg/(m^2). 12.  Ambulatory dysfunction.  She was walking a couple of months ago, but no longer. 13. excesive somnolence improving. No evidence of CO2 retention; head CT, UA ok. Lactulose given for elevated NH3. 14. mild- moderate oropharyngeal dysphagia. SLP recs are below. 15. DM2 with hyperglycemia - lantus / ssi. 16. Deep venous thrombosis prophylaxis was given in the form of Eliquis. 17. DNR / durable DNR signed this admission. Discharge to [facility] with hospice services. Family agrees; all questions answered to the best of my ability.  
  
Daughter Scott Tellez [phone] Consults: [facility]. Cardiology Dr. Renato Rosas Significant Diagnostic Studies: labs:  
Recent Results (from the past 24 hour(s)) GLUCOSE, POC Collection Time: 08/02/18  4:33 PM  
Result Value Ref Range Glucose (POC) 488 (HH) 70 - 110 mg/dL GLUCOSE, POC Collection Time: 08/02/18  9:46 PM  
Result Value Ref Range Glucose (POC) 102 70 - 110 mg/dL CBC WITH AUTOMATED DIFF Collection Time: 08/03/18  3:20 AM  
Result Value Ref Range WBC 7.7 4.6 - 13.2 K/uL  
 RBC 3.54 (L) 4.20 - 5.30 M/uL  
 HGB 10.2 (L) 12.0 - 16.0 g/dL HCT 32.3 (L) 35.0 - 45.0 %  MCV 91.2 74.0 - 97.0 FL  
 MCH 28.8 24.0 - 34.0 PG  
 MCHC 31.6 31.0 - 37.0 g/dL  
 RDW 15.2 (H) 11.6 - 14.5 % PLATELET 915 485 - 871 K/uL MPV 10.5 9.2 - 11.8 FL  
 NEUTROPHILS 74 (H) 40 - 73 % LYMPHOCYTES 13 (L) 21 - 52 % MONOCYTES 11 (H) 3 - 10 % EOSINOPHILS 2 0 - 5 % BASOPHILS 0 0 - 2 %  
 ABS. NEUTROPHILS 5.7 1.8 - 8.0 K/UL  
 ABS. LYMPHOCYTES 1.0 0.9 - 3.6 K/UL  
 ABS. MONOCYTES 0.9 0.05 - 1.2 K/UL  
 ABS. EOSINOPHILS 0.1 0.0 - 0.4 K/UL  
 ABS. BASOPHILS 0.0 0.0 - 0.1 K/UL  
 DF AUTOMATED METABOLIC PANEL, BASIC Collection Time: 08/03/18  3:20 AM  
Result Value Ref Range Sodium 142 136 - 145 mmol/L Potassium 4.5 3.5 - 5.5 mmol/L Chloride 114 (H) 100 - 108 mmol/L  
 CO2 19 (L) 21 - 32 mmol/L Anion gap 9 3.0 - 18 mmol/L Glucose 78 74 - 99 mg/dL BUN 17 7.0 - 18 MG/DL Creatinine 1.18 0.6 - 1.3 MG/DL  
 BUN/Creatinine ratio 14 12 - 20 GFR est AA 54 (L) >60 ml/min/1.73m2 GFR est non-AA 44 (L) >60 ml/min/1.73m2 Calcium 8.4 (L) 8.5 - 10.1 MG/DL  
GLUCOSE, POC Collection Time: 08/03/18  7:26 AM  
Result Value Ref Range Glucose (POC) 83 70 - 110 mg/dL GLUCOSE, POC Collection Time: 08/03/18 11:43 AM  
Result Value Ref Range Glucose (POC) 134 (H) 70 - 110 mg/dL GLUCOSE, POC Collection Time: 08/03/18  3:45 PM  
Result Value Ref Range Glucose (POC) 158 (H) 70 - 110 mg/dL All Micro Results Procedure Component Value Units Date/Time CULTURE, BODY FLUID [[id number]] Collected:  08/01/18 1100 Order Status:  Completed Specimen:  Pleural Fluid Updated:  08/03/18 5943 Special Requests: NO SPECIAL REQUESTS     
  GRAM STAIN MODERATE WBC'S     
   NO ORGANISMS SEEN Culture result: NO GROWTH 2 DAYS     
 AFB CULTURE + SMEAR W/RFLX PCR AND ID [[id number]] Collected:  08/01/18 1101 Order Status:  Completed Specimen:  Respiratory sample Updated:  08/02/18 1836 Source PLEURAL FLUID     
  AFB Specimen processing Concentration   Acid Fast Smear NEGATIVE (NOTE) Performed At: [facility] [address], [place] [id number] Dennie Mas MD RK:[id number] Acid Fast Culture PENDING  
 CULTURE, BLOOD [[id number]] Collected:  07/26/18 1428 Order Status:  Completed Specimen:  Blood from Blood Updated:  08/01/18 0468 Special Requests: NO SPECIAL REQUESTS Culture result: NO GROWTH 6 DAYS     
 CULTURE, BLOOD [[id number]] Collected:  07/26/18 1400 Order Status:  Completed Specimen:  Blood from Blood Updated:  08/01/18 2022 Special Requests: NO SPECIAL REQUESTS Culture result: NO GROWTH 6 DAYS     
 CULTURE, BODY FLUID Madi Freud STAIN [[id number]] Order Status:  Canceled Specimen:  Thoracentesis IMAGING 
XR Results (most recent): 
 
Results from Hospital Encounter encounter on 07/26/18 XR CHEST PORT Narrative EXAM: PORTABLE CHEST 1251 hours CLINICAL HISTORY/INDICATION: s/p right thoracenesis interval eval 
   
COMPARISON: Chest x-ray August 1, July 28, July 26, 2018. TECHNIQUE: Single AP view FINDINGS:  
 
There is no pneumothorax. There is minimal residual blunting of the right 
costophrenic angle. There is mild blunting of the left costophrenic angle. There 
is airspace disease from the right perihilar region to the lung base. There is 
diminished penetration in the left retrocardiac region. Jearyomaira Edgerton Impression IMPRESSION: 
 
Right basal infiltrate or subsegmental atelectasis. Cannot exclude left basal 
infiltrate or atelectasis. Small bilateral pleural effusions. Cardiomegaly XR SWALLOW [facility] VIDEO 8/2/2018 No aspiration or penetration with thin, nectar, and puree consistency barium. No aspiration or penetration with cracker with barium paste and with barium tablet. CT Results (most recent): 
 
Results from [facility] Center Encounter encounter on 07/26/18 CT HEAD WO CONT  Narrative EXAM: CT head without contrast 
 
CLINICAL HISTORY/INDICATION: Altered mental status and fever. 
 
COMPARISON: None. TECHNIQUE: All CT scans at this facility are performed using dose optimization 
technique as appropriate to a performed exam, to include automated exposure 
control, adjustment of the mA and/or kV according to patient's size (including 
appropriate matching for site-specific examinations), or use of iterative 
reconstruction technique. Herminio Rodriguez FINDINGS: 
 
CT scan is performed with multiple axial images from skull base to vertex. There 
is mild enlargement of the cerebral sulci, cisterns and ventricles compatible 
with diffuse atrophy. No acute hemorrhage or midline shift is noted. Bone window 
images shows no evidence for a fracture in the calvarium. Mild inflammatory 
changes are noted in the right sphenoid sinus. Herminio Rodriguez Impression IMPRESSION: 
 
No acute hemorrhage or midline shift. Mild diffuse atrophy. No evidence for fracture in the calvarium. Mild inflammatory changes right sphenoid sinus. Herminio Rodriguez Discharge Medications:    
Current Discharge Medication List  
  
START taking these medications Details  
doxycycline (VIBRAMYCIN) 100 mg capsule Take 1 Cap by mouth every twelve (12) hours for 2 days. Qty: 4 Cap, Refills: 0  
  
insulin glargine (LANTUS) 100 unit/mL injection 18 Units by SubCUTAneous route Daily (before dinner). Qty: 1 Vial, Refills: 2  
  
morphine (ROXANOL) 100 mg/5 mL (20 mg/mL) concentrated solution Take 0.5 mL by mouth every two (2) hours as needed for Pain. Max Daily Amount: 120 mg. 
Qty: 15 mL, Refills: 0 Associated Diagnoses: Shortness of breath  
  
atropine 1 % ophthalmic solution 1 Drop by SubLINGual route every four (4) hours as needed. Qty: 5 mL, Refills: 0 Associated Diagnoses: Shortness of breath LORazepam (LORAZEPAM INTENSOL) 2 mg/mL concentrated solution Take 0.5 mL by mouth every four (4) hours as needed for Agitation, Anxiety or Other (shortness of breath). Max Daily Amount: 6 mg. Qty: 30 mL, Refills: 0  Associated Diagnoses: Shortness of breath CONTINUE these medications which have CHANGED Details  
metoprolol tartrate (LOPRESSOR) 25 mg tablet Take 0.5 Tabs by mouth every twelve (12) hours. Indications: VENTRICULAR RATE CONTROL IN ATRIAL FIBRILLATION Qty: 30 Tab, Refills: 2 CONTINUE these medications which have NOT CHANGED Details  
acetaminophen (TYLENOL) 325 mg tablet Take 2 Tabs by mouth every six (6) hours as needed for Pain or Fever. Indications: Arthritic Pain, Fever Qty: 30 Tab, Refills: 0  
  
albuterol (PROVENTIL HFA, VENTOLIN HFA, PROAIR HFA) 90 mcg/actuation inhaler Take 1 Puff by inhalation every six (6) hours as needed (Asthma/cough). Indications: Acute Asthma Attack Qty: 1 Inhaler, Refills: 0  
  
albuterol (PROVENTIL VENTOLIN) 2.5 mg /3 mL (0.083 %) nebulizer solution 3 mL by Nebulization route every four (4) hours as needed for Wheezing. Indications: Chronic Obstructive Pulmonary Disease Qty: 24 Each, Refills: 0  
  
amiodarone (CORDARONE) 200 mg tablet Take 1 Tab by mouth daily. Indications: Cardioversion of Atrial Fibrillation 
Qty: 30 Tab, Refills: 0  
  
apixaban (ELIQUIS) 5 mg tablet Take 1 Tab by mouth two (2) times a day. Indications: Cerebral Thromboembolism Prevention 
Qty: 60 Tab, Refills: 0  
  
aspirin delayed-release 81 mg tablet Take 1 Tab by mouth daily. Indications: CAD Qty: 30 Tab, Refills: 0  
  
cholecalciferol (VITAMIN D3) 1,000 unit cap Take 2 Caps by mouth daily. Daughter is unsure about the dosage. Indications: Osteoporosis, Vitamin D Deficiency Qty: 30 Cap, Refills: 0  
  
famotidine (PEPCID) 20 mg tablet Take 1 Tab by mouth nightly. Indications: gastroesophageal reflux disease Qty: 15 Tab, Refills: 0 FLUoxetine (PROZAC) 20 mg tablet Take 2 Tabs by mouth nightly. Indications: ANXIETY WITH DEPRESSION Qty: 30 Tab, Refills: 0  
  
furosemide (LASIX) 20 mg tablet Take 1 Tab by mouth daily as needed. Indications: Edema Qty: 30 Tab, Refills: 0 insulin aspart U-100 100 unit/mL injection SubCUTAneous route Before breakfast, lunch, dinner and at bedtime. For Blood Sugar (mg/dL) of:    
Less than 150 =   0 units 150 -199 =   2 units 200 -249 =   4 units 250 -299 =   6 units 300 -349 =   8 units 350 and above =  10 units  Indications: type 2 diabetes mellitus Qty: 1 Vial, Refills: 0  
  
mometasone (ASMANEX TWISTHALER) 220 mcg (120 doses) aepb inhaler Take 1 Puff by inhalation daily. Powder that patient inhales. 220mcg. Indications: MAINTENANCE THERAPY FOR ASTHMA Qty: 120 Cap, Refills: 0  
  
montelukast (SINGULAIR) 10 mg tablet Take 1 Tab by mouth daily. Indications: MAINTENANCE THERAPY FOR ASTHMA Qty: 30 Tab, Refills: 0  
  
polyethylene glycol (MIRALAX) 17 gram/dose powder Take 17 g by mouth daily as needed. Indications: constipation 
Qty: 510 g, Refills: 0  
  
prednisoLONE acetate (PRED FORTE) 1 % ophthalmic suspension Administer 1 Drop to left eye two (2) times a day. Please verify with  Patient's daughter the percentage. Thanks,   Indications: PREVENTION OF CYTARABINE-INDUCED KERATOCONJUNCTIVITIS Qty: 5 mL, Refills: 0 QUEtiapine (SEROQUEL) 25 mg tablet Take 0.5 Tabs by mouth two (2) times a day. Indications: Generalized Anxiety Disorder 
Qty: 30 Tab, Refills: 0  
  
simvastatin (ZOCOR) 10 mg tablet Take 1 Tab by mouth nightly. Indications: hypertriglyceridemia Qty: 30 Tab, Refills: 0  
  
risperiDONE (RISPERDAL) 1 mg tablet Take 1 Tab by mouth nightly. Qty: 30 Tab, Refills: 0 STOP taking these medications  
  
 glipiZIDE SR (GLUCOTROL XL) 10 mg CR tablet Comments:  
Reason for Stopping:   
   
 losartan (COZAAR) 25 mg tablet Comments:  
Reason for Stopping:   
   
 Menthol-Zinc Oxide (CALMOSEPTINE) 0.44-20.6 % oint Comments:  
Reason for Stopping:   
   
 metFORMIN (GLUCOPHAGE) 1,000 mg tablet Comments:  
Reason for Stopping:   
   
  
 
 
Activity: Activity as tolerated. FALL PRECAUTIONS.   
 
Diet: diabetic diet 1800 kcal daily. mechanical soft solids/thin liquids Meds: as tolerated Aspiration Precautions Oral Care TID Wound Care: None needed Follow-up: to be followed by hospice doctor in community Minutes spent on discharge: greater than 30

## 2018-08-03 NOTE — HOSPICE
Hospice follow up: Telephone call to [facility] to see if the  had completed the needed assessment. Spoke with Rica Valera who stated the  Carmela Baltazar wasn't in the office at the moment. Left a vm for Carmela Baltazar with my contact information.

## 2018-08-03 NOTE — DIABETES MGMT
NUTRITIONAL ASSESSMENT GLYCEMIC CONTROL/ PLAN OF CARE Torin Lewis           66 y.o.           7/26/2018 1. HCAP (healthcare-associated pneumonia) 2. Hypokalemia 3. Hypomagnesemia 4. Congestive heart failure, unspecified HF chronicity, unspecified heart failure type ([address] INTERVENTIONS/PLAN:  
Monitor need to decrease Lantus insulin if blood glucose trends down Continue inpatient monitoring and intervention ASSESSMENT:  
Patient is a 66year old resident of [address] [place] [address] assisted living facility with medical history including diabetes mellitus,  Hypertension, high cholesterol, Atrial fibrillation, chronic on Eliquis, pleural effusions, morbid obesity, COPD, dementia, and Parkinson's disease. Blood glucose fluctuating, noted Lantus insulin increased yesterday. Noted plan for patient to d/c back to [address] [place] [address] with hospice. Diabetes Management:  
Recent blood glucose:    
8/2/2018 07:27 8/2/2018 11:51 8/2/2018 16:33 8/2/2018 21:46 8/3/2018 07:26  
134 (H) 196 (H) 488 (HH) 102 83 Within target range (non-ICU: <140; ICU<180): [] Yes   [x]  No 
 
Current Insulin regimen:  
Lantus insulin 18 units daily before dinner Lispro insulin 4 times daily ACHS (normal insulin sensitivity) Home medication/insulin regimen:  
Glipizide SR Lispro insulin per sliding scale Metformin HbA1c: 8.5% (estimated average glucose of 197 mg/dL) Adequate glycemic control PTA:  [x] Yes  [] No 
  
SUBJECTIVE/OBJECTIVE:  
Diet: Dysphagia mechanically altered, consistent carbohydrate 5450-9835 Kcal, no concentrated sweets Patient Vitals for the past 100 hrs: 
 % Diet Eaten  
07/30/18 1027 15 % Medications: [x] Reviewed Most Recent POC Glucose:  
Recent Labs 08/03/18 
 0320  08/02/18 
 0319  08/01/18 
 3973 GLU  78  158*  156* Labs:  
Lab Results Component Value Date/Time Hemoglobin A1c 8.5 (H) 07/29/2018 04:03 AM  
 
Lab Results Component Value Date/Time Sodium 142 08/03/2018 03:20 AM  
 Potassium 4.5 08/03/2018 03:20 AM  
 Chloride 114 (H) 08/03/2018 03:20 AM  
 CO2 19 (L) 08/03/2018 03:20 AM  
 Anion gap 9 08/03/2018 03:20 AM  
 Glucose 78 08/03/2018 03:20 AM  
 BUN 17 08/03/2018 03:20 AM  
 Creatinine 1.18 08/03/2018 03:20 AM  
 Calcium 8.4 (L) 08/03/2018 03:20 AM  
 Magnesium 2.0 07/28/2018 04:30 AM  
 Phosphorus 3.6 07/27/2018 05:29 AM  
 Albumin 2.6 (L) 08/01/2018 03:55 AM  
 
Anthropometrics: BMI (calculated): 34.3 Wt Readings from Last 1 Encounters:  
08/03/18 93.4 kg (206 lb) Ht Readings from Last 1 Encounters:  
07/26/18 5' 5\" (1.651 m) Estimated Nutrition Needs:  6425-4838 Kcal/day, 75-93 grams protein/day Based on:   [x]Actual BW    [] IBW   []  Adjusted BW   
 
Nutrition Diagnoses: Altered nutrition related lab value related to diabetes as evidenced by Hemoglobin A1c of 8.5% Nutrition Interventions: coordination of care Goal: Blood glucose will be within target range of  mg/dL by 8/05/18 Nutrition Monitoring and Evaluation []     Monitor po intake on meal rounds 
[x]     Continue inpatient monitoring and intervention 
[]     Other: 
 
Irish Cruz RD, CDE pgr [phone]

## 2018-08-03 NOTE — PROGRESS NOTES
Called [facility] scheduled transport for 6:30 pm to [email or web address] at [place] with Anel Arteaga. Informed Carlota of transportation arrangements.

## 2018-08-08 NOTE — ED PROVIDER NOTES
EMERGENCY DEPARTMENT HISTORY AND PHYSICAL EXAM    1:20 PM      Date: 8/8/2018  Patient Name: Damir Garcia    History of Presenting Illness     Chief Complaint   Patient presents with    Low Blood Sugar         History Provided By: Patients family at bedside. Chief Complaint: Hypoglycemic episode  Duration: This morning. Timing:  Acute  Location: Blood  Quality: N/A  Severity: Moderate  Modifying Factors: None  Associated Symptoms: denies any other associated signs or symptoms      Additional History (Context): Damir Garcia is a 66 y.o. female with HTN, DM, A-Fib, Osteoarthritis, Liver disease, Asthma and Tricuspid valve issues who presents to the ED via EMS with c/o acute, moderate hypoglycemic episode onset this morning. Pt family at bedside serving as primary historians. Family notes that the pt is on hospice care secondary to tricuspid valve issues. The family reports that the pt has DM. As the pt has a DNR in place, the family requests only to have her blood glucose regulated. No fever, chills, CP, SOB, abdominal pain, nausea, vomiting, diarrhea and any urinary sx. No other sx or concerns at this time. PCP: Carlee Diaz MD    Current Outpatient Prescriptions   Medication Sig Dispense Refill    metoprolol tartrate (LOPRESSOR) 25 mg tablet Take 0.5 Tabs by mouth every twelve (12) hours. Indications: VENTRICULAR RATE CONTROL IN ATRIAL FIBRILLATION 30 Tab 2    morphine (ROXANOL) 100 mg/5 mL (20 mg/mL) concentrated solution Take 0.5 mL by mouth every two (2) hours as needed for Pain. Max Daily Amount: 120 mg. 15 mL 0    atropine 1 % ophthalmic solution 1 Drop by SubLINGual route every four (4) hours as needed. 5 mL 0    LORazepam (LORAZEPAM INTENSOL) 2 mg/mL concentrated solution Take 0.5 mL by mouth every four (4) hours as needed for Agitation, Anxiety or Other (shortness of breath). Max Daily Amount: 6 mg.  30 mL 0    QUEtiapine (SEROQUEL) 25 mg tablet Take 0.5 Tabs by mouth two (2) times a day. Indications: Generalized Anxiety Disorder 30 Tab 0    risperiDONE (RISPERDAL) 1 mg tablet Take 1 Tab by mouth nightly. 30 Tab 0    acetaminophen (TYLENOL) 325 mg tablet Take 2 Tabs by mouth every six (6) hours as needed for Pain or Fever. Indications: Arthritic Pain, Fever 30 Tab 0    albuterol (PROVENTIL HFA, VENTOLIN HFA, PROAIR HFA) 90 mcg/actuation inhaler Take 1 Puff by inhalation every six (6) hours as needed (Asthma/cough). Indications: Acute Asthma Attack 1 Inhaler 0    albuterol (PROVENTIL VENTOLIN) 2.5 mg /3 mL (0.083 %) nebulizer solution 3 mL by Nebulization route every four (4) hours as needed for Wheezing or Shortness of Breath. Indications: Chronic Obstructive Pulmonary Disease 24 Each 0    amiodarone (CORDARONE) 200 mg tablet Take 1 Tab by mouth daily. Indications: Cardioversion of Atrial Fibrillation 30 Tab 2    aspirin delayed-release 81 mg tablet Take 1 Tab by mouth daily. Indications: CAD 30 Tab 2    cholecalciferol (VITAMIN D3) 1,000 unit cap Take 2 Caps by mouth daily. Daughter is unsure about the dosage. Indications: Osteoporosis, Vitamin D Deficiency 60 Cap 2    famotidine (PEPCID) 20 mg tablet Take 1 Tab by mouth nightly. Indications: gastroesophageal reflux disease 30 Tab 0    FLUoxetine (PROZAC) 20 mg tablet Take 2 Tabs by mouth nightly. Indications: ANXIETY WITH DEPRESSION 60 Tab 0    furosemide (LASIX) 20 mg tablet Take 1 Tab by mouth daily as needed. Indications: Edema 25 Tab 0    insulin lispro (HUMALOG) 100 unit/mL injection SubCUTAneous route Before breakfast, lunch, dinner and at bedtime. For Blood Sugar (mg/dL) of:     Less than 150 =   0 units           150 -199 =   2 units  200 -249 =   4 units  250 -299 =   6 units  300 -349 =   8 units  350 and above =  10 units  Indications: type 2 diabetes mellitus 1 Vial 1    montelukast (SINGULAIR) 10 mg tablet Take 1 Tab by mouth daily.  Indications: MAINTENANCE THERAPY FOR ASTHMA 30 Tab 0    polyethylene glycol (MIRALAX) 17 gram/dose powder Take 17 g by mouth daily as needed. Indications: constipation 238 g 0    prednisoLONE acetate (PRED FORTE) 1 % ophthalmic suspension Administer 1 Drop to left eye two (2) times a day. Indications: PREVENTION OF CYTARABINE-INDUCED KERATOCONJUNCTIVITIS 5 mL 0    simvastatin (ZOCOR) 10 mg tablet Take 1 Tab by mouth nightly. Indications: hypertriglyceridemia 30 Tab 0    insulin aspart U-100 (NOVOLOG) 100 unit/mL injection Normal Insulin Sensitivity   For Blood Sugar (mg/dL) of:     Less than 150 =   0 units           150 -199 =   2 units  200 -249 =   4 units  250 -299 =   6 units  300 -349 =   8 units  350 and above =   10 units 20 mL 0    insulin glargine (LANTUS) 100 unit/mL injection 18 Units by SubCUTAneous route Daily (before dinner). 1 Vial 2    apixaban (ELIQUIS) 5 mg tablet Take 1 Tab by mouth two (2) times a day. Indications: Cerebral Thromboembolism Prevention 60 Tab 0    mometasone (ASMANEX TWISTHALER) 220 mcg (120 doses) aepb inhaler Take 1 Puff by inhalation daily. Powder that patient inhales. 220mcg.   Indications: MAINTENANCE THERAPY FOR ASTHMA 120 Cap 0       Past History     Past Medical History:  Past Medical History:   Diagnosis Date    Anemia NEC     Arthritis     Arthropathy, unspecified, site unspecified     Asthma     Atrial fibrillation (HCC)     CHRONIC A-FIB    Diabetes mellitus     Essential hypertension     Heart abnormalities     irregular heartbeat    Hypertension     Liver disease     steatohepatitis    Neurological disorder     dementia    Osteoarthritis     LEFT HIP       Past Surgical History:  Past Surgical History:   Procedure Laterality Date    Janett Irasema HIP REPLACEMENT  11/2014    HX OTHER SURGICAL  5/27/12    colon mass removed    HX TUBAL LIGATION         Family History:  Family History   Problem Relation Age of Onset    Hypertension Mother     Diabetes Father     Diabetes Daughter        Social History:  Social History   Substance Use Topics    Smoking status: Never Smoker    Smokeless tobacco: Never Used    Alcohol use No       Allergies: Allergies   Allergen Reactions    Compazine [Prochlorperazine Edisylate] Other (comments)     Twitching, seizure-like symptoms         Review of Systems       Review of Systems   Constitutional: Negative for chills and fever. Respiratory: Negative for shortness of breath. Cardiovascular: Negative for chest pain. Gastrointestinal: Negative for abdominal pain, diarrhea, nausea and vomiting. Genitourinary: Negative for dysuria. All other systems reviewed and are negative. Physical Exam     Visit Vitals    /76 (BP 1 Location: Right arm, BP Patient Position: At rest)    Pulse (!) 50    Temp 97.7 °F (36.5 °C)    Resp 23    Ht 5' 5\" (1.651 m)    Wt 104 kg (229 lb 5 oz)    SpO2 99%    BMI 38.16 kg/m2         Physical Exam     Physical Exam:  . Patient Vitals for the past 12 hrs:   Temp Pulse Resp BP SpO2   08/08/18 1255 97.7 °F (36.5 °C) (!) 50 23 154/76 99 %     Gen: Obese 66 y.o. female  HEENT: Normocephalic, atraumatic  Respiratory: No accessory muscle use No wheeze, No rales, No rhonchi. Normal chest wall excursion. No subcutaneous air, no rib crepitus  Cardiovascular: Normal pulses, Normal perfusion. No edema. Gastrointestinal: Non distended, Non tender, No masses. No ascites. No organomegaly. No evidence of trauma  Musculoskeletal: Full range of motion at all other tested joints. No joint effusions. Neuro: Normal strength, Normal sensation. Normal speech. No ataxia. Cranial Nerves II-XII normal as tested. Skin: No rash, petechia or purpura. Warm and dry  Psyche: No suicidal ideation, No homicidal ideation. No hallucinations. Organized thoughts. Heme: Normal  : Deferred    Pt is not answering any questions, history provided by family.          Diagnostic Study Results     Labs -  Recent Results (from the past 12 hour(s))   GLUCOSE, POC    Collection Time: 08/08/18  1:02 PM   Result Value Ref Range    Glucose (POC) 136 (H) 70 - 110 mg/dL       Radiologic Studies -   No orders to display         Medical Decision Making   I am the first provider for this patient. I reviewed the vital signs, available nursing notes, past medical history, past surgical history, family history and social history. Vital Signs-Reviewed the patient's vital signs. Pulse Oximetry Analysis -  3L of O2 via NC (Interpretation)Abnormal    Cardiac Monitor:  Rate: 50  Rhythm:  Sinus Bradycardia      Records Reviewed: Nursing Notes (Time of Review: 1:20 PM)    ED Course: Progress Notes, Reevaluation, and Consults:      Provider Notes (Medical Decision Making): Family request only treatment for blood glucose      Diagnosis     Clinical Impression: The encounter diagnosis was Hypoglycemia. Disposition: Discharged. Follow-up Information     None           Patient's Medications   Start Taking    No medications on file   Continue Taking    ACETAMINOPHEN (TYLENOL) 325 MG TABLET    Take 2 Tabs by mouth every six (6) hours as needed for Pain or Fever. Indications: Arthritic Pain, Fever    ALBUTEROL (PROVENTIL HFA, VENTOLIN HFA, PROAIR HFA) 90 MCG/ACTUATION INHALER    Take 1 Puff by inhalation every six (6) hours as needed (Asthma/cough). Indications: Acute Asthma Attack    ALBUTEROL (PROVENTIL VENTOLIN) 2.5 MG /3 ML (0.083 %) NEBULIZER SOLUTION    3 mL by Nebulization route every four (4) hours as needed for Wheezing or Shortness of Breath. Indications: Chronic Obstructive Pulmonary Disease    AMIODARONE (CORDARONE) 200 MG TABLET    Take 1 Tab by mouth daily. Indications: Cardioversion of Atrial Fibrillation    APIXABAN (ELIQUIS) 5 MG TABLET    Take 1 Tab by mouth two (2) times a day. Indications: Cerebral Thromboembolism Prevention    ASPIRIN DELAYED-RELEASE 81 MG TABLET    Take 1 Tab by mouth daily.  Indications: CAD    ATROPINE 1 % OPHTHALMIC SOLUTION    1 Drop by SubLINGual route every four (4) hours as needed. CHOLECALCIFEROL (VITAMIN D3) 1,000 UNIT CAP    Take 2 Caps by mouth daily. Daughter is unsure about the dosage. Indications: Osteoporosis, Vitamin D Deficiency    FAMOTIDINE (PEPCID) 20 MG TABLET    Take 1 Tab by mouth nightly. Indications: gastroesophageal reflux disease    FLUOXETINE (PROZAC) 20 MG TABLET    Take 2 Tabs by mouth nightly. Indications: ANXIETY WITH DEPRESSION    FUROSEMIDE (LASIX) 20 MG TABLET    Take 1 Tab by mouth daily as needed. Indications: Edema    INSULIN ASPART U-100 (NOVOLOG) 100 UNIT/ML INJECTION    Normal Insulin Sensitivity   For Blood Sugar (mg/dL) of:     Less than 150 =   0 units           150 -199 =   2 units  200 -249 =   4 units  250 -299 =   6 units  300 -349 =   8 units  350 and above =   10 units    INSULIN GLARGINE (LANTUS) 100 UNIT/ML INJECTION    18 Units by SubCUTAneous route Daily (before dinner). INSULIN LISPRO (HUMALOG) 100 UNIT/ML INJECTION    SubCUTAneous route Before breakfast, lunch, dinner and at bedtime. For Blood Sugar (mg/dL) of:     Less than 150 =   0 units           150 -199 =   2 units  200 -249 =   4 units  250 -299 =   6 units  300 -349 =   8 units  350 and above =  10 units  Indications: type 2 diabetes mellitus    LORAZEPAM (LORAZEPAM INTENSOL) 2 MG/ML CONCENTRATED SOLUTION    Take 0.5 mL by mouth every four (4) hours as needed for Agitation, Anxiety or Other (shortness of breath). Max Daily Amount: 6 mg. METOPROLOL TARTRATE (LOPRESSOR) 25 MG TABLET    Take 0.5 Tabs by mouth every twelve (12) hours. Indications: VENTRICULAR RATE CONTROL IN ATRIAL FIBRILLATION    MOMETASONE (ASMANEX TWISTHALER) 220 MCG (120 DOSES) AEPB INHALER    Take 1 Puff by inhalation daily. Powder that patient inhales. 220mcg. Indications: MAINTENANCE THERAPY FOR ASTHMA    MONTELUKAST (SINGULAIR) 10 MG TABLET    Take 1 Tab by mouth daily.  Indications: MAINTENANCE THERAPY FOR ASTHMA    MORPHINE (ROXANOL) 100 MG/5 ML (20 MG/ML) CONCENTRATED SOLUTION    Take 0.5 mL by mouth every two (2) hours as needed for Pain. Max Daily Amount: 120 mg. POLYETHYLENE GLYCOL (MIRALAX) 17 GRAM/DOSE POWDER    Take 17 g by mouth daily as needed. Indications: constipation    PREDNISOLONE ACETATE (PRED FORTE) 1 % OPHTHALMIC SUSPENSION    Administer 1 Drop to left eye two (2) times a day. Indications: PREVENTION OF CYTARABINE-INDUCED KERATOCONJUNCTIVITIS    QUETIAPINE (SEROQUEL) 25 MG TABLET    Take 0.5 Tabs by mouth two (2) times a day. Indications: Generalized Anxiety Disorder    RISPERIDONE (RISPERDAL) 1 MG TABLET    Take 1 Tab by mouth nightly. SIMVASTATIN (ZOCOR) 10 MG TABLET    Take 1 Tab by mouth nightly. Indications: hypertriglyceridemia   These Medications have changed    No medications on file   Stop Taking    No medications on file     _______________________________    Attestations:  Thee Hensonton  acting as a scribe for and in the presence of Haris Leblanc MD      August 08, 2018 at 1:20 PM       Provider Attestation:      I personally performed the services described in the documentation, reviewed the documentation, as recorded by the scribe in my presence, and it accurately and completely records my words and actions.  August 08, 2018 at 1:20 PM - Tete Olsen MD    _______________________________

## 2018-08-08 NOTE — DISCHARGE INSTRUCTIONS
Hypoglycemia: Care Instructions  Your Care Instructions    Hypoglycemia means that your blood sugar is low and your body is not getting enough fuel. Some people get low blood sugar from not eating often enough. Some medicines to treat diabetes can cause low blood sugar. People who have had surgery on their stomachs or intestines may get hypoglycemia. Problems with the pancreas, kidneys, or liver also can cause low blood sugar. A snack or drink with sugar in it will raise your blood sugar and should ease your symptoms right away. Your doctor may recommend that you change or stop your medicines until you can get your blood sugar levels under control. In the long run, you may need to change your diet and eating habits so that you get enough fuel for your body throughout the day. Follow-up care is a key part of your treatment and safety. Be sure to make and go to all appointments, and call your doctor if you are having problems. It's also a good idea to know your test results and keep a list of the medicines you take. How can you care for yourself at home? · Learn to recognize the early signs of low blood sugar. Signs include:  ¨ Nausea. ¨ Hunger. ¨ Feeling nervous, irritable, or shaky. ¨ Cold, clammy, wet skin. ¨ Sweating (when you are not exercising). ¨ A fast heartbeat. ¨ Numbness or tingling of the fingertips or lips. · If you feel an episode of low blood sugar coming on, drink fruit juice or sugared (not diet) soda, or eat sugar in the form of candy, cubes, or tablets. [employer] are another [employer]. · Eat small, frequent meals so that you do not get too hungry between meals. · Balance extra exercise with eating more. · Keep a written record of your low blood sugar episodes, including when you last ate and what you ate, so that you can learn what causes your blood sugar to drop.   · Make sure your family, friends, and coworkers know the symptoms of low blood sugar and know what to do to get your sugar level up. · Wear medical alert jewelry that lists your condition. You can buy this at most drugstores. When should you call for help? Call 911 anytime you think you may need emergency care. For example, call if:    · You passed out (lost consciousness).     · You are confused or cannot think clearly.     · Your blood sugar is very high or very low.    Watch closely for changes in your health, and be sure to contact your doctor if:    · Your blood sugar stays outside the level your doctor set for you.     · You have any problems. Where can you learn more? Go to http://minda-valentino.info/. Enter W210 in the search box to learn more about \"Hypoglycemia: Care Instructions. \"  Current as of: December 7, 2017  Content Version: 11.7  © 6041-6964 [employer]. Care instructions adapted under license by [employer] (which disclaims liability or warranty for this information). If you have questions about a medical condition or this instruction, always ask your healthcare professional. [address] any warranty or liability for your use of this information.

## 2018-08-08 NOTE — ED TRIAGE NOTES
Arrived via EMS from [facility] for hypoglycemia. Hospice patient for tricuspid valve issues.   DNR in place

## 2018-08-08 NOTE — ED NOTES
Medical transport requested.   Complete [place] care rendered and patient prepared for transport back to [facility]

## 2018-08-08 NOTE — ED NOTES
Reviewed discharge with EMS.   Patient departed the ed for return to [facility]

## 2018-09-14 LAB
ACID FAST STN SPEC: NEGATIVE
MYCOBACTERIUM SPEC QL CULT: NEGATIVE
SPECIMEN PREPARATION: NORMAL
SPECIMEN SOURCE: NORMAL